# Patient Record
Sex: MALE | Race: WHITE | NOT HISPANIC OR LATINO | Employment: FULL TIME | URBAN - NONMETROPOLITAN AREA
[De-identification: names, ages, dates, MRNs, and addresses within clinical notes are randomized per-mention and may not be internally consistent; named-entity substitution may affect disease eponyms.]

---

## 2018-08-14 ENCOUNTER — OFFICE VISIT (OUTPATIENT)
Dept: INTERNAL MEDICINE | Facility: CLINIC | Age: 53
End: 2018-08-14

## 2018-08-14 VITALS
WEIGHT: 215 LBS | TEMPERATURE: 98.1 F | BODY MASS INDEX: 30.1 KG/M2 | HEART RATE: 90 BPM | SYSTOLIC BLOOD PRESSURE: 140 MMHG | OXYGEN SATURATION: 96 % | HEIGHT: 71 IN | DIASTOLIC BLOOD PRESSURE: 80 MMHG

## 2018-08-14 DIAGNOSIS — E66.9 CLASS 1 OBESITY WITHOUT SERIOUS COMORBIDITY WITH BODY MASS INDEX (BMI) OF 30.0 TO 30.9 IN ADULT, UNSPECIFIED OBESITY TYPE: Primary | ICD-10-CM

## 2018-08-14 DIAGNOSIS — Z00.00 ANNUAL PHYSICAL EXAM: ICD-10-CM

## 2018-08-14 DIAGNOSIS — K21.9 GASTROESOPHAGEAL REFLUX DISEASE WITHOUT ESOPHAGITIS: ICD-10-CM

## 2018-08-14 DIAGNOSIS — M19.90 ARTHRITIS: ICD-10-CM

## 2018-08-14 DIAGNOSIS — Z72.0 TOBACCO ABUSE: ICD-10-CM

## 2018-08-14 DIAGNOSIS — R05.9 COUGH: ICD-10-CM

## 2018-08-14 PROBLEM — E66.811 CLASS 1 OBESITY WITHOUT SERIOUS COMORBIDITY WITH BODY MASS INDEX (BMI) OF 30.0 TO 30.9 IN ADULT: Status: ACTIVE | Noted: 2018-08-14

## 2018-08-14 PROCEDURE — 99386 PREV VISIT NEW AGE 40-64: CPT | Performed by: INTERNAL MEDICINE

## 2018-08-14 NOTE — PROGRESS NOTES
Mariano Newberry is a 52 y.o. male and is here for a comprehensive physical exam.     Do you take any herbs or supplements that were not prescribed by a doctor? no  Are you taking calcium supplements? no  Are you taking aspirin daily? no      The following portions of the patient's history were reviewed and updated as appropriate: allergies, current medications, past family history, past medical history, past social history, past surgical history and problem list.      Review of Systems   Constitutional: Negative.    HENT: Negative.    Eyes: Negative.    Respiratory: Negative.    Cardiovascular: Negative.    Gastrointestinal: Negative.    Endocrine: Negative.    Genitourinary: Negative.    Musculoskeletal: Negative.    Skin: Negative.    Allergic/Immunologic: Negative.    Neurological: Negative.    Hematological: Negative.    Psychiatric/Behavioral: Negative.    All other systems reviewed and are negative.        Physical Exam   Constitutional: He is oriented to person, place, and time. He appears well-developed and well-nourished.   HENT:   Head: Normocephalic and atraumatic.   Right Ear: External ear normal.   Left Ear: External ear normal.   Nose: Nose normal.   Mouth/Throat: Oropharynx is clear and moist.   Eyes: Pupils are equal, round, and reactive to light. Conjunctivae and EOM are normal.   Neck: Normal range of motion. Neck supple. No thyromegaly present.   Cardiovascular: Normal rate, regular rhythm, normal heart sounds and intact distal pulses.    Pulmonary/Chest: Effort normal and breath sounds normal.   Abdominal: Soft. Bowel sounds are normal.   Genitourinary: Rectum normal, prostate normal and penis normal.   Musculoskeletal: Normal range of motion.   Neurological: He is alert and oriented to person, place, and time. He has normal reflexes.   Skin: Skin is warm and dry.   Psychiatric: He has a normal mood and affect. His behavior is normal. Judgment and thought content normal.   Nursing  note and vitals reviewed.      All  tests have been reviewed.    Assessment/Plan          1. Patient Counseling:  --Nutrition: Stressed importance of moderation in sodium/caffeine intake, saturated fat and cholesterol, caloric balance, sufficient intake of fresh fruits, vegetables, fiber, calcium and iron.  --Exercise: Stressed the importance of regular exercise.   --Injury prevention: Discussed safety belts, safety helmets, smoke detector, smoking near bedding or upholstery.   --Dental health: Discussed importance of regular tooth brushing, flossing, and dental visits.  --Immunizations reviewed.  --Discussed benefits of screening colonoscopy.  --After hours service discussed with patient    2. Discussed the patient's BMI with him.           cough chronic do CXR  GERD improved after good diet patient is not taking medication  Right thumb pain due to repetitive work.  Watch for now   Family history of diabetes  Possible kidney cancer in the family.    Patient uses tobacco.    Patient also has chronic a cough do CXR  loss of hearing  Over weight  Diet   Colon ca screen  Borderline, BP, watch for now  Do labs    1 mo after labs  Easy bruise mild.

## 2018-08-15 LAB
ALBUMIN SERPL-MCNC: 4.3 G/DL (ref 3.5–5)
ALBUMIN/GLOB SERPL: 1.5 G/DL (ref 1–2)
ALP SERPL-CCNC: 78 U/L (ref 38–126)
ALT SERPL-CCNC: 33 U/L (ref 13–69)
AST SERPL-CCNC: 30 U/L (ref 15–46)
BASOPHILS # BLD AUTO: 0.03 10*3/MM3 (ref 0–0.2)
BASOPHILS NFR BLD AUTO: 0.5 % (ref 0–2.5)
BILIRUB SERPL-MCNC: 0.7 MG/DL (ref 0.2–1.3)
BUN SERPL-MCNC: 18 MG/DL (ref 7–20)
BUN/CREAT SERPL: 16.4 (ref 6.3–21.9)
CALCIUM SERPL-MCNC: 10 MG/DL (ref 8.4–10.2)
CHLORIDE SERPL-SCNC: 104 MMOL/L (ref 98–107)
CHOLEST SERPL-MCNC: 190 MG/DL (ref 0–199)
CO2 SERPL-SCNC: 26 MMOL/L (ref 26–30)
CREAT SERPL-MCNC: 1.1 MG/DL (ref 0.6–1.3)
EOSINOPHIL # BLD AUTO: 0.05 10*3/MM3 (ref 0–0.7)
EOSINOPHIL NFR BLD AUTO: 0.8 % (ref 0–7)
ERYTHROCYTE [DISTWIDTH] IN BLOOD BY AUTOMATED COUNT: 13.8 % (ref 11.5–14.5)
GLOBULIN SER CALC-MCNC: 2.9 GM/DL
GLUCOSE SERPL-MCNC: 96 MG/DL (ref 74–98)
HCT VFR BLD AUTO: 45.7 % (ref 42–52)
HDLC SERPL-MCNC: 53 MG/DL (ref 40–60)
HGB BLD-MCNC: 15.4 G/DL (ref 14–18)
IMM GRANULOCYTES # BLD: 0.01 10*3/MM3 (ref 0–0.06)
IMM GRANULOCYTES NFR BLD: 0.2 % (ref 0–0.6)
LDLC SERPL CALC-MCNC: 116 MG/DL (ref 0–99)
LYMPHOCYTES # BLD AUTO: 1.52 10*3/MM3 (ref 0.6–3.4)
LYMPHOCYTES NFR BLD AUTO: 24.2 % (ref 10–50)
MCH RBC QN AUTO: 29.4 PG (ref 27–31)
MCHC RBC AUTO-ENTMCNC: 33.7 G/DL (ref 30–37)
MCV RBC AUTO: 87.2 FL (ref 80–94)
MONOCYTES # BLD AUTO: 0.61 10*3/MM3 (ref 0–0.9)
MONOCYTES NFR BLD AUTO: 9.7 % (ref 0–12)
NEUTROPHILS # BLD AUTO: 4.05 10*3/MM3 (ref 2–6.9)
NEUTROPHILS NFR BLD AUTO: 64.6 % (ref 37–80)
NRBC BLD AUTO-RTO: 0 /100 WBC (ref 0–0)
PLATELET # BLD AUTO: 303 10*3/MM3 (ref 130–400)
POTASSIUM SERPL-SCNC: 4.3 MMOL/L (ref 3.5–5.1)
PROT SERPL-MCNC: 7.2 G/DL (ref 6.3–8.2)
PSA SERPL-MCNC: 0.7 NG/ML (ref 0.06–4)
RBC # BLD AUTO: 5.24 10*6/MM3 (ref 4.7–6.1)
SODIUM SERPL-SCNC: 142 MMOL/L (ref 137–145)
TRIGL SERPL-MCNC: 105 MG/DL
TSH SERPL DL<=0.005 MIU/L-ACNC: 1.75 MIU/ML (ref 0.47–4.68)
VLDLC SERPL CALC-MCNC: 21 MG/DL
WBC # BLD AUTO: 6.27 10*3/MM3 (ref 4.8–10.8)

## 2018-08-22 LAB
APPEARANCE UR: CLEAR
BILIRUB UR QL STRIP: NEGATIVE
COLOR UR: YELLOW
GLUCOSE UR QL: NEGATIVE
HEMOCCULT STL QL IA: NEGATIVE
HGB UR QL STRIP: NEGATIVE
KETONES UR QL STRIP: NEGATIVE
LEUKOCYTE ESTERASE UR QL STRIP: NEGATIVE
NITRITE UR QL STRIP: NEGATIVE
PH UR STRIP: 6 [PH] (ref 5–8)
PROT UR QL STRIP: NEGATIVE
SP GR UR: 1.01 (ref 1–1.03)
UROBILINOGEN UR STRIP-MCNC: NORMAL MG/DL

## 2018-09-14 ENCOUNTER — HOSPITAL ENCOUNTER (OUTPATIENT)
Dept: GENERAL RADIOLOGY | Facility: HOSPITAL | Age: 53
Discharge: HOME | End: 2018-09-14
Attending: INTERNAL MEDICINE | Admitting: INTERNAL MEDICINE

## 2018-09-14 ENCOUNTER — OFFICE VISIT (OUTPATIENT)
Dept: INTERNAL MEDICINE | Facility: CLINIC | Age: 53
End: 2018-09-14

## 2018-09-14 VITALS
HEIGHT: 71 IN | OXYGEN SATURATION: 98 % | SYSTOLIC BLOOD PRESSURE: 148 MMHG | DIASTOLIC BLOOD PRESSURE: 84 MMHG | HEART RATE: 84 BPM

## 2018-09-14 DIAGNOSIS — K21.9 GASTROESOPHAGEAL REFLUX DISEASE WITHOUT ESOPHAGITIS: ICD-10-CM

## 2018-09-14 DIAGNOSIS — E66.9 CLASS 1 OBESITY WITHOUT SERIOUS COMORBIDITY WITH BODY MASS INDEX (BMI) OF 30.0 TO 30.9 IN ADULT, UNSPECIFIED OBESITY TYPE: ICD-10-CM

## 2018-09-14 DIAGNOSIS — R05.9 COUGH: ICD-10-CM

## 2018-09-14 DIAGNOSIS — R05.9 COUGH: Primary | ICD-10-CM

## 2018-09-14 DIAGNOSIS — Z72.0 TOBACCO ABUSE: ICD-10-CM

## 2018-09-14 DIAGNOSIS — M19.90 ARTHRITIS: ICD-10-CM

## 2018-09-14 PROBLEM — Z00.00 ANNUAL PHYSICAL EXAM: Status: RESOLVED | Noted: 2018-08-14 | Resolved: 2018-09-14

## 2018-09-14 PROCEDURE — 99213 OFFICE O/P EST LOW 20 MIN: CPT | Performed by: INTERNAL MEDICINE

## 2018-09-14 PROCEDURE — 71046 X-RAY EXAM CHEST 2 VIEWS: CPT

## 2018-09-14 NOTE — PROGRESS NOTES
Subjective   Tobias Newberry is a 53 y.o. male.     Chief Complaint   Patient presents with   • Follow-up       History of Present Illness   Patient here for follow-up of.  The GERD stable blood pressure slightly elevated.    Cholesterol mildly elevated.  Patient still smokes weight is high.  Pending colonoscopy  No current outpatient prescriptions on file.    The following portions of the patient's history were reviewed and updated as appropriate: allergies, current medications, past family history, past medical history, past social history, past surgical history and problem list.    Review of Systems   Constitutional: Negative.    Respiratory: Negative.    Cardiovascular: Negative.    Gastrointestinal: Negative.    Skin: Negative.    Psychiatric/Behavioral: Negative.        Objective   Physical Exam   Constitutional: He is oriented to person, place, and time. He appears well-developed and well-nourished.   Neck: Neck supple.   Cardiovascular: Normal rate, regular rhythm and normal heart sounds.    Pulmonary/Chest: Effort normal and breath sounds normal.   Abdominal: Soft. Bowel sounds are normal.   Neurological: He is alert and oriented to person, place, and time.   Psychiatric: He has a normal mood and affect. His behavior is normal.       All tests have been reviewed.    Assessment/Plan   There are no diagnoses linked to this encounter.            GERD improved after good diet patient is not taking medication  Right thumb pain due to repetitive work.  Watch for now   Family history of diabetes  Possible kidney cancer in the family.    Patient uses tobacco.    Patient also has chronic a cough do CXR  loss of hearing  Over weight  Diet   Colon ca screen scope pending  Borderline, BP, watch for now, need to loose weight and low salt  Hyperlipidemia, diet for now  Easy bruise mild.  6 mo

## 2021-08-05 ENCOUNTER — TELEPHONE (OUTPATIENT)
Dept: INTERNAL MEDICINE | Facility: CLINIC | Age: 56
End: 2021-08-05

## 2021-08-05 NOTE — TELEPHONE ENCOUNTER
Caller: Tobias Newberry    Relationship: Self    Best call back number: [phone]    What orders are you requesting (i.e. lab or imaging): TEST FOR COVID ANTIBIOTIES    In what timeframe would the patient need to come in: ASAP    Where will you receive your lab/imaging services: [facility]    Additional notes:

## 2021-08-05 NOTE — TELEPHONE ENCOUNTER
Pt informed via vm he is overdue for an annual physical, labs can be ordered at that visit and the testing done for antibodies, but we cannot order antibody testing, that it is done downstairs ordered by Dr. Caicedo and that we cannot even give him results of the antibody test, he would have to sign up with [facility] for those results

## 2022-01-10 ENCOUNTER — OFFICE VISIT (OUTPATIENT)
Dept: INTERNAL MEDICINE | Facility: CLINIC | Age: 57
End: 2022-01-10

## 2022-01-10 VITALS
HEART RATE: 88 BPM | SYSTOLIC BLOOD PRESSURE: 160 MMHG | OXYGEN SATURATION: 98 % | DIASTOLIC BLOOD PRESSURE: 90 MMHG | BODY MASS INDEX: 30.66 KG/M2 | WEIGHT: 219 LBS | TEMPERATURE: 98 F | HEIGHT: 71 IN

## 2022-01-10 DIAGNOSIS — R31.9 HEMATURIA, UNSPECIFIED TYPE: ICD-10-CM

## 2022-01-10 DIAGNOSIS — N30.01 ACUTE CYSTITIS WITH HEMATURIA: Primary | ICD-10-CM

## 2022-01-10 LAB
BILIRUB BLD-MCNC: NEGATIVE MG/DL
CLARITY, POC: CLEAR
COLOR UR: YELLOW
EXPIRATION DATE: ABNORMAL
GLUCOSE UR STRIP-MCNC: NEGATIVE MG/DL
KETONES UR QL: NEGATIVE
LEUKOCYTE EST, POC: NEGATIVE
Lab: ABNORMAL
NITRITE UR-MCNC: NEGATIVE MG/ML
PH UR: 6 [PH] (ref 5–8)
PROT UR STRIP-MCNC: ABNORMAL MG/DL
RBC # UR STRIP: ABNORMAL /UL
SP GR UR: 1.02 (ref 1–1.03)
UROBILINOGEN UR QL: NORMAL

## 2022-01-10 PROCEDURE — 81003 URINALYSIS AUTO W/O SCOPE: CPT | Performed by: FAMILY MEDICINE

## 2022-01-10 PROCEDURE — 99203 OFFICE O/P NEW LOW 30 MIN: CPT | Performed by: FAMILY MEDICINE

## 2022-01-10 RX ORDER — CIPROFLOXACIN 500 MG/1
500 TABLET, FILM COATED ORAL 2 TIMES DAILY
Qty: 14 TABLET | Refills: 0 | OUTPATIENT
Start: 2022-01-10 | End: 2022-01-16

## 2022-01-10 NOTE — PROGRESS NOTES
"Tobias Newberry is a 56 y.o. male.    Chief Complaint   Patient presents with   • Urinary Tract Infection     blood in urine       HPI   Patient reports urinary problems for 3 day(s).  They admit to bloody urine.  He has passes a blood clot yesterday. They deny urgency, frequency and lower abdominal pain.  He started cipro 2 days ago.  Denies any hematuria since yesterday morning.      The following portions of the patient's history were reviewed and updated as appropriate: allergies, current medications, past family history, past medical history, past social history, past surgical history and problem list.     No Known Allergies      Current Outpatient Medications:   •  ciprofloxacin (CIPRO) 500 MG tablet, Take 1 tablet by mouth 2 (Two) Times a Day for 7 days., Disp: 14 tablet, Rfl: 0    ROS    Review of Systems   Constitutional: Negative for chills and fever.   Respiratory: Negative for cough and shortness of breath.    Gastrointestinal: Negative for abdominal pain.   Genitourinary: Positive for hematuria. Negative for frequency.       Vitals:    01/10/22 1634   BP: 160/90   Pulse: 88   Temp: 98 °F (36.7 °C)   SpO2: 98%   Weight: 99.3 kg (219 lb)   Height: 180.3 cm (71\")     Body mass index is 30.54 kg/m².    Physical Exam     Physical Exam  Constitutional:       General: He is not in acute distress.     Appearance: Normal appearance. He is well-developed.   HENT:      Head: Normocephalic and atraumatic.      Right Ear: External ear normal.      Left Ear: External ear normal.   Eyes:      Extraocular Movements: Extraocular movements intact.      Conjunctiva/sclera: Conjunctivae normal.   Cardiovascular:      Rate and Rhythm: Normal rate and regular rhythm.      Heart sounds: No murmur heard.      Pulmonary:      Effort: Pulmonary effort is normal. No respiratory distress.      Breath sounds: Normal breath sounds. No wheezing.   Abdominal:      General: Bowel sounds are normal. There is no distension.      " Palpations: Abdomen is soft.      Tenderness: There is no abdominal tenderness. There is no right CVA tenderness or left CVA tenderness.   Skin:     General: Skin is warm and dry.   Neurological:      Mental Status: He is alert and oriented to person, place, and time.      Cranial Nerves: No cranial nerve deficit.   Psychiatric:         Mood and Affect: Mood normal.         Behavior: Behavior normal.         Assessment/Plan    Problems Addressed this Visit     None      Visit Diagnoses     Acute cystitis with hematuria    -  Primary    Relevant Medications    ciprofloxacin (CIPRO) 500 MG tablet    Other Relevant Orders    POCT urinalysis dipstick, automated (Completed)    Hematuria, unspecified type        Relevant Orders    XR Abdomen KUB        Patient advised to complete full 14 days of cipro.  Will obtain KUB to rule out stone.  Patient will need to follow up with PCP.      New Medications Ordered This Visit   Medications   • ciprofloxacin (CIPRO) 500 MG tablet     Sig: Take 1 tablet by mouth 2 (Two) Times a Day for 7 days.     Dispense:  14 tablet     Refill:  0       No orders of the defined types were placed in this encounter.      Return in about 1 week for hematuria with Dr. Rachael Contreras DO

## 2022-01-11 ENCOUNTER — HOSPITAL ENCOUNTER (OUTPATIENT)
Dept: GENERAL RADIOLOGY | Facility: HOSPITAL | Age: 57
Discharge: HOME | End: 2022-01-11
Admitting: FAMILY MEDICINE

## 2022-01-11 PROCEDURE — 74018 RADEX ABDOMEN 1 VIEW: CPT

## 2022-01-16 ENCOUNTER — APPOINTMENT (OUTPATIENT)
Dept: CT IMAGING | Facility: HOSPITAL | Age: 57
End: 2022-01-16

## 2022-01-16 ENCOUNTER — HOSPITAL ENCOUNTER (EMERGENCY)
Facility: HOSPITAL | Age: 57
Discharge: HOME | End: 2022-01-16
Attending: EMERGENCY MEDICINE | Admitting: FAMILY MEDICINE

## 2022-01-16 VITALS
HEIGHT: 71 IN | OXYGEN SATURATION: 100 % | HEART RATE: 86 BPM | DIASTOLIC BLOOD PRESSURE: 87 MMHG | BODY MASS INDEX: 31.69 KG/M2 | WEIGHT: 226.4 LBS | TEMPERATURE: 98.6 F | RESPIRATION RATE: 18 BRPM | SYSTOLIC BLOOD PRESSURE: 138 MMHG

## 2022-01-16 DIAGNOSIS — N30.01 ACUTE CYSTITIS WITH HEMATURIA: Primary | ICD-10-CM

## 2022-01-16 DIAGNOSIS — C64.2 RENAL CELL CARCINOMA OF LEFT KIDNEY: ICD-10-CM

## 2022-01-16 LAB
ALBUMIN SERPL-MCNC: 4.3 G/DL (ref 3.5–5.2)
ALBUMIN/GLOB SERPL: 1.4 G/DL
ALP SERPL-CCNC: 79 U/L (ref 39–117)
ALT SERPL W P-5'-P-CCNC: 18 U/L (ref 1–41)
ANION GAP SERPL CALCULATED.3IONS-SCNC: 13.8 MMOL/L (ref 5–15)
AST SERPL-CCNC: 18 U/L (ref 1–40)
BACTERIA UR QL AUTO: ABNORMAL /HPF
BASOPHILS # BLD AUTO: 0.06 10*3/MM3 (ref 0–0.2)
BASOPHILS NFR BLD AUTO: 0.7 % (ref 0–1.5)
BILIRUB SERPL-MCNC: 0.3 MG/DL (ref 0–1.2)
BILIRUB UR QL STRIP: ABNORMAL
BUN SERPL-MCNC: 13 MG/DL (ref 6–20)
BUN/CREAT SERPL: 11.5 (ref 7–25)
CALCIUM SPEC-SCNC: 9.4 MG/DL (ref 8.6–10.5)
CHLORIDE SERPL-SCNC: 96 MMOL/L (ref 98–107)
CLARITY UR: ABNORMAL
CO2 SERPL-SCNC: 23.2 MMOL/L (ref 22–29)
COLOR UR: ABNORMAL
CREAT SERPL-MCNC: 1.13 MG/DL (ref 0.76–1.27)
DEPRECATED RDW RBC AUTO: 42 FL (ref 37–54)
EOSINOPHIL # BLD AUTO: 0.17 10*3/MM3 (ref 0–0.4)
EOSINOPHIL NFR BLD AUTO: 2.1 % (ref 0.3–6.2)
ERYTHROCYTE [DISTWIDTH] IN BLOOD BY AUTOMATED COUNT: 13.2 % (ref 12.3–15.4)
GFR SERPL CREATININE-BSD FRML MDRD: 67 ML/MIN/1.73
GLOBULIN UR ELPH-MCNC: 3 GM/DL
GLUCOSE SERPL-MCNC: 101 MG/DL (ref 65–99)
GLUCOSE UR STRIP-MCNC: NEGATIVE MG/DL
HCT VFR BLD AUTO: 39.4 % (ref 37.5–51)
HGB BLD-MCNC: 13.8 G/DL (ref 13–17.7)
HGB UR QL STRIP.AUTO: ABNORMAL
HYALINE CASTS UR QL AUTO: ABNORMAL /LPF
IMM GRANULOCYTES # BLD AUTO: 0.04 10*3/MM3 (ref 0–0.05)
IMM GRANULOCYTES NFR BLD AUTO: 0.5 % (ref 0–0.5)
KETONES UR QL STRIP: NEGATIVE
LEUKOCYTE ESTERASE UR QL STRIP.AUTO: ABNORMAL
LYMPHOCYTES # BLD AUTO: 2.31 10*3/MM3 (ref 0.7–3.1)
LYMPHOCYTES NFR BLD AUTO: 28.5 % (ref 19.6–45.3)
MCH RBC QN AUTO: 30.5 PG (ref 26.6–33)
MCHC RBC AUTO-ENTMCNC: 35 G/DL (ref 31.5–35.7)
MCV RBC AUTO: 87 FL (ref 79–97)
MONOCYTES # BLD AUTO: 0.72 10*3/MM3 (ref 0.1–0.9)
MONOCYTES NFR BLD AUTO: 8.9 % (ref 5–12)
NEUTROPHILS NFR BLD AUTO: 4.8 10*3/MM3 (ref 1.7–7)
NEUTROPHILS NFR BLD AUTO: 59.3 % (ref 42.7–76)
NITRITE UR QL STRIP: POSITIVE
NRBC BLD AUTO-RTO: 0 /100 WBC (ref 0–0.2)
PH UR STRIP.AUTO: <=5 [PH] (ref 5–8)
PLATELET # BLD AUTO: 261 10*3/MM3 (ref 140–450)
PMV BLD AUTO: 8.8 FL (ref 6–12)
POTASSIUM SERPL-SCNC: 3.9 MMOL/L (ref 3.5–5.2)
PROT SERPL-MCNC: 7.3 G/DL (ref 6–8.5)
PROT UR QL STRIP: ABNORMAL
RBC # BLD AUTO: 4.53 10*6/MM3 (ref 4.14–5.8)
RBC # UR STRIP: ABNORMAL /HPF
REF LAB TEST METHOD: ABNORMAL
SODIUM SERPL-SCNC: 133 MMOL/L (ref 136–145)
SP GR UR STRIP: 1.01 (ref 1–1.03)
SQUAMOUS #/AREA URNS HPF: ABNORMAL /HPF
UROBILINOGEN UR QL STRIP: ABNORMAL
WBC # UR STRIP: ABNORMAL /HPF
WBC NRBC COR # BLD: 8.1 10*3/MM3 (ref 3.4–10.8)

## 2022-01-16 PROCEDURE — 81001 URINALYSIS AUTO W/SCOPE: CPT | Performed by: PHYSICIAN ASSISTANT

## 2022-01-16 PROCEDURE — 74178 CT ABD&PLV WO CNTR FLWD CNTR: CPT

## 2022-01-16 PROCEDURE — 99283 EMERGENCY DEPT VISIT LOW MDM: CPT

## 2022-01-16 PROCEDURE — 85025 COMPLETE CBC W/AUTO DIFF WBC: CPT | Performed by: PHYSICIAN ASSISTANT

## 2022-01-16 PROCEDURE — 74176 CT ABD & PELVIS W/O CONTRAST: CPT

## 2022-01-16 PROCEDURE — 51702 INSERT TEMP BLADDER CATH: CPT

## 2022-01-16 PROCEDURE — 80053 COMPREHEN METABOLIC PANEL: CPT | Performed by: PHYSICIAN ASSISTANT

## 2022-01-16 PROCEDURE — 51798 US URINE CAPACITY MEASURE: CPT

## 2022-01-16 PROCEDURE — 25010000002 IOPAMIDOL 61 % SOLUTION: Performed by: FAMILY MEDICINE

## 2022-01-16 RX ORDER — SODIUM CHLORIDE 0.9 % (FLUSH) 0.9 %
10 SYRINGE (ML) INJECTION AS NEEDED
Status: DISCONTINUED | OUTPATIENT
Start: 2022-01-16 | End: 2022-01-17 | Stop reason: HOSPADM

## 2022-01-16 RX ORDER — CEFDINIR 300 MG/1
300 CAPSULE ORAL 2 TIMES DAILY
Qty: 14 CAPSULE | Refills: 0 | Status: SHIPPED | OUTPATIENT
Start: 2022-01-16 | End: 2022-01-23

## 2022-01-16 RX ADMIN — IOPAMIDOL 100 ML: 612 INJECTION, SOLUTION INTRAVENOUS at 21:15

## 2022-01-16 NOTE — ED PROVIDER NOTES
Subjective   Chief Complaint: Hematuria, difficulty urinating  History of Present Illness: 56-year-old male comes in for evaluation above complaint.  For 1 week he has had intermittent hematuria.  He started passing clots and for the past 2 hours has been unable to urinate although he feels like he needs to.  No dysuria.  He has seen urgent care and his PCP and had a KUB and a urinalysis done and has been on over a week of Cipro with progression of symptoms. Blood in urine was present Sunday morning with clots, has been clear until yesterday had hematuria with clots again. Had nitrite positive urine on 1/8 and on 1/10 nitrite had resolved.  He stopped smoking cigarettes about 5 years ago.  No back or flank pain.  No history of kidney stones  Onset: 1 week ago  Timing: Ongoing  Exacerbating / Alleviating factors: None  Associated symptoms: None      Nurses Notes reviewed and agree, including vitals, allergies, social history and prior medical history.          Review of Systems   Constitutional: Negative.    HENT: Negative.    Eyes: Negative.    Respiratory: Negative.    Cardiovascular: Negative.    Gastrointestinal: Negative.  Negative for abdominal pain.   Genitourinary: Positive for difficulty urinating and hematuria. Negative for dysuria and flank pain.   Musculoskeletal: Negative.  Negative for back pain.   Skin: Negative.    Allergic/Immunologic: Negative.    Neurological: Negative.    Psychiatric/Behavioral: Negative.    All other systems reviewed and are negative.      History reviewed. No pertinent past medical history.    No Known Allergies    History reviewed. No pertinent surgical history.    Family History   Problem Relation Age of Onset   • Cancer Mother    • Heart disease Father    • Cancer Brother    • Leukemia Maternal Aunt    • Heart disease Paternal Uncle    • Skin cancer Maternal Grandfather        Social History     Socioeconomic History   • Marital status:    Tobacco Use   • Smoking  status: Former Smoker     Packs/day: 1.50     Years: 4.00     Pack years: 6.00     Quit date: 1/10/2016     Years since quittin.0   • Smokeless tobacco: Current User     Types: Chew   Vaping Use   • Vaping Use: Every day   • Substances: Flavoring   Substance and Sexual Activity   • Alcohol use: Yes     Comment: SOCIAL - WEEKENDS    • Drug use: No   • Sexual activity: Yes     Partners: Female           Objective   Physical Exam  Vitals and nursing note reviewed.   Constitutional:       General: He is not in acute distress.     Appearance: Normal appearance. He is normal weight. He is not ill-appearing, toxic-appearing or diaphoretic.   HENT:      Head: Normocephalic and atraumatic.   Eyes:      Extraocular Movements: Extraocular movements intact.   Cardiovascular:      Rate and Rhythm: Normal rate.   Pulmonary:      Effort: Pulmonary effort is normal.   Abdominal:      General: Abdomen is flat.      Palpations: Abdomen is soft.      Tenderness: There is no abdominal tenderness. There is no guarding or rebound.   Musculoskeletal:         General: Normal range of motion.      Cervical back: Normal range of motion.   Skin:     General: Skin is warm and dry.   Neurological:      General: No focal deficit present.      Mental Status: He is alert.   Psychiatric:         Mood and Affect: Mood normal.         Behavior: Behavior normal.         Procedures           ED Course  ED Course as of 22 0811   Sun  Hemoglobin: 13.8 [TM]    Hematocrit: 39.4 [TM]    WBC, UA(!): 6-12 [TM]   1919 Bacteria, UA(!): 2+ [TM]    Nitrite, UA(!): Positive [TM]   191 BUN: 13 [TM]   191 Creatinine: 1.13 [TM]      ED Course User Index  [TM] Mandeep Hastings PA-C                                                 [facility]  191  Patient has had 1500 cc of grossly bloody urine out feels much better.  Final diagnoses:   Acute cystitis with hematuria   Renal cell carcinoma of left kidney (HCC)       ED  Disposition  ED Disposition     ED Disposition Condition Comment    Discharge Stable           Nabeel Bullock MD  [address] [facility]  [address]  [facility] [id number] [phone]    On 1/18/2022      Manjinder Obrien MD  [address] [facility] [address]  [facility] [address]  [phone]    Schedule an appointment as soon as possible for a visit in 1 day  new diagnosis of renal cell carcinoma; hematuria         Medication List      New Prescriptions    cefdinir 300 MG capsule  Commonly known as: OMNICEF  Take 1 capsule by mouth 2 (Two) Times a Day for 7 days.        Stop    ciprofloxacin 500 MG tablet  Commonly known as: CIPRO           Where to Get Your Medications      These medications were sent to [id number] - [place], [place] - [address] - [phone]  - [phone] FX  [address] [facility] [address]    Phone: [phone]   · cefdinir 300 MG capsule          Mandeep Hastings PA-C  01/17/22 0811

## 2022-01-17 NOTE — DISCHARGE INSTRUCTIONS
Please stop the Cipro that you were started on and begin the Omnicef that we have called into the pharmacy for you.

## 2022-01-18 ENCOUNTER — TELEPHONE (OUTPATIENT)
Dept: UROLOGY | Facility: CLINIC | Age: 57
End: 2022-01-18

## 2022-01-18 ENCOUNTER — OFFICE VISIT (OUTPATIENT)
Dept: INTERNAL MEDICINE | Facility: CLINIC | Age: 57
End: 2022-01-18

## 2022-01-18 VITALS
SYSTOLIC BLOOD PRESSURE: 160 MMHG | HEART RATE: 105 BPM | WEIGHT: 215 LBS | BODY MASS INDEX: 30.1 KG/M2 | OXYGEN SATURATION: 99 % | HEIGHT: 71 IN | TEMPERATURE: 97.3 F | DIASTOLIC BLOOD PRESSURE: 102 MMHG

## 2022-01-18 DIAGNOSIS — C64.2 CARCINOMA OF LEFT KIDNEY: ICD-10-CM

## 2022-01-18 DIAGNOSIS — I10 PRIMARY HYPERTENSION: Primary | ICD-10-CM

## 2022-01-18 DIAGNOSIS — R31.9 HEMATURIA, UNSPECIFIED TYPE: ICD-10-CM

## 2022-01-18 PROBLEM — E66.811 CLASS 1 OBESITY WITHOUT SERIOUS COMORBIDITY WITH BODY MASS INDEX (BMI) OF 30.0 TO 30.9 IN ADULT: Status: RESOLVED | Noted: 2018-08-14 | Resolved: 2022-01-18

## 2022-01-18 PROBLEM — E66.9 CLASS 1 OBESITY WITHOUT SERIOUS COMORBIDITY WITH BODY MASS INDEX (BMI) OF 30.0 TO 30.9 IN ADULT: Status: RESOLVED | Noted: 2018-08-14 | Resolved: 2022-01-18

## 2022-01-18 PROCEDURE — 99214 OFFICE O/P EST MOD 30 MIN: CPT | Performed by: INTERNAL MEDICINE

## 2022-01-18 RX ORDER — AMLODIPINE BESYLATE 2.5 MG/1
2.5 TABLET ORAL DAILY
Qty: 30 TABLET | Refills: 1 | Status: SHIPPED | OUTPATIENT
Start: 2022-01-18 | End: 2022-03-21

## 2022-01-18 NOTE — TELEPHONE ENCOUNTER
Caller: Tobias Newberry    Relationship: Self    Best call back number: [phone]    What was the call regarding: PATIENT WAS REFERRED FROM THE ER FOR Acute cystitis with hematuria & Renal cell carcinoma of left kidney (HCC). CALLING TO SCHEDULE AN APPT, REFERRAL WAS SENT TO SCHEDULE REVIEW.     UNABLE TO WARM TRANSFER     Do you require a callback: YES

## 2022-01-18 NOTE — PROGRESS NOTES
Subjective   Tobias Newberry is a 56 y.o. male.     Chief Complaint   Patient presents with   • Hospital Follow Up Visit   • Blood in Urine     recent diagnosis of renal cell carcinoma; currently using catheter   • Hypertension       History of Present Illness   Patient here for follow-up hospital visit.  Patient went to emergency room due to hematuria CT scan showed renal cell carcinoma.  Patient has Ross catheter in place now.  Patient also has a high blood pressure now.  Denies any fever chills denies any back pain    Current Outpatient Medications:   •  cefdinir (OMNICEF) 300 MG capsule, Take 1 capsule by mouth 2 (Two) Times a Day for 7 days., Disp: 14 capsule, Rfl: 0  •  amLODIPine (NORVASC) 2.5 MG tablet, Take 1 tablet by mouth Daily., Disp: 30 tablet, Rfl: 1    The following portions of the patient's history were reviewed and updated as appropriate: allergies, current medications, past family history, past medical history, past social history, past surgical history and problem list.    Review of Systems   Constitutional: Negative.    Respiratory: Negative.    Cardiovascular: Negative.    Gastrointestinal: Negative.    Genitourinary: Positive for hematuria.   Musculoskeletal: Negative.    Skin: Negative.    Neurological: Negative.    Psychiatric/Behavioral: Negative.        Objective   Physical Exam  Genitourinary:     Comments: Ross catheter in place  Musculoskeletal:         General: No tenderness.         All tests have been reviewed.    Assessment/Plan   Diagnoses and all orders for this visit:    Primary hypertension start med  -     amLODIPine (NORVASC) 2.5 MG tablet; Take 1 tablet by mouth Daily.  -     Ambulatory Referral to Urology    Carcinoma of left kidney (HCC) stat referral  -     Ambulatory Referral to Urology    Hematuria, unspecified type  -     Ambulatory Referral to Urology

## 2022-01-20 ENCOUNTER — HOSPITAL ENCOUNTER (OUTPATIENT)
Dept: CT IMAGING | Facility: HOSPITAL | Age: 57
Discharge: HOME | End: 2022-01-20
Admitting: UROLOGY

## 2022-01-20 DIAGNOSIS — N28.89 RENAL MASS: ICD-10-CM

## 2022-01-20 PROCEDURE — 25010000002 IOPAMIDOL 61 % SOLUTION: Performed by: UROLOGY

## 2022-01-20 PROCEDURE — 71270 CT THORAX DX C-/C+: CPT

## 2022-01-20 RX ADMIN — IOPAMIDOL 100 ML: 612 INJECTION, SOLUTION INTRAVENOUS at 12:14

## 2022-01-24 ENCOUNTER — OFFICE VISIT (OUTPATIENT)
Dept: UROLOGY | Facility: CLINIC | Age: 57
End: 2022-01-24

## 2022-01-24 DIAGNOSIS — R91.1 LUNG NODULE: ICD-10-CM

## 2022-01-24 DIAGNOSIS — N28.89 RENAL MASS: Primary | ICD-10-CM

## 2022-01-24 PROCEDURE — 52000 CYSTOURETHROSCOPY: CPT | Performed by: UROLOGY

## 2022-01-24 RX ORDER — SODIUM CHLORIDE 9 MG/ML
125 INJECTION, SOLUTION INTRAVENOUS CONTINUOUS
Status: CANCELLED | OUTPATIENT
Start: 2022-01-24

## 2022-01-24 NOTE — PROGRESS NOTES
No chief complaint on file.     HPI  Ms. Newberry is a 56 y.o. male with history below in assessment, who presents for follow up.     At this visit he denies any gross hematuria.     No past medical history on file.    No past surgical history on file.      Current Outpatient Medications:   •  amLODIPine (NORVASC) 2.5 MG tablet, Take 1 tablet by mouth Daily., Disp: 30 tablet, Rfl: 1     Physical Exam  There were no vitals taken for this visit.    Labs  Brief Urine Lab Results  (Last result in the past 365 days)      Color   Clarity   Blood   Leuk Est   Nitrite   Protein   CREAT   Urine HCG        01/16/22 1903 Red   Cloudy   Large (3+)   Moderate (2+)   Positive   >=300 mg/dL (3+)                 Lab Results   Component Value Date    GLUCOSE 101 (H) 01/16/2022    CALCIUM 9.4 01/16/2022     (L) 01/16/2022    K 3.9 01/16/2022    CO2 23.2 01/16/2022    CL 96 (L) 01/16/2022    BUN 13 01/16/2022    CREATININE 1.13 01/16/2022    EGFRIFAFRI 85 08/14/2018    EGFRIFNONA 67 01/16/2022    BCR 11.5 01/16/2022    ANIONGAP 13.8 01/16/2022       Lab Results   Component Value Date    WBC 8.10 01/16/2022    HGB 13.8 01/16/2022    HCT 39.4 01/16/2022    MCV 87.0 01/16/2022     01/16/2022       Urine Culture    Urine Culture 1/8/22   Urine Culture Final report              Lab Results   Component Value Date    PSA 0.705 08/14/2018         Radiographic Studies  CT Abdomen Pelvis With & Without Contrast  Result Date: 1/16/2022  Locally advanced left renal cell carcinoma with possible left adrenal metastasis. Authenticated by Luís Montes De Oca M.D. on 01/16/2022 10:31:22 PM    CT Abdomen Pelvis Without Contrast  Result Date: 1/16/2022  Left renal mass worrisome for carcinoma with possible tumor thrombus in the left renal vein.  CT of the abdomen with and without contrast is recommended for further evaluation. Increased left ureteral dilation is likely due to obstructive effects of the blood clot in the urinary bladder. Authenticated  by Luís Montes De Oca M.D. on 01/16/2022 09:13:09 PM    CT Chest With & Without Contrast Diagnostic  Result Date: 1/20/2022  10 mm nonspecific nodule in the right upper lobe. Short interval follow-up is recommended to ensure stability.   This study was performed with techniques to keep radiation doses as low as reasonably achievable (ALARA). Individualized dose reduction techniques using automated exposure control or adjustment of mA and/or kV according to the patient size were employed.  This report was finalized on 1/20/2022 12:22 PM by Bob Parker M.D..    XR Abdomen KUB  Result Date: 1/12/2022   Unremarkable KUB.  This report was finalized on 1/12/2022 12:45 PM by Wero Drake MD.      Preprocedure diagnosis  Gross hematuria    Postprocedure diagnosis  same    Procedure  Flexible Cystourethroscopy    Attending surgeon  Manjinder Obrien MD    Anesthesia  2% lidocaine jelly intraurethrally    Complications  None    Indications  56 y.o. male undergoing a flexible cystoscopy for the above mentioned indications.  Informed consent was obtained.      Findings  Cystoscopic findings included one right and left ureteral orifice in the normal anatomic position with normal bladder mucosa and no tumors, masses or stones. The urethral urothelium was within normal limits with no strictures.  There was not a prominent median lobe.  The lateral lobes were not obstructive in appearance.      Procedure  The patient was placed in supine position and prepped and draped in sterile fashion with lidocaine jelly per urethra for anesthesia.  A timeout was performed.  The 14F flexible cystoscope was lubricated and gently placed through the penile urethra and into the bladder.  The bladder was completely visualized.  The cystoscope was retroflexed and the bladder neck and prostate visualized.  The cystoscope was slowly withdrawn while visualizing the urethra and the procedure terminated.  The patient tolerated the procedure well.          I have reviewed above labs and imaging.     Assessment  56 y.o. 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 All new diagnoses of uncertain prognosis.      The gross hematuria was likely secondary to urinary tract infection.  His urine is now clear today.  The catheter was left out after the cystoscopy.  In a separate room and separate encounter after the cystoscopy, we discussed the risks, benefits, and alternatives the below major surgery.  The main risk that we discussed were bleeding, infection, damage to nearby structures such as the bowel, spleen, pancreas, with need for resection of any potential adjacent structures, even death.  We discussed the right upper lobe lung nodule, which I suspect is unrelated, but will get Dr. Neff's opinion.  We discussed the likelihood that the mass is renal cell cancer, and the usual management of renal cell cancer.  Pathology will dictate any adjuvant therapy that is needed, however most patients do not receive adjuvant chemotherapy, immunotherapy, or radiation, unless there are high risk features, metastasis, or positive margins.     Plan  1. Schedule Left open radical nephrectomy w/ adrenalectomy 2/16/22. Major urgent surgery.   2. Pulmonology referral for RUL nodule

## 2022-01-31 ENCOUNTER — OFFICE VISIT (OUTPATIENT)
Dept: PULMONOLOGY | Facility: CLINIC | Age: 57
End: 2022-01-31

## 2022-01-31 ENCOUNTER — HOSPITAL ENCOUNTER (EMERGENCY)
Facility: HOSPITAL | Age: 57
Discharge: HOME | End: 2022-01-31
Attending: EMERGENCY MEDICINE | Admitting: EMERGENCY MEDICINE

## 2022-01-31 VITALS
WEIGHT: 216 LBS | SYSTOLIC BLOOD PRESSURE: 150 MMHG | DIASTOLIC BLOOD PRESSURE: 90 MMHG | RESPIRATION RATE: 18 BRPM | BODY MASS INDEX: 30.24 KG/M2 | HEIGHT: 71 IN | HEART RATE: 92 BPM | OXYGEN SATURATION: 98 %

## 2022-01-31 VITALS
RESPIRATION RATE: 16 BRPM | HEIGHT: 71 IN | HEART RATE: 88 BPM | DIASTOLIC BLOOD PRESSURE: 87 MMHG | BODY MASS INDEX: 30.24 KG/M2 | SYSTOLIC BLOOD PRESSURE: 132 MMHG | TEMPERATURE: 97.9 F | OXYGEN SATURATION: 100 % | WEIGHT: 216 LBS

## 2022-01-31 DIAGNOSIS — R93.89 ABNORMAL CT OF THE CHEST: Primary | ICD-10-CM

## 2022-01-31 DIAGNOSIS — R31.9 HEMATURIA, UNSPECIFIED TYPE: ICD-10-CM

## 2022-01-31 DIAGNOSIS — R91.1 LUNG NODULE, SOLITARY: ICD-10-CM

## 2022-01-31 DIAGNOSIS — R33.9 URINARY RETENTION: Primary | ICD-10-CM

## 2022-01-31 PROCEDURE — 99282 EMERGENCY DEPT VISIT SF MDM: CPT

## 2022-01-31 PROCEDURE — 99204 OFFICE O/P NEW MOD 45 MIN: CPT | Performed by: INTERNAL MEDICINE

## 2022-01-31 PROCEDURE — 51702 INSERT TEMP BLADDER CATH: CPT

## 2022-01-31 PROCEDURE — 51798 US URINE CAPACITY MEASURE: CPT

## 2022-01-31 NOTE — PROGRESS NOTES
"  CONSULT NOTE    Requested by:   Nabeel Bullock MD      Chief Complaint   Patient presents with   • Consult   • Breathing Problem       Subjective:  Tobias Newberry is a 56 y.o. male.     History of Present Illness   Patient comes in today for consultation because of abnormal CT.    Patient underwent a CT abdomen due to symptoms of hematuria. He was found to have left renal mass. A work up including CT chest was done that showed 10 millimeter lung nodule for which a pulmonology consultation was requested.    The patient describes no family members with a history of lung cancer.      Patient reports smoking 1/2-1 packs per day for the past 3 years.  Quit 6 years ago.    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     The following portions of the patient's history were reviewed and updated as appropriate: allergies, current medications, past family history, past medical history, past social history and past surgical history.    Review of Systems   HENT: Negative for sinus pressure.    Respiratory: Positive for cough.    All other systems reviewed and are negative.      History reviewed. No pertinent past medical history.    Social History     Tobacco Use   • Smoking status: Former Smoker     Packs/day: 1.50     Years: 4.00     Pack years: 6.00     Quit date: 1/10/2016     Years since quittin.0   • Smokeless tobacco: Current User     Types: Chew   Substance Use Topics   • Alcohol use: Yes     Comment: SOCIAL - WEEKENDS          Objective:  Visit Vitals  /90   Pulse 92   Resp 18   Ht 180.3 cm (71\")   Wt 98 kg (216 lb)   SpO2 98%   BMI 30.13 kg/m²       Physical Exam  Vitals reviewed.   Constitutional:       Appearance: He is well-developed.   HENT:      Head:      Comments: No acute lesions noted     Mouth/Throat:      Mouth: Mucous membranes are moist.   Neck:      Thyroid: No thyromegaly.      Vascular: No JVD.   Cardiovascular:      Rate and Rhythm: Normal rate and regular rhythm.      Heart sounds: No " murmur heard.      Pulmonary:      Effort: Pulmonary effort is normal. No respiratory distress.      Breath sounds: No wheezing or rales.   Musculoskeletal:      Cervical back: Neck supple.      Comments: Gait was normal.   Skin:     General: Skin is warm and dry.   Neurological:      Mental Status: He is alert and oriented to person, place, and time.   Psychiatric:         Behavior: Behavior normal.           Assessment/Plan:  Diagnoses and all orders for this visit:    1. Abnormal CT of the chest (Primary)  -     CT Chest Without Contrast Diagnostic; Future    2. Lung nodule, solitary  -     CT Chest Without Contrast Diagnostic; Future        Return in about 3 months (around 4/20/2022) for Recheck, Imaging, For Desiree Mosquera ([place]).    DISCUSSION(if any):  Last CT scan was reviewed in great detail with the patient. Images reviewed personally.   Results for orders placed during the hospital encounter of 01/20/22    CT Chest With & Without Contrast Diagnostic    Narrative  PROCEDURE: CT CHEST W WO CONTRAST DIAGNOSTIC-    HISTORY: eval for pulm mets; N28.89-Other specified disorders of kidney  and ureter    COMPARISON: CT of the abdomen and pelvis dated 01/16/2022.    PROCEDURE: Multiple axial CT images were obtained from the thoracic  inlet through the upper abdomen following the administration of  intravenous contrast.    FINDINGS:  Soft tissue windows demonstrate no adenopathy within the chest. The  heart is normal in size. The aorta is normal in caliber.There is no  pericardial or pleural effusion. Lung windows demonstrate a noncalcified  nodule in the anterior right upper lobe measuring 10 mm. No other  noncalcified nodules are identified.  Within the visualized upper  abdomen there is a partially imaged left renal mass. A cyst is noted in  the left lobe of the liver. Bone windows reveal no lytic or destructive  lesions.    Impression  10 mm nonspecific nodule in the right upper lobe. Short  interval  follow-up is recommended to ensure stability.      This study was performed with techniques to keep radiation doses as low  as reasonably achievable (ALARA). Individualized dose reduction  techniques using automated exposure control or adjustment of mA and/or  kV according to the patient size were employed.    This report was finalized on 1/20/2022 12:22 PM by Bob Parker M.D..    Unlikely that the right sided nodule in the lung represents metastatic disease.    Although it is extremely difficult to say with any certainty if this nodule represents metastatic involvement, it appears unlikely given the fact that this nodule is not in the lower lobes and appears to be solitary.  Given the propensity for renal cell carcinoma to have hematogenous spread, it is more likely to cause multiple lung nodules and usually prefers lower lobes.    No mediastinal lymphadenopathy was also identified, that once again goes somewhat against metastatic involvement.    Nonetheless, this nodule will need to be followed very closely in the first step would be to obtain a repeat CT chest in 3 months.    If the nodule grows any further, then definite work-up will be considered including possibly a PET scan followed by biopsy.    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    Given the urgency to proceed with left nephrectomy, I would suggest that the patient undergoes surgical resection of the primary tumor, as even in stage IV renal cell cancer, it is recommended that surgery be performed as primary treatment option.    Metastasectomy remains an option for these patients as well.    As dictated above, repeat CT will be obtained in 3 months from the initial CT of the chest and further recommendation made afterwards.    I have asked him to follow-up with Dr. Obrien very  closely.    The patient was asked to call this office if he develops any weight loss, hemoptysis, night sweats etc.        Dictated utilizing Dragon dictation.    This document was electronically signed by Lillian Neff MD on 01/31/22 at 13:32 EST

## 2022-02-01 NOTE — ED NOTES
Pt presents to ED via PV with complaints of urinary retention since 1930 this date. Pt has hx of kidney cancer with similar episodes in past. Pt denies pain. Pt is aaox4, PWD, REU. DIANE.      Temi Aguilera, RN  01/31/22 2210

## 2022-02-01 NOTE — ED NOTES
Pt catheter bag switched to leg bag. Pt had total volume of 1100 cc of red urine. Pt tolerated well. Pt voices understanding of d/c and follow up instructions. Pt is pwd, aaox4, reu and nadn noted at time of d/c.      Temi Aguilera, RN  01/31/22 2736

## 2022-02-01 NOTE — ED PROVIDER NOTES
Subjective   56-year-old male presents to the ED with a chief complaint of acute urinary retention.  Patient has known kidney cancer and is scheduled for nephrectomy in the next month.  He states that last week he had to come to the ED for acute urinary retention related to hematuria likely related to his kidney cancer.  He had a catheter removed about a week ago and has not had any issues with urinary retention or difficulty in urinating until tonight.  He states that around 730 he felt the need to urinate passed a small amount of blood and was unable to urinate any further.  He has suprapubic/bladder fullness and pain.  No fever chills.  No chest pain or shortness of breath.  No other complaints at this time.          Review of Systems   Genitourinary: Positive for decreased urine volume, difficulty urinating and hematuria.   All other systems reviewed and are negative.      History reviewed. No pertinent past medical history.    No Known Allergies    Past Surgical History:   Procedure Laterality Date   • VASECTOMY         Family History   Problem Relation Age of Onset   • Cancer Mother    • Heart disease Father    • Cancer Brother    • Leukemia Maternal Aunt    • Heart disease Paternal Uncle    • Skin cancer Maternal Grandfather        Social History     Socioeconomic History   • Marital status:    Tobacco Use   • Smoking status: Former Smoker     Packs/day: 1.50     Years: 4.00     Pack years: 6.00     Quit date: 1/10/2016     Years since quittin.0   • Smokeless tobacco: Current User     Types: Chew   Vaping Use   • Vaping Use: Every day   • Substances: Flavoring   Substance and Sexual Activity   • Alcohol use: Yes     Comment: SOCIAL - WEEKENDS    • Drug use: No   • Sexual activity: Yes     Partners: Female           Objective   Physical Exam  Vitals and nursing note reviewed.   Constitutional:       General: He is not in acute distress.     Appearance: He is well-developed. He is not diaphoretic.    HENT:      Head: Normocephalic and atraumatic.      Nose: Nose normal.   Eyes:      Conjunctiva/sclera: Conjunctivae normal.      Pupils: Pupils are equal, round, and reactive to light.   Cardiovascular:      Rate and Rhythm: Normal rate and regular rhythm.   Pulmonary:      Effort: Pulmonary effort is normal. No respiratory distress.      Breath sounds: Normal breath sounds.   Abdominal:      General: There is no distension.      Palpations: Abdomen is soft.      Tenderness: There is no abdominal tenderness.      Comments: Suprapubic tenderness to palpation   Musculoskeletal:         General: No deformity.   Neurological:      Mental Status: He is alert and oriented to person, place, and time.      Cranial Nerves: No cranial nerve deficit.      Coordination: Coordination normal.         Procedures           ED Course                                                 MDM    56-year-old male presents to the ED with acute urinary retention.  Patient with a known history of bladder cancer.  Bladder scan showed greater than 1 L in the bladder.  Ross catheter was placed with immediate output of blood-tinged clearish urine.  Patient symptoms improved dramatically.  Offered CT for evaluation of possible clot in the bladder but patient states that he had no issue with the last catheter that he had in place and did not clot off at any point and prefers not to have the CT at this time.  Just wishes to be discharged and will follow up with his urologist in a few days.    Final diagnoses:   Urinary retention   Hematuria, unspecified type       ED Disposition  ED Disposition     ED Disposition Condition Comment    Discharge Stable           Nabeel Bullock MD  [address]  [address]  [facility] [address] [phone]          Manjinder Obrien MD  [address] [facility]  [address]  [facility] [id number] [phone]    Schedule an appointment as soon as possible for a visit in 3 days           Medication List      No changes were  made to your prescriptions during this visit.          Adolph Delgado, DO  01/31/22 4495

## 2022-02-02 ENCOUNTER — PRE-ADMISSION TESTING (OUTPATIENT)
Dept: PREADMISSION TESTING | Facility: HOSPITAL | Age: 57
End: 2022-02-02

## 2022-02-02 VITALS — WEIGHT: 215 LBS | HEIGHT: 71 IN | BODY MASS INDEX: 30.1 KG/M2

## 2022-02-02 DIAGNOSIS — N28.89 RENAL MASS: ICD-10-CM

## 2022-02-02 LAB
ABO GROUP BLD: NORMAL
RH BLD: POSITIVE

## 2022-02-02 PROCEDURE — 86900 BLOOD TYPING SEROLOGIC ABO: CPT

## 2022-02-02 PROCEDURE — 85027 COMPLETE CBC AUTOMATED: CPT | Performed by: UROLOGY

## 2022-02-02 PROCEDURE — 86901 BLOOD TYPING SEROLOGIC RH(D): CPT

## 2022-02-02 PROCEDURE — 93005 ELECTROCARDIOGRAM TRACING: CPT

## 2022-02-02 PROCEDURE — 80048 BASIC METABOLIC PNL TOTAL CA: CPT | Performed by: UROLOGY

## 2022-02-05 LAB
QT INTERVAL: 358 MS
QTC INTERVAL: 418 MS

## 2022-02-09 ENCOUNTER — PATIENT ROUNDING (BHMG ONLY) (OUTPATIENT)
Dept: PULMONOLOGY | Facility: CLINIC | Age: 57
End: 2022-02-09

## 2022-02-10 ENCOUNTER — OFFICE VISIT (OUTPATIENT)
Dept: UROLOGY | Facility: CLINIC | Age: 57
End: 2022-02-10

## 2022-02-10 VITALS
TEMPERATURE: 97.7 F | HEIGHT: 71 IN | WEIGHT: 215 LBS | SYSTOLIC BLOOD PRESSURE: 152 MMHG | HEART RATE: 96 BPM | BODY MASS INDEX: 30.1 KG/M2 | OXYGEN SATURATION: 99 % | DIASTOLIC BLOOD PRESSURE: 86 MMHG

## 2022-02-10 DIAGNOSIS — R33.9 URINARY RETENTION: Primary | ICD-10-CM

## 2022-02-10 PROCEDURE — 99214 OFFICE O/P EST MOD 30 MIN: CPT | Performed by: UROLOGY

## 2022-02-10 RX ORDER — TAMSULOSIN HYDROCHLORIDE 0.4 MG/1
1 CAPSULE ORAL NIGHTLY
Qty: 30 CAPSULE | Refills: 11 | Status: ON HOLD | OUTPATIENT
Start: 2022-02-10 | End: 2022-02-16

## 2022-02-10 RX ORDER — SULFAMETHOXAZOLE AND TRIMETHOPRIM 800; 160 MG/1; MG/1
1 TABLET ORAL DAILY
Qty: 10 TABLET | Refills: 0 | Status: ON HOLD | OUTPATIENT
Start: 2022-02-10 | End: 2022-02-16

## 2022-02-10 NOTE — PROGRESS NOTES
"Chief Complaint   Patient presents with   • Follow-up     pt in office for follow up, jeong in        HPI  Ms. Newberry is a 56 y.o. male with history below in assessment, who presents for follow up.     At this visit patient is here to get indwelling Jeong catheter out prior to his scheduled surgery next week.    Past Medical History:   Diagnosis Date   • Anxiety    • Cancer (HCC)     renal cell carcinoma   • Depression     recent due to diagnosis   • Jeong catheter in place 01/31/2022    Placed in ED   • Hematuria    • Hypertension    • PONV (postoperative nausea and vomiting)    • Tattoo    • Tinnitus    • Wears glasses        Past Surgical History:   Procedure Laterality Date   • VASECTOMY           Current Outpatient Medications:   •  amLODIPine (NORVASC) 2.5 MG tablet, Take 1 tablet by mouth Daily., Disp: 30 tablet, Rfl: 1     Physical Exam  Visit Vitals  /86   Pulse 96   Temp 97.7 °F (36.5 °C)   Ht 180.3 cm (71\")   Wt 97.5 kg (215 lb)   SpO2 99%   BMI 29.99 kg/m²       Labs  Brief Urine Lab Results  (Last result in the past 365 days)      Color   Clarity   Blood   Leuk Est   Nitrite   Protein   CREAT   Urine HCG        01/16/22 1903 Red   Cloudy   Large (3+)   Moderate (2+)   Positive   >=300 mg/dL (3+)                 Lab Results   Component Value Date    GLUCOSE 118 (H) 02/02/2022    CALCIUM 9.7 02/02/2022     (L) 02/02/2022    K 4.2 02/02/2022    CO2 24.3 02/02/2022    CL 96 (L) 02/02/2022    BUN 17 02/02/2022    CREATININE 1.28 (H) 02/02/2022    EGFRIFAFRI 85 08/14/2018    EGFRIFNONA 58 (L) 02/02/2022    BCR 13.3 02/02/2022    ANIONGAP 13.7 02/02/2022       Lab Results   Component Value Date    WBC 7.09 02/02/2022    HGB 14.5 02/02/2022    HCT 42.0 02/02/2022    MCV 87.1 02/02/2022     02/02/2022       Urine Culture    Urine Culture 1/8/22   Urine Culture Final report              Lab Results   Component Value Date    PSA 0.705 08/14/2018             Radiographic Studies  CT Abdomen " Pelvis With & Without Contrast    Result Date: 1/16/2022  Locally advanced left renal cell carcinoma with possible left adrenal metastasis. Authenticated by Luís Montes De Oca M.D. on 01/16/2022 10:31:22 PM    CT Abdomen Pelvis Without Contrast    Result Date: 1/16/2022  Left renal mass worrisome for carcinoma with possible tumor thrombus in the left renal vein.  CT of the abdomen with and without contrast is recommended for further evaluation. Increased left ureteral dilation is likely due to obstructive effects of the blood clot in the urinary bladder. Authenticated by Luís Montes De Oca M.D. on 01/16/2022 09:13:09 PM    CT Chest With & Without Contrast Diagnostic    Result Date: 1/20/2022  10 mm nonspecific nodule in the right upper lobe. Short interval follow-up is recommended to ensure stability.   This study was performed with techniques to keep radiation doses as low as reasonably achievable (ALARA). Individualized dose reduction techniques using automated exposure control or adjustment of mA and/or kV according to the patient size were employed.  This report was finalized on 1/20/2022 12:22 PM by Bob Parker M.D..    XR Abdomen KUB    Result Date: 1/12/2022   Unremarkable KUB.  This report was finalized on 1/12/2022 12:45 PM by Wero Drake MD.          I have reviewed above labs and imaging.     Assessment  56 y.o. 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  These are of unclear etiology, as we are not sure if it is bleeding from his large renal mass versus BPH, with blood in the urine after he strains to pee.    Plan  1.  Fill and void today.  He passed this with a postvoid residual of 0 mL.  2.  Start Flomax  3.  Prophylactic antibiotics until surgery, as he has had an indwelling catheter in the past.

## 2022-02-14 ENCOUNTER — LAB (OUTPATIENT)
Dept: LAB | Facility: HOSPITAL | Age: 57
End: 2022-02-14

## 2022-02-14 DIAGNOSIS — N28.89 RENAL MASS: ICD-10-CM

## 2022-02-14 LAB — SARS-COV-2 RNA PNL SPEC NAA+PROBE: NOT DETECTED

## 2022-02-14 PROCEDURE — U0004 COV-19 TEST NON-CDC HGH THRU: HCPCS

## 2022-02-14 PROCEDURE — C9803 HOPD COVID-19 SPEC COLLECT: HCPCS

## 2022-02-15 ENCOUNTER — LAB (OUTPATIENT)
Dept: LAB | Facility: HOSPITAL | Age: 57
End: 2022-02-15

## 2022-02-15 LAB
ABO GROUP BLD: NORMAL
BLD GP AB SCN SERPL QL: NEGATIVE
RH BLD: POSITIVE
T&S EXPIRATION DATE: NORMAL

## 2022-02-15 PROCEDURE — 86920 COMPATIBILITY TEST SPIN: CPT

## 2022-02-15 PROCEDURE — 86900 BLOOD TYPING SEROLOGIC ABO: CPT

## 2022-02-15 PROCEDURE — 86850 RBC ANTIBODY SCREEN: CPT

## 2022-02-15 PROCEDURE — 86901 BLOOD TYPING SEROLOGIC RH(D): CPT

## 2022-02-15 PROCEDURE — 36415 COLL VENOUS BLD VENIPUNCTURE: CPT

## 2022-02-16 ENCOUNTER — HOSPITAL ENCOUNTER (INPATIENT)
Facility: HOSPITAL | Age: 57
LOS: 2 days | Discharge: HOME | End: 2022-02-18
Attending: UROLOGY | Admitting: UROLOGY

## 2022-02-16 ENCOUNTER — ANESTHESIA EVENT (OUTPATIENT)
Dept: PERIOP | Facility: HOSPITAL | Age: 57
End: 2022-02-16

## 2022-02-16 ENCOUNTER — ANESTHESIA (OUTPATIENT)
Dept: PERIOP | Facility: HOSPITAL | Age: 57
End: 2022-02-16

## 2022-02-16 ENCOUNTER — ANESTHESIA EVENT CONVERTED (OUTPATIENT)
Dept: ANESTHESIOLOGY | Facility: HOSPITAL | Age: 57
End: 2022-02-16

## 2022-02-16 DIAGNOSIS — N28.89 RENAL MASS: ICD-10-CM

## 2022-02-16 LAB
ANION GAP SERPL CALCULATED.3IONS-SCNC: 10.4 MMOL/L (ref 5–15)
BUN SERPL-MCNC: 18 MG/DL (ref 6–20)
BUN/CREAT SERPL: 12.9 (ref 7–25)
CALCIUM SPEC-SCNC: 9.3 MG/DL (ref 8.6–10.5)
CHLORIDE SERPL-SCNC: 104 MMOL/L (ref 98–107)
CO2 SERPL-SCNC: 24.6 MMOL/L (ref 22–29)
CREAT SERPL-MCNC: 1.39 MG/DL (ref 0.76–1.27)
GFR SERPL CREATININE-BSD FRML MDRD: 53 ML/MIN/1.73
GLUCOSE SERPL-MCNC: 128 MG/DL (ref 65–99)
POTASSIUM SERPL-SCNC: 3.8 MMOL/L (ref 3.5–5.2)
SODIUM SERPL-SCNC: 139 MMOL/L (ref 136–145)

## 2022-02-16 PROCEDURE — 86900 BLOOD TYPING SEROLOGIC ABO: CPT

## 2022-02-16 PROCEDURE — 0TT10ZZ RESECTION OF LEFT KIDNEY, OPEN APPROACH: ICD-10-PCS | Performed by: UROLOGY

## 2022-02-16 PROCEDURE — 0 CEFAZOLIN SODIUM-DEXTROSE 2-3 GM-%(50ML) RECONSTITUTED SOLUTION: Performed by: UROLOGY

## 2022-02-16 PROCEDURE — 80048 BASIC METABOLIC PNL TOTAL CA: CPT | Performed by: UROLOGY

## 2022-02-16 PROCEDURE — C1889 IMPLANT/INSERT DEVICE, NOC: HCPCS | Performed by: UROLOGY

## 2022-02-16 PROCEDURE — 25010000002 DEXAMETHASONE PER 1 MG: Performed by: NURSE ANESTHETIST, CERTIFIED REGISTERED

## 2022-02-16 PROCEDURE — 60540 EXPLORE ADRENAL GLAND: CPT | Performed by: UROLOGY

## 2022-02-16 PROCEDURE — 25010000002 HYDROMORPHONE 1 MG/ML SOLUTION: Performed by: NURSE ANESTHETIST, CERTIFIED REGISTERED

## 2022-02-16 PROCEDURE — 50230 REMOVAL KIDNEY OPEN RADICAL: CPT | Performed by: UROLOGY

## 2022-02-16 PROCEDURE — 0GT20ZZ RESECTION OF LEFT ADRENAL GLAND, OPEN APPROACH: ICD-10-PCS | Performed by: UROLOGY

## 2022-02-16 PROCEDURE — 25010000002 HYDROMORPHONE PER 4 MG: Performed by: NURSE ANESTHETIST, CERTIFIED REGISTERED

## 2022-02-16 PROCEDURE — 25010000002 MORPHINE SULFATE (PF) 2 MG/ML SOLUTION: Performed by: UROLOGY

## 2022-02-16 PROCEDURE — P9016 RBC LEUKOCYTES REDUCED: HCPCS

## 2022-02-16 PROCEDURE — 25010000002 FENTANYL CITRATE (PF) 100 MCG/2ML SOLUTION: Performed by: NURSE ANESTHETIST, CERTIFIED REGISTERED

## 2022-02-16 PROCEDURE — 94799 UNLISTED PULMONARY SVC/PX: CPT

## 2022-02-16 PROCEDURE — 25010000002 ONDANSETRON PER 1 MG: Performed by: NURSE ANESTHETIST, CERTIFIED REGISTERED

## 2022-02-16 PROCEDURE — 36430 TRANSFUSION BLD/BLD COMPNT: CPT

## 2022-02-16 PROCEDURE — 25010000002 PROPOFOL 200 MG/20ML EMULSION: Performed by: NURSE ANESTHETIST, CERTIFIED REGISTERED

## 2022-02-16 DEVICE — ENDOPATH ECHELON ENDOSCOPIC LINEAR CUTTER RELOADS, WHITE, 60MM
Type: IMPLANTABLE DEVICE | Site: ABDOMEN | Status: FUNCTIONAL
Brand: ECHELON ENDOPATH

## 2022-02-16 DEVICE — FLOSEAL HEMOSTATIC MATRIX, 10ML
Type: IMPLANTABLE DEVICE | Site: ABDOMEN | Status: FUNCTIONAL
Brand: FLOSEAL HEMOSTATIC MATRIX

## 2022-02-16 DEVICE — LIGACLIP MCA MULTIPLE CLIP APPLIERS, 20 LARGE CLIPS
Type: IMPLANTABLE DEVICE | Site: ABDOMEN | Status: FUNCTIONAL
Brand: LIGACLIP

## 2022-02-16 DEVICE — DEV CONTRL TISS STRATAFIX SPIRAL MNCRYL UD 3/0 PLS 60CM: Type: IMPLANTABLE DEVICE | Site: ABDOMEN | Status: FUNCTIONAL

## 2022-02-16 RX ORDER — DOCUSATE SODIUM 100 MG/1
100 CAPSULE, LIQUID FILLED ORAL 2 TIMES DAILY
Status: DISCONTINUED | OUTPATIENT
Start: 2022-02-16 | End: 2022-02-18 | Stop reason: HOSPADM

## 2022-02-16 RX ORDER — ONDANSETRON 2 MG/ML
INJECTION INTRAMUSCULAR; INTRAVENOUS AS NEEDED
Status: DISCONTINUED | OUTPATIENT
Start: 2022-02-16 | End: 2022-02-16 | Stop reason: SURG

## 2022-02-16 RX ORDER — MAGNESIUM HYDROXIDE 1200 MG/15ML
LIQUID ORAL AS NEEDED
Status: DISCONTINUED | OUTPATIENT
Start: 2022-02-16 | End: 2022-02-16 | Stop reason: HOSPADM

## 2022-02-16 RX ORDER — ROCURONIUM BROMIDE 10 MG/ML
INJECTION, SOLUTION INTRAVENOUS AS NEEDED
Status: DISCONTINUED | OUTPATIENT
Start: 2022-02-16 | End: 2022-02-16 | Stop reason: SURG

## 2022-02-16 RX ORDER — ONDANSETRON 2 MG/ML
4 INJECTION INTRAMUSCULAR; INTRAVENOUS EVERY 6 HOURS PRN
Status: DISCONTINUED | OUTPATIENT
Start: 2022-02-16 | End: 2022-02-18 | Stop reason: HOSPADM

## 2022-02-16 RX ORDER — ACETAMINOPHEN 325 MG/1
650 TABLET ORAL EVERY 6 HOURS
Status: DISCONTINUED | OUTPATIENT
Start: 2022-02-16 | End: 2022-02-18 | Stop reason: HOSPADM

## 2022-02-16 RX ORDER — ONDANSETRON 2 MG/ML
4 INJECTION INTRAMUSCULAR; INTRAVENOUS ONCE AS NEEDED
Status: DISCONTINUED | OUTPATIENT
Start: 2022-02-16 | End: 2022-02-16 | Stop reason: HOSPADM

## 2022-02-16 RX ORDER — SODIUM CHLORIDE 9 MG/ML
125 INJECTION, SOLUTION INTRAVENOUS CONTINUOUS
Status: DISCONTINUED | OUTPATIENT
Start: 2022-02-16 | End: 2022-02-16

## 2022-02-16 RX ORDER — ACETAMINOPHEN 325 MG/1
650 TABLET ORAL EVERY 6 HOURS
Status: DISCONTINUED | OUTPATIENT
Start: 2022-02-16 | End: 2022-02-16

## 2022-02-16 RX ORDER — LIDOCAINE HYDROCHLORIDE 20 MG/ML
INJECTION, SOLUTION INTRAVENOUS AS NEEDED
Status: DISCONTINUED | OUTPATIENT
Start: 2022-02-16 | End: 2022-02-16 | Stop reason: SURG

## 2022-02-16 RX ORDER — DEXAMETHASONE SODIUM PHOSPHATE 4 MG/ML
INJECTION, SOLUTION INTRA-ARTICULAR; INTRALESIONAL; INTRAMUSCULAR; INTRAVENOUS; SOFT TISSUE AS NEEDED
Status: DISCONTINUED | OUTPATIENT
Start: 2022-02-16 | End: 2022-02-16 | Stop reason: SURG

## 2022-02-16 RX ORDER — MORPHINE SULFATE 2 MG/ML
2 INJECTION, SOLUTION INTRAMUSCULAR; INTRAVENOUS
Status: DISCONTINUED | OUTPATIENT
Start: 2022-02-16 | End: 2022-02-17

## 2022-02-16 RX ORDER — FENTANYL CITRATE 50 UG/ML
INJECTION, SOLUTION INTRAMUSCULAR; INTRAVENOUS AS NEEDED
Status: DISCONTINUED | OUTPATIENT
Start: 2022-02-16 | End: 2022-02-16 | Stop reason: SURG

## 2022-02-16 RX ORDER — BUPIVACAINE HYDROCHLORIDE 2.5 MG/ML
INJECTION, SOLUTION EPIDURAL; INFILTRATION; INTRACAUDAL
Status: DISCONTINUED | OUTPATIENT
Start: 2022-02-16 | End: 2022-02-16 | Stop reason: SURG

## 2022-02-16 RX ORDER — AMLODIPINE BESYLATE 5 MG/1
2.5 TABLET ORAL DAILY
Status: DISCONTINUED | OUTPATIENT
Start: 2022-02-16 | End: 2022-02-18 | Stop reason: HOSPADM

## 2022-02-16 RX ORDER — NEOSTIGMINE METHYLSULFATE 5 MG/5 ML
SYRINGE (ML) INTRAVENOUS AS NEEDED
Status: DISCONTINUED | OUTPATIENT
Start: 2022-02-16 | End: 2022-02-16

## 2022-02-16 RX ORDER — HYDROMORPHONE HCL 110MG/55ML
PATIENT CONTROLLED ANALGESIA SYRINGE INTRAVENOUS AS NEEDED
Status: DISCONTINUED | OUTPATIENT
Start: 2022-02-16 | End: 2022-02-16 | Stop reason: SURG

## 2022-02-16 RX ORDER — PROPOFOL 10 MG/ML
INJECTION, EMULSION INTRAVENOUS AS NEEDED
Status: DISCONTINUED | OUTPATIENT
Start: 2022-02-16 | End: 2022-02-16 | Stop reason: SURG

## 2022-02-16 RX ORDER — ACETAMINOPHEN 650 MG/1
650 SUPPOSITORY RECTAL EVERY 6 HOURS
Status: DISCONTINUED | OUTPATIENT
Start: 2022-02-16 | End: 2022-02-18 | Stop reason: HOSPADM

## 2022-02-16 RX ORDER — LORAZEPAM 2 MG/ML
1 INJECTION INTRAMUSCULAR
Status: DISCONTINUED | OUTPATIENT
Start: 2022-02-16 | End: 2022-02-16 | Stop reason: HOSPADM

## 2022-02-16 RX ORDER — SODIUM CHLORIDE 9 MG/ML
125 INJECTION, SOLUTION INTRAVENOUS CONTINUOUS
Status: DISCONTINUED | OUTPATIENT
Start: 2022-02-16 | End: 2022-02-18

## 2022-02-16 RX ORDER — ONDANSETRON 4 MG/1
4 TABLET, FILM COATED ORAL EVERY 6 HOURS PRN
Status: DISCONTINUED | OUTPATIENT
Start: 2022-02-16 | End: 2022-02-18 | Stop reason: HOSPADM

## 2022-02-16 RX ORDER — BISACODYL 10 MG
10 SUPPOSITORY, RECTAL RECTAL DAILY PRN
Status: DISCONTINUED | OUTPATIENT
Start: 2022-02-16 | End: 2022-02-18 | Stop reason: HOSPADM

## 2022-02-16 RX ORDER — NALOXONE HCL 0.4 MG/ML
0.4 VIAL (ML) INJECTION
Status: DISCONTINUED | OUTPATIENT
Start: 2022-02-16 | End: 2022-02-18 | Stop reason: HOSPADM

## 2022-02-16 RX ORDER — MORPHINE SULFATE 1 MG/ML
INJECTION, SOLUTION EPIDURAL; INTRATHECAL; INTRAVENOUS AS NEEDED
Status: DISCONTINUED | OUTPATIENT
Start: 2022-02-16 | End: 2022-02-16

## 2022-02-16 RX ORDER — CEFAZOLIN SODIUM 2 G/50ML
2 SOLUTION INTRAVENOUS EVERY 8 HOURS
Status: COMPLETED | OUTPATIENT
Start: 2022-02-16 | End: 2022-02-17

## 2022-02-16 RX ORDER — NICOTINE 21 MG/24HR
1 PATCH, TRANSDERMAL 24 HOURS TRANSDERMAL EVERY 24 HOURS
Status: DISCONTINUED | OUTPATIENT
Start: 2022-02-16 | End: 2022-02-18 | Stop reason: HOSPADM

## 2022-02-16 RX ORDER — CEFAZOLIN SODIUM 2 G/50ML
2 SOLUTION INTRAVENOUS ONCE
Status: COMPLETED | OUTPATIENT
Start: 2022-02-16 | End: 2022-02-16

## 2022-02-16 RX ORDER — SCOLOPAMINE TRANSDERMAL SYSTEM 1 MG/1
1 PATCH, EXTENDED RELEASE TRANSDERMAL
Status: DISCONTINUED | OUTPATIENT
Start: 2022-02-16 | End: 2022-02-18 | Stop reason: HOSPADM

## 2022-02-16 RX ORDER — ACETAMINOPHEN 650 MG/1
650 SUPPOSITORY RECTAL EVERY 6 HOURS
Status: DISCONTINUED | OUTPATIENT
Start: 2022-02-16 | End: 2022-02-16

## 2022-02-16 RX ORDER — MEPERIDINE HYDROCHLORIDE 25 MG/ML
12.5 INJECTION INTRAMUSCULAR; INTRAVENOUS; SUBCUTANEOUS
Status: DISCONTINUED | OUTPATIENT
Start: 2022-02-16 | End: 2022-02-16 | Stop reason: HOSPADM

## 2022-02-16 RX ADMIN — ACETAMINOPHEN 650 MG: 325 TABLET ORAL at 15:18

## 2022-02-16 RX ADMIN — CEFAZOLIN SODIUM 2 G: 2 SOLUTION INTRAVENOUS at 15:18

## 2022-02-16 RX ADMIN — SODIUM CHLORIDE 125 ML/HR: 9 INJECTION, SOLUTION INTRAVENOUS at 06:38

## 2022-02-16 RX ADMIN — NICOTINE 1 PATCH: 21 PATCH, EXTENDED RELEASE TRANSDERMAL at 21:13

## 2022-02-16 RX ADMIN — HYDROMORPHONE HYDROCHLORIDE 0.5 MG: 2 INJECTION, SOLUTION INTRAMUSCULAR; INTRAVENOUS; SUBCUTANEOUS at 09:37

## 2022-02-16 RX ADMIN — HYDROMORPHONE HYDROCHLORIDE 1 MG: 2 INJECTION, SOLUTION INTRAMUSCULAR; INTRAVENOUS; SUBCUTANEOUS at 08:34

## 2022-02-16 RX ADMIN — DEXAMETHASONE SODIUM PHOSPHATE 4 MG: 4 INJECTION, SOLUTION INTRAMUSCULAR; INTRAVENOUS at 07:42

## 2022-02-16 RX ADMIN — HYDROMORPHONE HYDROCHLORIDE 0.5 MG: 2 INJECTION, SOLUTION INTRAMUSCULAR; INTRAVENOUS; SUBCUTANEOUS at 08:53

## 2022-02-16 RX ADMIN — AMLODIPINE BESYLATE 2.5 MG: 5 TABLET ORAL at 21:13

## 2022-02-16 RX ADMIN — SODIUM CHLORIDE: 9 INJECTION, SOLUTION INTRAVENOUS at 09:44

## 2022-02-16 RX ADMIN — PROPOFOL 200 MG: 10 INJECTION, EMULSION INTRAVENOUS at 07:42

## 2022-02-16 RX ADMIN — MORPHINE SULFATE 2 MG: 2 INJECTION, SOLUTION INTRAMUSCULAR; INTRAVENOUS at 23:41

## 2022-02-16 RX ADMIN — CEFAZOLIN SODIUM 2 G: 2 SOLUTION INTRAVENOUS at 07:45

## 2022-02-16 RX ADMIN — SCOPALAMINE 1 PATCH: 1 PATCH, EXTENDED RELEASE TRANSDERMAL at 07:21

## 2022-02-16 RX ADMIN — ACETAMINOPHEN 650 MG: 325 TABLET ORAL at 21:07

## 2022-02-16 RX ADMIN — MORPHINE SULFATE 2 MG: 2 INJECTION, SOLUTION INTRAMUSCULAR; INTRAVENOUS at 22:10

## 2022-02-16 RX ADMIN — FENTANYL CITRATE 100 MCG: 50 INJECTION INTRAMUSCULAR; INTRAVENOUS at 07:42

## 2022-02-16 RX ADMIN — ROCURONIUM BROMIDE 50 MG: 10 INJECTION INTRAVENOUS at 07:42

## 2022-02-16 RX ADMIN — ROCURONIUM BROMIDE 25 MG: 10 INJECTION INTRAVENOUS at 08:26

## 2022-02-16 RX ADMIN — CEFAZOLIN SODIUM 2 G: 2 SOLUTION INTRAVENOUS at 23:41

## 2022-02-16 RX ADMIN — MORPHINE SULFATE 2 MG: 2 INJECTION, SOLUTION INTRAMUSCULAR; INTRAVENOUS at 21:07

## 2022-02-16 RX ADMIN — ONDANSETRON 4 MG: 2 INJECTION INTRAMUSCULAR; INTRAVENOUS at 07:42

## 2022-02-16 RX ADMIN — BUPIVACAINE HYDROCHLORIDE 30 ML: 2.5 INJECTION, SOLUTION EPIDURAL; INFILTRATION; INTRACAUDAL; PERINEURAL at 07:06

## 2022-02-16 RX ADMIN — DOCUSATE SODIUM 100 MG: 100 CAPSULE, LIQUID FILLED ORAL at 18:42

## 2022-02-16 RX ADMIN — LIDOCAINE HYDROCHLORIDE 60 MG: 20 INJECTION, SOLUTION INTRAVENOUS at 07:42

## 2022-02-16 RX ADMIN — HYDROMORPHONE HYDROCHLORIDE 0.5 MG: 1 INJECTION, SOLUTION INTRAMUSCULAR; INTRAVENOUS; SUBCUTANEOUS at 10:27

## 2022-02-16 NOTE — NURSING NOTE
I did educate the patient about dietary considerations with one kidney. Alcohol and caffeine should be limited . Maintain good hydration

## 2022-02-16 NOTE — ANESTHESIA PROCEDURE NOTES
Airway  Urgency: elective    Date/Time: 2/16/2022 7:41 AM  Airway not difficult    General Information and Staff    Patient location during procedure: OR  CRNA: Corona Waller CRNA    Indications and Patient Condition  Indications for airway management: airway protection    Preoxygenated: yes  Mask difficulty assessment: 1 - vent by mask    Final Airway Details  Final airway type: endotracheal airway      Successful airway: ETT  Cuffed: yes   Successful intubation technique: direct laryngoscopy  Facilitating devices/methods: intubating stylet  Endotracheal tube insertion site: oral  Blade: Archibald  Blade size: 2  ETT size (mm): 7.0  Cormack-Lehane Classification: grade I - full view of glottis  Placement verified by: chest auscultation and capnometry   Cuff volume (mL): 8  Measured from: lips  ETT/EBT  to lips (cm): 22  Number of attempts at approach: 1  Assessment: lips, teeth, and gum same as pre-op and atraumatic intubation    Additional Comments  Airway placed without problems. Dentition and Lips as pre-induction. ETT cuff inflated to minimal occlusive pressure.

## 2022-02-16 NOTE — ANESTHESIA POSTPROCEDURE EVALUATION
Patient: Tobias Newberry    Procedure Summary     Date: 02/16/22 Room / Location: [facility] OR  /  [facility] OR    Anesthesia Start: 0735 Anesthesia Stop: 1017    Procedure: NEPHRECTOMY OPEN (Left Abdomen) Diagnosis:       Renal mass      (Renal mass [N28.89])    Surgeons: Manjinder Obrien MD Provider: Corona Waller CRNA    Anesthesia Type: general with block ASA Status: 2          Anesthesia Type: general with block    Vitals  Vitals Value Taken Time   /83 02/16/22 1352   Temp 98.6 °F (37 °C) 02/16/22 1352   Pulse 87 02/16/22 1353   Resp 16 02/16/22 1352   SpO2 95 % 02/16/22 1353   Vitals shown include unvalidated device data.        Post Anesthesia Care and Evaluation    Patient location during evaluation: PACU  Patient participation: complete - patient participated  Level of consciousness: awake and alert and awake  Pain score: 3  Pain management: adequate  Airway patency: patent  Anesthetic complications: No anesthetic complications  PONV Status: controlled  Cardiovascular status: acceptable, hemodynamically stable and stable  Respiratory status: acceptable and nasal cannula  Hydration status: acceptable

## 2022-02-16 NOTE — OP NOTE
Preoperative diagnosis  Left renal mass    Postoperative diagnosis  Left renal mass    Procedure performed  1. Left open radical nephrectomy and adrenalectomy     Attending surgeon  Manjinder Obrien MD    Anesthesia  General    EBL  700 mL    Complications  None    Specimen  Left kidney with perihilar lymph nodes  Left adrenal gland with mass    Findings  Kidney was removed intact with mass.  The renal vein was transected well proximal to the palpable tumor thrombus.  Left adrenal gland was removed separately with mass palpated within it.    Indications  Mr. Newberry is a 56 y.o. patient who was found to have a large locally advanced left renal mass with renal vein thrombus.After discussing different treatment options, he opted for a radical nephrectomy.  He now presents for this procedure.   Informed consent was obtained.    Procedure  The patient was taken to the operating room and placed supine on the operating table.  Pre-operative antibiotics were administered.  Bilateral lower extremity SCDs were placed.  After induction of general anesthesia, a Ross catheter was placed without difficulty as was an orogastric tube.  The patient then received a four-quadrant TAP / rectus block with anesthesia. He was maintained in supine and all bony prominences were appropriately padded. He was secured to the table.  He was prepped and draped in a sterile fashion and a timeout was performed.    We began the procedure with a large left hemichevron incision.  This was carried down through the muscle layers, the external oblique fascia, and into the peritoneum.  The falciform ligament was divided with the LigaSure.  The left colon was mobilized from the pelvis to around the splenic flexure. A good plane was developed between the colonic mesentery and Gerota's fascia. This was fully mobilized medially. Attachments to the spleen were taken down. The ureter and gonadal vein were identified inferiorly and reflected laterally and  dissection commenced along the psoas fascia up towards the level of the renal hilum. A single renal artery and renal vein were identified. The renal artery and renal vein were divided with an Endo NINA stapling device separately.  Great care was taken to make sure that the tumor thrombus could be palpated and at the point of transection of the renal vein was well medial to this.  The lateral and superior attachments were taken down.     The adrenal gland was dissected off of the body and removed separately. The ureter and gonadal vein were divided inferiorly between clips and the specimen was fully freed.     The specimen was then removed.  The abdomen was washed out with warm water.  Hemostasis was excellent.  FloSeal was applied to the hilum in the suprarenal fat.  We closed the peritoneum and rectus fascias /external oblique together in a running #1 PDS.  Subcutaneous tissues were irrigated and closed with 3-0 Vicryl suture and skin incisions were closed with subcuticular 3-0 strata fix suture. Dermabond and exofin was applied.    The patient tolerated the procedure well.  All sponge and instrument counts were correct x 2. The patient was taken to the recovery unit without incident.

## 2022-02-16 NOTE — H&P
HPI  Tobias Newberry is a 56 y.o. with history of   1. Renal mass         No recent fevers or new LUTS  Does not take any blood thinners    Past Medical History  Past Medical History:   Diagnosis Date   • Anxiety    • Cancer (HCC)     renal cell carcinoma   • Depression     recent due to diagnosis   • Ross catheter in place 2022    Placed in ED   • Hematuria    • Hypertension    • PONV (postoperative nausea and vomiting)    • Tattoo    • Tinnitus    • Wears glasses        Past Surgical History  Past Surgical History:   Procedure Laterality Date   • VASECTOMY         Medications    Current Facility-Administered Medications:   •  ceFAZolin Sodium-Dextrose (ANCEF) IVPB (duplex) 2 g, 2 g, Intravenous, Once, Manjinder Obrien MD  •  scopolamine patch 1 mg/72 hr, 1 patch, Transdermal, Q72H, Corona Waller CRNA  •  sodium chloride 0.9 % infusion, 125 mL/hr, Intravenous, Continuous, Manjinder Obrien MD, Last Rate: 125 mL/hr at 22 0638, 125 mL/hr at 22 06    Allergies  No Known Allergies    Social History  Social History     Socioeconomic History   • Marital status:    Tobacco Use   • Smoking status: Former Smoker     Packs/day: 1.50     Years: 4.00     Pack years: 6.00     Quit date: 1/10/2016     Years since quittin.1   • Smokeless tobacco: Current User     Types: Chew   Vaping Use   • Vaping Use: Every day   • Substances: Nicotine   • Devices: Refillable tank   Substance and Sexual Activity   • Alcohol use: Yes     Comment: SOCIAL    • Drug use: No   • Sexual activity: Yes     Partners: Female       Review of Systems  Constitutional: No fevers or chills  Skin: Negative for rash  Endocrine: No heat/cold intolerance   Cardiovascular: Negative for chest pain or dyspnea on exertion  Respiratory: Negative for shortness of breath or wheezing  Gastrointestinal: No constipation, nausea or vomiting  Genitourinary: Negative for new lower urinary tract symptoms, current gross  hematuria or dysuria.  Musculoskeletal: No flank pain  Neurological:  Negative for frequent headaches or dizziness  Lymph/Heme: Negative for leg swelling or calf pain.    Physical Exam  Visit Vitals  /97 (BP Location: Right arm, Patient Position: Sitting)   Pulse 94   Temp 98.1 °F (36.7 °C) (Temporal)   Resp 18   SpO2 98%     Constitutional: NAD, WDWN.   HEENT: NCAT. Conjunctivae normal.  MMM.    Cardiovascular: Regular rate.  Pulmonary/Chest: Respirations are even and non-labored bilaterally.  Abdominal: Soft. No distension, tenderness, masses or guarding. No CVA tenderness.  Neurological: A + O x 3.  Cranial Nerves II-XII grossly intact. Normal gait.  Extremities: MARIO x 4, Warm. No clubbing.  No cyanosis.    Skin: Pink, warm and dry.  No rashes noted.  Psychiatric:  Normal mood and affect    Labs & Imaging  Lab Results   Component Value Date    GLUCOSE 128 (H) 02/16/2022    CALCIUM 9.3 02/16/2022     02/16/2022    K 3.8 02/16/2022    CO2 24.6 02/16/2022     02/16/2022    BUN 18 02/16/2022    CREATININE 1.39 (H) 02/16/2022    EGFRIFAFRI 85 08/14/2018    EGFRIFNONA 53 (L) 02/16/2022    BCR 12.9 02/16/2022    ANIONGAP 10.4 02/16/2022     Lab Results   Component Value Date    WBC 7.09 02/02/2022    HGB 14.5 02/02/2022    HCT 42.0 02/02/2022    MCV 87.1 02/02/2022     02/02/2022     Brief Urine Lab Results  (Last result in the past 365 days)      Color   Clarity   Blood   Leuk Est   Nitrite   Protein   CREAT   Urine HCG        01/16/22 1903 Red   Cloudy   Large (3+)   Moderate (2+)   Positive   >=300 mg/dL (3+)               Urine Culture    Urine Culture 1/8/22   Urine Culture Final report              CT Chest With & Without Contrast Diagnostic    Result Date: 1/20/2022  PROCEDURE: CT CHEST W WO CONTRAST DIAGNOSTIC-  HISTORY: eval for pulm mets; N28.89-Other specified disorders of kidney and ureter  COMPARISON: CT of the abdomen and pelvis dated 01/16/2022.  PROCEDURE: Multiple axial CT  images were obtained from the thoracic inlet through the upper abdomen following the administration of intravenous contrast.  FINDINGS: Soft tissue windows demonstrate no adenopathy within the chest. The heart is normal in size. The aorta is normal in caliber.There is no pericardial or pleural effusion. Lung windows demonstrate a noncalcified nodule in the anterior right upper lobe measuring 10 mm. No other noncalcified nodules are identified.  Within the visualized upper abdomen there is a partially imaged left renal mass. A cyst is noted in the left lobe of the liver. Bone windows reveal no lytic or destructive lesions.      10 mm nonspecific nodule in the right upper lobe. Short interval follow-up is recommended to ensure stability.   This study was performed with techniques to keep radiation doses as low as reasonably achievable (ALARA). Individualized dose reduction techniques using automated exposure control or adjustment of mA and/or kV according to the patient size were employed.  This report was finalized on 1/20/2022 12:22 PM by Bob Parker M.D..    Peripheral Block    Result Date: 2/16/2022  Corona Waller, CRNA     2/16/2022  7:06 AM Peripheral Block           Assessment  Tobias Newberry is a 56 y.o. male who presents with the following diagnosis:  1. Renal mass         Plan  1. To OR for NEPHRECTOMY OPEN LEFT    Manjinder Obrien MD

## 2022-02-16 NOTE — ANESTHESIA PREPROCEDURE EVALUATION
Anesthesia Evaluation     Patient summary reviewed and Nursing notes reviewed   history of anesthetic complications: PONV  NPO Solid Status: > 8 hours  NPO Liquid Status: > 8 hours           Airway   Mallampati: II  TM distance: >3 FB  Neck ROM: full  Possible difficult intubation  Dental - normal exam     Pulmonary - normal exam   (+) a smoker Former Abstained day of surgery,   Cardiovascular - normal exam    (+) hypertension well controlled less than 2 medications,       Neuro/Psych  (+) psychiatric history Anxiety and Depression,    GI/Hepatic/Renal/Endo    (+)  GERD well controlled,  renal disease,     Musculoskeletal     Abdominal  - normal exam   Substance History   (+) alcohol use,      OB/GYN          Other   arthritis,    history of cancer active                    Anesthesia Plan    ASA 2     general with block     intravenous induction     Anesthetic plan, all risks, benefits, and alternatives have been provided, discussed and informed consent has been obtained with: patient.    Plan discussed with CRNA.        CODE STATUS:

## 2022-02-16 NOTE — ANESTHESIA PROCEDURE NOTES
Peripheral Block    Pre-sedation assessment completed: 2/16/2022 10:00 AM    Patient reassessed immediately prior to procedure    Start time: 2/16/2022 10:05 AM  Stop time: 2/16/2022 10:10 AM  Reason for block: at surgeon's request and post-op pain management  Performed by  CRNA: Corona Waller CRNA  Preanesthetic Checklist  Completed: patient identified, IV checked, site marked, risks and benefits discussed, surgical consent, monitors and equipment checked, pre-op evaluation and timeout performed  Prep:  Pt Position: supine  Sterile barriers:gloves, cap, sterile barriers and mask  Prep: ChloraPrep  Patient monitoring: blood pressure monitoring, continuous pulse oximetry and EKG  Procedure    Guidance:ultrasound guided  Images:still images obtained    Laterality:Bilateral  Block Type:TAP  Injection Technique:single-shot  Needle Type:echogenic  Resistance on Injection: none    Medications Used: bupivacaine PF (MARCAINE) injection 0.25%, 30 mL      Medications  Preservative Free Saline:30ml  Comment:Block Injection: LA dose divided between Right and Left Block  Adjuncts per total volume of LA:          If required, intravenous sedation was given -- see meds on anesthesia record.    Post Assessment  Injection Assessment: negative aspiration for heme, no paresthesia on injection and incremental injection  Patient Tolerance:comfortable throughout block  Complications:no  Additional Notes  Procedure:      BILATERAL TAP BLOCKS                             Patient analgesia was achieved with General Anesthesia    The pt was placed in the Supine Position and under Ultrasound guidance, an echogenic or touhy needle was advanced with Normal Saline hydro dissection of tissue.  The Internal Oblique and Transversus Abdominus muscles were visualized.  At or before the aponeurosis of Internal Oblique, the local anesthetic spread was visualized in the Transversus Abdominus Plane. Injection was made incrementally with  aspiration every 5 mls.  There was no intravascular injection;  injection pressure was normal; there was no neural injection; and the procedure was completed without difficulty.

## 2022-02-17 LAB
ANION GAP SERPL CALCULATED.3IONS-SCNC: 9.6 MMOL/L (ref 5–15)
BASOPHILS # BLD AUTO: 0.01 10*3/MM3 (ref 0–0.2)
BASOPHILS NFR BLD AUTO: 0.1 % (ref 0–1.5)
BUN SERPL-MCNC: 13 MG/DL (ref 6–20)
BUN/CREAT SERPL: 7.4 (ref 7–25)
CALCIUM SPEC-SCNC: 8.9 MG/DL (ref 8.6–10.5)
CHLORIDE SERPL-SCNC: 104 MMOL/L (ref 98–107)
CO2 SERPL-SCNC: 22.4 MMOL/L (ref 22–29)
CREAT SERPL-MCNC: 1.76 MG/DL (ref 0.76–1.27)
DEPRECATED RDW RBC AUTO: 42.5 FL (ref 37–54)
EOSINOPHIL # BLD AUTO: 0.01 10*3/MM3 (ref 0–0.4)
EOSINOPHIL NFR BLD AUTO: 0.1 % (ref 0.3–6.2)
ERYTHROCYTE [DISTWIDTH] IN BLOOD BY AUTOMATED COUNT: 13.2 % (ref 12.3–15.4)
GFR SERPL CREATININE-BSD FRML MDRD: 40 ML/MIN/1.73
GLUCOSE SERPL-MCNC: 117 MG/DL (ref 65–99)
HCT VFR BLD AUTO: 38.2 % (ref 37.5–51)
HGB BLD-MCNC: 12.9 G/DL (ref 13–17.7)
IMM GRANULOCYTES # BLD AUTO: 0.04 10*3/MM3 (ref 0–0.05)
IMM GRANULOCYTES NFR BLD AUTO: 0.3 % (ref 0–0.5)
LYMPHOCYTES # BLD AUTO: 1.46 10*3/MM3 (ref 0.7–3.1)
LYMPHOCYTES NFR BLD AUTO: 12.6 % (ref 19.6–45.3)
MCH RBC QN AUTO: 29.7 PG (ref 26.6–33)
MCHC RBC AUTO-ENTMCNC: 33.8 G/DL (ref 31.5–35.7)
MCV RBC AUTO: 88 FL (ref 79–97)
MONOCYTES # BLD AUTO: 1.12 10*3/MM3 (ref 0.1–0.9)
MONOCYTES NFR BLD AUTO: 9.7 % (ref 5–12)
NEUTROPHILS NFR BLD AUTO: 77.2 % (ref 42.7–76)
NEUTROPHILS NFR BLD AUTO: 8.91 10*3/MM3 (ref 1.7–7)
NRBC BLD AUTO-RTO: 0 /100 WBC (ref 0–0.2)
PLATELET # BLD AUTO: 233 10*3/MM3 (ref 140–450)
PMV BLD AUTO: 9.3 FL (ref 6–12)
POTASSIUM SERPL-SCNC: 4.3 MMOL/L (ref 3.5–5.2)
RBC # BLD AUTO: 4.34 10*6/MM3 (ref 4.14–5.8)
SODIUM SERPL-SCNC: 136 MMOL/L (ref 136–145)
WBC NRBC COR # BLD: 11.55 10*3/MM3 (ref 3.4–10.8)

## 2022-02-17 PROCEDURE — 85025 COMPLETE CBC W/AUTO DIFF WBC: CPT | Performed by: UROLOGY

## 2022-02-17 PROCEDURE — 25010000002 MORPHINE SULFATE (PF) 2 MG/ML SOLUTION: Performed by: UROLOGY

## 2022-02-17 PROCEDURE — 0 CEFAZOLIN SODIUM-DEXTROSE 2-3 GM-%(50ML) RECONSTITUTED SOLUTION: Performed by: UROLOGY

## 2022-02-17 PROCEDURE — 80048 BASIC METABOLIC PNL TOTAL CA: CPT | Performed by: UROLOGY

## 2022-02-17 PROCEDURE — 25010000002 HYDRALAZINE PER 20 MG: Performed by: UROLOGY

## 2022-02-17 RX ORDER — HYDRALAZINE HYDROCHLORIDE 20 MG/ML
10 INJECTION INTRAMUSCULAR; INTRAVENOUS EVERY 6 HOURS PRN
Status: DISCONTINUED | OUTPATIENT
Start: 2022-02-17 | End: 2022-02-18 | Stop reason: HOSPADM

## 2022-02-17 RX ORDER — OXYCODONE HYDROCHLORIDE 5 MG/1
5 TABLET ORAL
Status: DISCONTINUED | OUTPATIENT
Start: 2022-02-17 | End: 2022-02-18 | Stop reason: HOSPADM

## 2022-02-17 RX ORDER — CALCIUM CARBONATE 200(500)MG
2 TABLET,CHEWABLE ORAL 3 TIMES DAILY PRN
Status: DISCONTINUED | OUTPATIENT
Start: 2022-02-17 | End: 2022-02-18 | Stop reason: HOSPADM

## 2022-02-17 RX ADMIN — ACETAMINOPHEN 650 MG: 325 TABLET ORAL at 08:40

## 2022-02-17 RX ADMIN — DOCUSATE SODIUM 100 MG: 100 CAPSULE, LIQUID FILLED ORAL at 08:40

## 2022-02-17 RX ADMIN — DOCUSATE SODIUM 100 MG: 100 CAPSULE, LIQUID FILLED ORAL at 21:06

## 2022-02-17 RX ADMIN — CALCIUM CARBONATE 2 TABLET: 500 TABLET, CHEWABLE ORAL at 21:13

## 2022-02-17 RX ADMIN — HYDRALAZINE HYDROCHLORIDE 10 MG: 20 INJECTION INTRAMUSCULAR; INTRAVENOUS at 11:26

## 2022-02-17 RX ADMIN — ACETAMINOPHEN 650 MG: 325 TABLET ORAL at 21:06

## 2022-02-17 RX ADMIN — OXYCODONE 5 MG: 5 TABLET ORAL at 21:13

## 2022-02-17 RX ADMIN — OXYCODONE 5 MG: 5 TABLET ORAL at 19:22

## 2022-02-17 RX ADMIN — MORPHINE SULFATE 2 MG: 2 INJECTION, SOLUTION INTRAMUSCULAR; INTRAVENOUS at 07:39

## 2022-02-17 RX ADMIN — OXYCODONE 5 MG: 5 TABLET ORAL at 12:54

## 2022-02-17 RX ADMIN — ACETAMINOPHEN 650 MG: 325 TABLET ORAL at 14:22

## 2022-02-17 RX ADMIN — OXYCODONE 5 MG: 5 TABLET ORAL at 17:13

## 2022-02-17 RX ADMIN — MORPHINE SULFATE 2 MG: 2 INJECTION, SOLUTION INTRAMUSCULAR; INTRAVENOUS at 02:50

## 2022-02-17 RX ADMIN — AMLODIPINE BESYLATE 2.5 MG: 5 TABLET ORAL at 08:40

## 2022-02-17 RX ADMIN — ACETAMINOPHEN 650 MG: 325 TABLET ORAL at 02:45

## 2022-02-17 RX ADMIN — CEFAZOLIN SODIUM 2 G: 2 SOLUTION INTRAVENOUS at 08:40

## 2022-02-17 RX ADMIN — OXYCODONE 5 MG: 5 TABLET ORAL at 09:48

## 2022-02-17 RX ADMIN — SODIUM CHLORIDE 125 ML/HR: 9 INJECTION, SOLUTION INTRAVENOUS at 12:49

## 2022-02-17 RX ADMIN — NICOTINE 1 PATCH: 21 PATCH, EXTENDED RELEASE TRANSDERMAL at 21:06

## 2022-02-17 NOTE — PROGRESS NOTES
"Urology Inpatient Progress Note    Subjective  Patient doing very well today.  No nausea or vomiting.  Pain is reasonably well controlled with scheduled Tylenol and as needed narcotics.  He has required IV couple times.    Physical Exam  Visit Vitals  /92 (BP Location: Left arm, Patient Position: Lying)   Pulse 98   Temp 98.1 °F (36.7 °C) (Oral)   Resp 16   Ht 180.3 cm (71\")   Wt 97.7 kg (215 lb 6.2 oz)   SpO2 96%   BMI 30.04 kg/m²     Constitutional: NAD, WDWN.  Cardiovascular: Regular rate.  Pulmonary/Chest: Respirations are even and non-labored bilaterally.  Abdominal: Soft.  Mild distension, moderate tenderness, large incision clean dry and intact.  Extremities: MARIO x 4, Warm. No clubbing.  No cyanosis.    Skin: Pink, warm and dry.  No rashes noted.    Urine crystal-clear    I/O last 3 completed shifts:  In: 800 [I.V.:500; Blood:300]  Out: 4150 [Urine:4150]      Current Facility-Administered Medications:   •  acetaminophen (TYLENOL) tablet 650 mg, 650 mg, Oral, Q6H, 650 mg at 02/17/22 0245 **OR** acetaminophen (TYLENOL) suppository 650 mg, 650 mg, Rectal, Q6H, Manjinder Obrien MD  •  amLODIPine (NORVASC) tablet 2.5 mg, 2.5 mg, Oral, Daily, Manjinder Obrien MD, 2.5 mg at 02/16/22 2113  •  bisacodyl (DULCOLAX) suppository 10 mg, 10 mg, Rectal, Daily PRN, Manjinder Obrien MD  •  ceFAZolin Sodium-Dextrose (ANCEF) IVPB (duplex) 2 g, 2 g, Intravenous, Q8H, Manjinder Obrien MD, 2 g at 02/16/22 2341  •  docusate sodium (COLACE) capsule 100 mg, 100 mg, Oral, BID, Manjinder Obrien MD, 100 mg at 02/16/22 1842  •  hydrALAZINE (APRESOLINE) injection 10 mg, 10 mg, Intravenous, Q6H PRN, Manjinder Obrien MD  •  Morphine sulfate (PF) injection 2 mg, 2 mg, Intravenous, Q1H PRN, 2 mg at 02/17/22 0739 **AND** naloxone (NARCAN) injection 0.4 mg, 0.4 mg, Intravenous, Q5 Min PRN, Manjinder Obrien MD  •  nicotine (NICODERM CQ) 21 MG/24HR patch 1 patch, 1 patch, Transdermal, Q24H, Manjinder Obrien " MD Benja, 1 patch at 02/16/22 2113  •  ondansetron (ZOFRAN) tablet 4 mg, 4 mg, Oral, Q6H PRN **OR** ondansetron (ZOFRAN) injection 4 mg, 4 mg, Intravenous, Q6H PRN, Manjinder Obrien MD  •  scopolamine patch 1 mg/72 hr, 1 patch, Transdermal, Q72H, Corona Waller CRNA, 1 patch at 02/16/22 0721  •  sodium chloride 0.9 % infusion, 125 mL/hr, Intravenous, Continuous, Manjinder Obrien MD    Labs  Lab Results   Component Value Date    GLUCOSE 117 (H) 02/17/2022    CALCIUM 8.9 02/17/2022     02/17/2022    K 4.3 02/17/2022    CO2 22.4 02/17/2022     02/17/2022    BUN 13 02/17/2022    CREATININE 1.76 (H) 02/17/2022    EGFRIFAFRI 85 08/14/2018    EGFRIFNONA 40 (L) 02/17/2022    BCR 7.4 02/17/2022    ANIONGAP 9.6 02/17/2022       Lab Results   Component Value Date    WBC 11.55 (H) 02/17/2022    HGB 12.9 (L) 02/17/2022    HCT 38.2 02/17/2022    MCV 88.0 02/17/2022     02/17/2022       No results found for: URINECX    Brief Urine Lab Results  (Last result in the past 365 days)      Color   Clarity   Blood   Leuk Est   Nitrite   Protein   CREAT   Urine HCG        01/16/22 1903 Red   Cloudy   Large (3+)   Moderate (2+)   Positive   >=300 mg/dL (3+)                   Radiographic Studies  CT Chest With & Without Contrast Diagnostic    Result Date: 1/20/2022  10 mm nonspecific nodule in the right upper lobe. Short interval follow-up is recommended to ensure stability.   This study was performed with techniques to keep radiation doses as low as reasonably achievable (ALARA). Individualized dose reduction techniques using automated exposure control or adjustment of mA and/or kV according to the patient size were employed.  This report was finalized on 1/20/2022 12:22 PM by Shardan Radmanesh, M.D..      Patient Active Problem List   Diagnosis   • Gastroesophageal reflux disease without esophagitis   • Tobacco abuse   • Arthritis   • Cough   • Carcinoma of left kidney (HCC)   • Primary hypertension    • Renal mass       Assessment  56 y.o. male with a PMHx of  has a past medical history of Anxiety, Cancer (HCC), Depression, Ross catheter in place (01/31/2022), Hematuria, Hypertension, PONV (postoperative nausea and vomiting), Tattoo, Tinnitus, and Wears glasses., who is postop day one after left open radical nephrectomy with renal vein thrombectomy and adrenalectomy.    Patient is doing very well.  Excellent urine output.  He is hypertensive.    Plan  1.  Add hydralazine  2.  DC Ross catheter  3.  Advance to regular diet  4.  Main goal is to walk today at least twice with expected discharge tomorrow

## 2022-02-17 NOTE — PLAN OF CARE
Goal Outcome Evaluation:  Plan of Care Reviewed With: patient        Progress: improving  Outcome Summary: Patient had no acute events throughout the day. Patient administered pain medicine and ice packs PRN with improvement in pain. Patient voided post catheter removal with no issue.

## 2022-02-17 NOTE — CASE MANAGEMENT/SOCIAL WORK
Discharge Planning Assessment  [facility]     Patient Name: Tobias Newberry  MRN: [id number]  Today's Date: 2/17/2022    Admit Date: 2/16/2022     Discharge Needs Assessment     Row Name 02/17/22 1113       Living Environment    Lives With spouse    Current Living Arrangements home/apartment/condo    Primary Care Provided by self    Provides Primary Care For no one    Family Caregiver if Needed spouse    Quality of Family Relationships supportive    Able to Return to Prior Arrangements yes    Living Arrangement Comments plans home on d/c       Resource/Environmental Concerns    Resource/Environmental Concerns none       Transition Planning    Patient/Family Anticipates Transition to home with family    Patient/Family Anticipated Services at Transition none    Transportation Anticipated family or friend will provide       Discharge Needs Assessment    Readmission Within the Last 30 Days no previous admission in last 30 days    Equipment Currently Used at Home none    Concerns to be Addressed no discharge needs identified    Anticipated Changes Related to Illness none    Equipment Needed After Discharge none    Provided Post Acute Provider List? N/A    Provided Post Acute Provider Quality & Resource List? N/A               Discharge Plan     Row Name 02/17/22 1115       Plan    Plan pt resting in bed; stated still works and been redoing somethings in his home; plans to go home on d/c; wife will transport; lives in home with spouse; no poa/lw and no info wanted; discussed and given cm info card; no other needs at this time              Continued Care and Services - Admitted Since 2/16/2022    Coordination has not been started for this encounter.          Demographic Summary     Row Name 02/17/22 1110       General Information    Admission Type inpatient    Arrived From PACU/recovery room    Referral Source admission list    Reason for Consult discharge planning    Preferred Language English     Used  During This Interaction no       Contact Information    Permission Granted to Share Info With family/designee               Functional Status     Row Name 02/17/22 1112       Functional Status    Usual Activity Tolerance good    Current Activity Tolerance good    Functional Status Comments works fulltime       Functional Status, IADL    Medications independent    Meal Preparation independent    Housekeeping independent    Laundry independent    Shopping independent       Mental Status    General Appearance WDL WDL       Mental Status Summary    Recent Changes in Mental Status/Cognitive Functioning no changes       Employment/    Employment Status employed full-time    Employment/ Comments [employer]                    Dolly Robins RN

## 2022-02-17 NOTE — CONSULTS
Patient: Tobias Newberry  Procedure(s):  OPEN LEFT RADICAL NEPHRECTOMY WITH LEFT ADRENALECTOMY  Anesthesia type: general with block    Patient location: [facility] Surgical Floor  Last vitals:   Vitals:    02/17/22 0836   BP: 160/90   Pulse: 89   Resp: 18   Temp: 97.9 °F (36.6 °C)   SpO2: 99%     Level of consciousness: awake, alert and oriented    Post-anesthesia pain: adequate analgesia  Airway patency: patent  Respiratory: unassisted  Cardiovascular: stable  Hydration: euvolemic    Anesthetic complications: no

## 2022-02-18 ENCOUNTER — READMISSION MANAGEMENT (OUTPATIENT)
Dept: CALL CENTER | Facility: HOSPITAL | Age: 57
End: 2022-02-18

## 2022-02-18 VITALS
RESPIRATION RATE: 20 BRPM | HEIGHT: 71 IN | SYSTOLIC BLOOD PRESSURE: 156 MMHG | OXYGEN SATURATION: 96 % | TEMPERATURE: 97.7 F | BODY MASS INDEX: 30.15 KG/M2 | HEART RATE: 96 BPM | DIASTOLIC BLOOD PRESSURE: 86 MMHG | WEIGHT: 215.39 LBS

## 2022-02-18 LAB
ANION GAP SERPL CALCULATED.3IONS-SCNC: 8.8 MMOL/L (ref 5–15)
BUN SERPL-MCNC: 12 MG/DL (ref 6–20)
BUN/CREAT SERPL: 7.8 (ref 7–25)
CALCIUM SPEC-SCNC: 9.5 MG/DL (ref 8.6–10.5)
CHLORIDE SERPL-SCNC: 101 MMOL/L (ref 98–107)
CO2 SERPL-SCNC: 26.2 MMOL/L (ref 22–29)
CREAT SERPL-MCNC: 1.53 MG/DL (ref 0.76–1.27)
DEPRECATED RDW RBC AUTO: 43.6 FL (ref 37–54)
ERYTHROCYTE [DISTWIDTH] IN BLOOD BY AUTOMATED COUNT: 13.5 % (ref 12.3–15.4)
GFR SERPL CREATININE-BSD FRML MDRD: 47 ML/MIN/1.73
GLUCOSE SERPL-MCNC: 122 MG/DL (ref 65–99)
HCT VFR BLD AUTO: 37.5 % (ref 37.5–51)
HGB BLD-MCNC: 12.9 G/DL (ref 13–17.7)
MCH RBC QN AUTO: 30.6 PG (ref 26.6–33)
MCHC RBC AUTO-ENTMCNC: 34.4 G/DL (ref 31.5–35.7)
MCV RBC AUTO: 88.9 FL (ref 79–97)
PLATELET # BLD AUTO: 224 10*3/MM3 (ref 140–450)
PMV BLD AUTO: 9.2 FL (ref 6–12)
POTASSIUM SERPL-SCNC: 4.9 MMOL/L (ref 3.5–5.2)
RBC # BLD AUTO: 4.22 10*6/MM3 (ref 4.14–5.8)
SODIUM SERPL-SCNC: 136 MMOL/L (ref 136–145)
WBC NRBC COR # BLD: 14.93 10*3/MM3 (ref 3.4–10.8)

## 2022-02-18 PROCEDURE — 85027 COMPLETE CBC AUTOMATED: CPT | Performed by: UROLOGY

## 2022-02-18 PROCEDURE — 80048 BASIC METABOLIC PNL TOTAL CA: CPT | Performed by: UROLOGY

## 2022-02-18 RX ORDER — DOCUSATE SODIUM 100 MG/1
100 CAPSULE, LIQUID FILLED ORAL 2 TIMES DAILY
Qty: 15 CAPSULE | Refills: 1 | Status: SHIPPED | OUTPATIENT
Start: 2022-02-18 | End: 2022-03-07

## 2022-02-18 RX ORDER — BISACODYL 10 MG
10 SUPPOSITORY, RECTAL RECTAL DAILY
Qty: 10 SUPPOSITORY | Refills: 0 | Status: SHIPPED | OUTPATIENT
Start: 2022-02-18 | End: 2022-03-07 | Stop reason: SDDI

## 2022-02-18 RX ORDER — ACETAMINOPHEN 500 MG
1000 TABLET ORAL EVERY 6 HOURS
Qty: 30 TABLET | Refills: 0 | Status: SHIPPED | OUTPATIENT
Start: 2022-02-18 | End: 2022-02-21

## 2022-02-18 RX ORDER — OXYCODONE HYDROCHLORIDE 5 MG/1
5 TABLET ORAL EVERY 4 HOURS PRN
Qty: 15 TABLET | Refills: 0 | Status: SHIPPED | OUTPATIENT
Start: 2022-02-18 | End: 2022-03-07

## 2022-02-18 RX ORDER — BISACODYL 10 MG
10 SUPPOSITORY, RECTAL RECTAL DAILY
Status: DISCONTINUED | OUTPATIENT
Start: 2022-02-18 | End: 2022-02-18 | Stop reason: HOSPADM

## 2022-02-18 RX ADMIN — OXYCODONE 5 MG: 5 TABLET ORAL at 00:52

## 2022-02-18 RX ADMIN — ACETAMINOPHEN 650 MG: 325 TABLET ORAL at 08:37

## 2022-02-18 RX ADMIN — OXYCODONE 5 MG: 5 TABLET ORAL at 03:21

## 2022-02-18 RX ADMIN — DOCUSATE SODIUM 100 MG: 100 CAPSULE, LIQUID FILLED ORAL at 08:38

## 2022-02-18 RX ADMIN — ACETAMINOPHEN 650 MG: 325 TABLET ORAL at 03:21

## 2022-02-18 RX ADMIN — BISACODYL 10 MG: 10 SUPPOSITORY RECTAL at 08:38

## 2022-02-18 RX ADMIN — AMLODIPINE BESYLATE 2.5 MG: 5 TABLET ORAL at 08:37

## 2022-02-18 NOTE — PROGRESS NOTES
"Urology Inpatient Progress Note    Subjective  Patient says he feels better today than he did yesterday, has been ambulating and overall feels very well.  He is not passing any gas.  He is drinking liquids and tolerating those well, but not much appetite.    Physical Exam  Visit Vitals  /94 (BP Location: Left arm, Patient Position: Lying)   Pulse 93   Temp 98 °F (36.7 °C) (Temporal)   Resp 18   Ht 180.3 cm (71\")   Wt 97.7 kg (215 lb 6.2 oz)   SpO2 95%   BMI 30.04 kg/m²     Constitutional: NAD, WDWN.  Cardiovascular: Regular rate.  Pulmonary/Chest: Respirations are even and non-labored bilaterally.  Abdominal: Soft. moderate distension, mild tenderness, Incis CDI  Extremities: MARIO x 4, Warm. No clubbing.  No cyanosis.    Skin: Pink, warm and dry.  No rashes noted.      I/O last 3 completed shifts:  In: 1961.4 [P.O.:600; I.V.:1361.4]  Out: 7400 [Urine:7400]      Current Facility-Administered Medications:   •  acetaminophen (TYLENOL) tablet 650 mg, 650 mg, Oral, Q6H, 650 mg at 02/18/22 0321 **OR** acetaminophen (TYLENOL) suppository 650 mg, 650 mg, Rectal, Q6H, Manjinder Obrien MD  •  amLODIPine (NORVASC) tablet 2.5 mg, 2.5 mg, Oral, Daily, Manjinder Obrien MD, 2.5 mg at 02/17/22 0840  •  bisacodyl (DULCOLAX) suppository 10 mg, 10 mg, Rectal, Daily PRN, Manjinder Obrien MD  •  calcium carbonate (TUMS) chewable tablet 500 mg (200 mg elemental), 2 tablet, Oral, TID PRN, Manjinder Obrien MD, 2 tablet at 02/17/22 2113  •  docusate sodium (COLACE) capsule 100 mg, 100 mg, Oral, BID, Manjinder Obrien MD, 100 mg at 02/17/22 2106  •  hydrALAZINE (APRESOLINE) injection 10 mg, 10 mg, Intravenous, Q6H PRN, Manjinder Obrien MD, 10 mg at 02/17/22 1126  •  [DISCONTINUED] Morphine sulfate (PF) injection 2 mg, 2 mg, Intravenous, Q1H PRN, 2 mg at 02/17/22 0739 **AND** naloxone (NARCAN) injection 0.4 mg, 0.4 mg, Intravenous, Q5 Min PRN, Manjinder Obrien MD  •  nicotine (NICODERM CQ) 21 MG/24HR " patch 1 patch, 1 patch, Transdermal, Q24H, Manjinder Obrien MD, 1 patch at 02/17/22 2106  •  ondansetron (ZOFRAN) tablet 4 mg, 4 mg, Oral, Q6H PRN **OR** ondansetron (ZOFRAN) injection 4 mg, 4 mg, Intravenous, Q6H PRN, Manjinder Obrien MD  •  oxyCODONE (ROXICODONE) immediate release tablet 5 mg, 5 mg, Oral, Q2H PRN, Manjinder Obrien MD, 5 mg at 02/18/22 0321  •  scopolamine patch 1 mg/72 hr, 1 patch, Transdermal, Q72H, Corona Waller CRNA, 1 patch at 02/16/22 0721  •  sodium chloride 0.9 % infusion, 125 mL/hr, Intravenous, Continuous, Manjinder Obrien MD, Last Rate: 125 mL/hr at 02/17/22 1249, 125 mL/hr at 02/17/22 1249    Labs  Lab Results   Component Value Date    GLUCOSE 117 (H) 02/17/2022    CALCIUM 8.9 02/17/2022     02/17/2022    K 4.3 02/17/2022    CO2 22.4 02/17/2022     02/17/2022    BUN 13 02/17/2022    CREATININE 1.76 (H) 02/17/2022    EGFRIFAFRI 85 08/14/2018    EGFRIFNONA 40 (L) 02/17/2022    BCR 7.4 02/17/2022    ANIONGAP 9.6 02/17/2022       Lab Results   Component Value Date    WBC 14.93 (H) 02/18/2022    HGB 12.9 (L) 02/18/2022    HCT 37.5 02/18/2022    MCV 88.9 02/18/2022     02/18/2022       No results found for: URINECX    Brief Urine Lab Results  (Last result in the past 365 days)      Color   Clarity   Blood   Leuk Est   Nitrite   Protein   CREAT   Urine HCG        01/16/22 1903 Red   Cloudy   Large (3+)   Moderate (2+)   Positive   >=300 mg/dL (3+)                   Radiographic Studies  CT Chest With & Without Contrast Diagnostic    Result Date: 1/20/2022  10 mm nonspecific nodule in the right upper lobe. Short interval follow-up is recommended to ensure stability.   This study was performed with techniques to keep radiation doses as low as reasonably achievable (ALARA). Individualized dose reduction techniques using automated exposure control or adjustment of mA and/or kV according to the patient size were employed.  This report was finalized on  1/20/2022 12:22 PM by Bob Parker M.D..      Patient Active Problem List   Diagnosis   • Gastroesophageal reflux disease without esophagitis   • Tobacco abuse   • Arthritis   • Cough   • Carcinoma of left kidney (HCC)   • Primary hypertension   • Renal mass       Assessment  56 y.o. male with a PMHx of  has a past medical history of Anxiety, Cancer (HCC), Depression, Ross catheter in place (01/31/2022), Hematuria, Hypertension, PONV (postoperative nausea and vomiting), Tattoo, Tinnitus, and Wears glasses., who is POD2 after left open radical nephrectomy, renal vein thrombectomy and adrenalectomy.    Patient is doing very well, but abdomen is little tight.  Would like to see him making some gas and is improving.    Plan  1.  Possible discharge later today   2.  Suppositories.  Increase ambulation

## 2022-02-19 LAB
BH BB BLOOD EXPIRATION DATE: NORMAL
BH BB BLOOD EXPIRATION DATE: NORMAL
BH BB BLOOD TYPE BARCODE: 6200
BH BB BLOOD TYPE BARCODE: 6200
BH BB DISPENSE STATUS: NORMAL
BH BB DISPENSE STATUS: NORMAL
BH BB PRODUCT CODE: NORMAL
BH BB PRODUCT CODE: NORMAL
BH BB UNIT NUMBER: NORMAL
BH BB UNIT NUMBER: NORMAL
CROSSMATCH INTERPRETATION: NORMAL
CROSSMATCH INTERPRETATION: NORMAL
UNIT  ABO: NORMAL
UNIT  ABO: NORMAL
UNIT  RH: NORMAL
UNIT  RH: NORMAL

## 2022-02-19 NOTE — CASE MANAGEMENT/SOCIAL WORK
Case Management Discharge Note           Provided Post Acute Provider List?: N/A  Provided Post Acute Provider Quality & Resource List?: N/A    Selected Continued Care - Discharged on 2/18/2022 Admission date: 2/16/2022 - Discharge disposition: Home or Self Care    Destination    No services have been selected for the patient.              Durable Medical Equipment    No services have been selected for the patient.              Dialysis/Infusion    No services have been selected for the patient.              Home Medical Care    No services have been selected for the patient.              Therapy    No services have been selected for the patient.              Community Resources    No services have been selected for the patient.              Community & DME    No services have been selected for the patient.                       Final Discharge Disposition Code: 01 - home or self-care

## 2022-02-19 NOTE — OUTREACH NOTE
Prep Survey      Responses   Restorationist facility patient discharged from? [facility]   Is LACE score < 7 ? No   Emergency Room discharge w/ pulse ox? No   Eligibility [facility]   Date of Admission 02/16/22   Date of Discharge 02/18/22   Discharge Disposition Home or Self Care   Discharge diagnosis Open left radi al nephrectomy adrenalectomy this visit   Does the patient have one of the following disease processes/diagnoses(primary or secondary)? General Surgery   Does the patient have Home health ordered? No   Is there a DME ordered? No   Prep survey completed? Yes          Rosalee Garcia RN

## 2022-02-21 ENCOUNTER — TRANSITIONAL CARE MANAGEMENT TELEPHONE ENCOUNTER (OUTPATIENT)
Dept: CALL CENTER | Facility: HOSPITAL | Age: 57
End: 2022-02-21

## 2022-02-21 NOTE — DISCHARGE SUMMARY
[facility]   DISCHARGE SUMMARY      Name:  Tobias Newberry   Age:  56 y.o.  Sex:  male  :  1965  MRN:  [id number]   Visit Number:  [id number]  Primary Care Physician:  Nabeel Bullock MD  Date of Discharge:  2022  Admission Date:  2022      Discharge Diagnosis:   Active Hospital Problems    Diagnosis  POA   • **Renal mass [N28.89]  Unknown      Resolved Hospital Problems   No resolved problems to display.         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  He tolerated the procedure well and was discharged on postop day 2 with positive flatus, tolerating regular diet, ambulating, with pain well controlled on oral narcotics and acetaminophen.  He will follow-up in 1 week for pathology discussion.    Procedures Performed:    Procedure(s):  OPEN LEFT RADICAL NEPHRECTOMY WITH LEFT ADRENALECTOMY       Consults:     Consults     No orders found from 2022 to 2022.          Pertinent Test Results:     Lab Results (all)     Procedure Component Value Units Date/Time    Basic Metabolic Panel [[id number]]  (Abnormal) Collected: 22    Specimen: Blood Updated: 22     Glucose 122 mg/dL      BUN 12 mg/dL      Creatinine 1.53 mg/dL      Sodium 136 mmol/L      Potassium 4.9 mmol/L      Chloride 101 mmol/L      CO2 26.2 mmol/L      Calcium 9.5 mg/dL      eGFR Non African Amer 47 mL/min/1.73      BUN/Creatinine Ratio 7.8     Anion Gap 8.8 mmol/L     Narrative:      GFR Normal >60  Chronic Kidney Disease <60  Kidney Failure <15      CBC (No Diff) [[id number]]  (Abnormal) Collected: 22    Specimen: Blood Updated: 22     WBC 14.93 10*3/mm3      RBC 4.22 10*6/mm3      Hemoglobin 12.9 g/dL       Hematocrit 37.5 %      MCV 88.9 fL      MCH 30.6 pg      MCHC 34.4 g/dL      RDW 13.5 %      RDW-SD 43.6 fl      MPV 9.2 fL      Platelets 224 10*3/mm3     Basic Metabolic Panel [[id number]]  (Abnormal) Collected: 02/17/22 0626    Specimen: Blood Updated: 02/17/22 0725     Glucose 117 mg/dL      BUN 13 mg/dL      Creatinine 1.76 mg/dL      Sodium 136 mmol/L      Potassium 4.3 mmol/L      Chloride 104 mmol/L      CO2 22.4 mmol/L      Calcium 8.9 mg/dL      eGFR Non African Amer 40 mL/min/1.73      BUN/Creatinine Ratio 7.4     Anion Gap 9.6 mmol/L     Narrative:      GFR Normal >60  Chronic Kidney Disease <60  Kidney Failure <15      CBC & Differential [[id number]]  (Abnormal) Collected: 02/17/22 0626    Specimen: Blood Updated: 02/17/22 0705    Narrative:      The following orders were created for panel order CBC & Differential.  Procedure                               Abnormality         Status                     ---------                               -----------         ------                     CBC Auto Differential[[id number]]        Abnormal            Final result                 Please view results for these tests on the individual orders.    CBC Auto Differential [[id number]]  (Abnormal) Collected: 02/17/22 0626    Specimen: Blood Updated: 02/17/22 0705     WBC 11.55 10*3/mm3      RBC 4.34 10*6/mm3      Hemoglobin 12.9 g/dL      Hematocrit 38.2 %      MCV 88.0 fL      MCH 29.7 pg      MCHC 33.8 g/dL      RDW 13.2 %      RDW-SD 42.5 fl      MPV 9.3 fL      Platelets 233 10*3/mm3      Neutrophil % 77.2 %      Lymphocyte % 12.6 %      Monocyte % 9.7 %      Eosinophil % 0.1 %      Basophil % 0.1 %      Immature Grans % 0.3 %      Neutrophils, Absolute 8.91 10*3/mm3      Lymphocytes, Absolute 1.46 10*3/mm3      Monocytes, Absolute 1.12 10*3/mm3      Eosinophils, Absolute 0.01 10*3/mm3      Basophils, Absolute 0.01 10*3/mm3      Immature Grans, Absolute 0.04 10*3/mm3      nRBC 0.0 /100 WBC     Tissue Pathology Exam  "[[id number]] Collected: 02/16/22 1007    Specimen: Tissue from Kidney, Left; Tissue from Gland, Adrenal Left Updated: 02/16/22 1035    Basic Metabolic Panel [[id number]]  (Abnormal) Collected: 02/16/22 0619    Specimen: Blood Updated: 02/16/22 0646     Glucose 128 mg/dL      BUN 18 mg/dL      Creatinine 1.39 mg/dL      Sodium 139 mmol/L      Potassium 3.8 mmol/L      Chloride 104 mmol/L      CO2 24.6 mmol/L      Calcium 9.3 mg/dL      eGFR Non African Amer 53 mL/min/1.73      BUN/Creatinine Ratio 12.9     Anion Gap 10.4 mmol/L     Narrative:      GFR Normal >60  Chronic Kidney Disease <60  Kidney Failure <15            Imaging Results (All)     None          Condition on Discharge:    Excellent    Vital Signs:    /86 (BP Location: Left arm, Patient Position: Lying)   Pulse 96   Temp 97.7 °F (36.5 °C) (Temporal)   Resp 20   Ht 180.3 cm (71\")   Wt 97.7 kg (215 lb 6.2 oz)   SpO2 96%   BMI 30.04 kg/m²     Discharge Disposition:    Home or Self Care    Discharge Medication:       Discharge Medications      New Medications      Instructions Start Date   Acetaminophen Extra Strength 500 MG tablet  Generic drug: acetaminophen   Take 2 tablets by mouth Every 6 (Six) Hours      bisacodyl 10 MG suppository  Commonly known as: DULCOLAX   10 mg, Rectal, Daily      docusate sodium 100 MG capsule  Commonly known as: Colace   Take 1 capsule by mouth 2 (Two) Times a Day If taking oxycodone      oxyCODONE 5 MG immediate release tablet  Commonly known as: Roxicodone   Take 1 tablet by mouth Every 4 (Four) Hours As Needed for Moderate or Severe Pain .         Continue These Medications      Instructions Start Date   amLODIPine 2.5 MG tablet  Commonly known as: NORVASC   2.5 mg, Oral, Daily             Discharge Diet:     Diet Instructions    AS TOLERATED            Activity at Discharge:     Activity Instructions    AS TOLERATED AND DIRECTED PER PROVIDER.           Follow-up Appointments:    Future Appointments   Date Time " Provider Department Center   2/24/2022 11:10 AM Manjinder Obrien MD [facility] (   4/18/2022 10:00 AM Eveline Hurtado APRN [facility]         Test Results Pending at Discharge:    Pending Labs     Order Current Status    Tissue Pathology Exam In process             Manjinder Obrien MD  02/20/22  19:45 EST

## 2022-02-21 NOTE — OUTREACH NOTE
Call Center TCM Note      Responses   [facility] [place] patient discharged from? [facility]   Does the patient have one of the following disease processes/diagnoses(primary or secondary)? General Surgery   TCM attempt successful? No   Unsuccessful attempts Attempt 1          Anyi Rolon LPN    2/21/2022, 13:32 EST

## 2022-02-21 NOTE — OUTREACH NOTE
Call Center TCM Note      Responses   [facility] patient discharged from? [facility]   Does the patient have one of the following disease processes/diagnoses(primary or secondary)? General Surgery   TCM attempt successful? Yes   Call start time 1746   Call end time 1751   Discharge diagnosis Open left radi al nephrectomy adrenalectomy this visit   Meds reviewed with patient/caregiver? Yes   Is the patient having any side effects they believe may be caused by any medication additions or changes? No   Does the patient have all medications related to this admission filled (includes all antibiotics, pain medications, etc.) Yes   Is the patient taking all medications as directed (includes completed medication regime)? Yes   Does the patient have a follow up appointment scheduled with their surgeon? Yes   Has the patient kept scheduled appointments due by today? N/A   Comments Appt made with PCP Dr. Bullock for 3/7 at 3:30   Psychosocial issues? No   Did the patient receive a copy of their discharge instructions? Yes   Nursing interventions Reviewed instructions with patient   What is the patient's perception of their health status since discharge? Improving   Nursing interventions Nurse provided patient education   Is the patient /caregiver able to teach back basic post-op care? Lifting as instructed by MD in discharge instructions,  No tub bath, swimming, or hot tub until instructed by MD,  Drive as instructed by MD in discharge instructions,  Practice 'cough and deep breath'   Is the patient/caregiver able to teach back signs and symptoms of incisional infection? Increased redness, swelling or pain at the incisonal site,  Increased drainage or bleeding,  Incisional warmth,  Pus or odor from incision,  Fever   Is the patient/caregiver able to teach back steps to recovery at home? Set small, achievable goals for return to baseline health,  Rest and rebuild strength, gradually increase activity,  Eat a well-balance diet,   Make a list of questions for surgeon's appointment   If the patient is a current smoker, are they able to teach back resources for cessation? Not a smoker   Is the patient/caregiver able to teach back the hierarchy of who to call/visit for symptoms/problems? PCP, Specialist, Home health nurse, Urgent Care, ED, 911 Yes   Additional teach back comments States he is getting better every day.  Did have some bloating but has passed gas and having bms so it has gone down.  No blood in urine.  Incision site looking good and is itching.    TCM call completed? Yes   Wrap up additional comments Appt made with PCP.  No other needs or questions at this time          Anyi Rolon LPN    2/21/2022, 17:53 EST

## 2022-02-22 LAB
LAB AP CASE REPORT: NORMAL
LAB AP CLINICAL INFORMATION: NORMAL
PATH REPORT.FINAL DX SPEC: NORMAL

## 2022-02-24 ENCOUNTER — OFFICE VISIT (OUTPATIENT)
Dept: UROLOGY | Facility: CLINIC | Age: 57
End: 2022-02-24

## 2022-02-24 VITALS — HEIGHT: 71 IN | BODY MASS INDEX: 30.1 KG/M2 | RESPIRATION RATE: 19 BRPM | WEIGHT: 215 LBS

## 2022-02-24 DIAGNOSIS — N28.89 RENAL MASS: Primary | ICD-10-CM

## 2022-02-24 DIAGNOSIS — C64.2 RENAL CELL CARCINOMA OF LEFT KIDNEY: ICD-10-CM

## 2022-02-24 PROCEDURE — 99213 OFFICE O/P EST LOW 20 MIN: CPT | Performed by: UROLOGY

## 2022-02-24 NOTE — PROGRESS NOTES
"Chief Complaint   Patient presents with   • Post-op        HPI  Ms. Newberry is a 56 y.o. male with history below in assessment, who presents for follow up.     At this visit no pain and not using narcotics.     Past Medical History:   Diagnosis Date   • Anxiety    • Cancer (HCC)     renal cell carcinoma   • Depression     recent due to diagnosis   • Ross catheter in place 01/31/2022    Placed in ED   • Hematuria    • Hypertension    • PONV (postoperative nausea and vomiting)    • Tattoo    • Tinnitus    • Wears glasses        Past Surgical History:   Procedure Laterality Date   • NEPHRECTOMY Left 2/16/2022    Procedure: OPEN LEFT RADICAL NEPHRECTOMY WITH LEFT ADRENALECTOMY;  Surgeon: Manjinder Obrien MD;  Location: [facility];  Service: Urology;  Laterality: Left;   • VASECTOMY           Current Outpatient Medications:   •  amLODIPine (NORVASC) 2.5 MG tablet, Take 1 tablet by mouth Daily., Disp: 30 tablet, Rfl: 1  •  bisacodyl (DULCOLAX) 10 MG suppository, Insert 1 suppository into the rectum Daily., Disp: 10 suppository, Rfl: 0  •  docusate sodium (Colace) 100 MG capsule, Take 1 capsule by mouth 2 (Two) Times a Day If taking oxycodone, Disp: 15 capsule, Rfl: 1  •  oxyCODONE (Roxicodone) 5 MG immediate release tablet, Take 1 tablet by mouth Every 4 (Four) Hours As Needed for Moderate or Severe Pain ., Disp: 15 tablet, Rfl: 0     Physical Exam  Visit Vitals  Resp 19   Ht 180.3 cm (70.98\")   Wt 97.5 kg (215 lb)   BMI 30.00 kg/m²     Incision clean dry and intact.  Umbilical hernia is still present but not incarcerated.  Abdomen soft nontender nondistended.    Labs  Brief Urine Lab Results  (Last result in the past 365 days)      Color   Clarity   Blood   Leuk Est   Nitrite   Protein   CREAT   Urine HCG        01/16/22 1903 Red   Cloudy   Large (3+)   Moderate (2+)   Positive   >=300 mg/dL (3+)                 Lab Results   Component Value Date    GLUCOSE 122 (H) 02/18/2022    CALCIUM 9.5 02/18/2022     " 02/18/2022    K 4.9 02/18/2022    CO2 26.2 02/18/2022     02/18/2022    BUN 12 02/18/2022    CREATININE 1.53 (H) 02/18/2022    EGFRIFAFRI 85 08/14/2018    EGFRIFNONA 47 (L) 02/18/2022    BCR 7.8 02/18/2022    ANIONGAP 8.8 02/18/2022       Lab Results   Component Value Date    WBC 14.93 (H) 02/18/2022    HGB 12.9 (L) 02/18/2022    HCT 37.5 02/18/2022    MCV 88.9 02/18/2022     02/18/2022       Urine Culture    Urine Culture 1/8/22   Urine Culture Final report              Lab Results   Component Value Date    PSA 0.705 08/14/2018             Radiographic Studies  CT Abdomen Pelvis With & Without Contrast    Result Date: 1/16/2022  Locally advanced left renal cell carcinoma with possible left adrenal metastasis. Authenticated by Luís Montes De Oca M.D. on 01/16/2022 10:31:22 PM    CT Abdomen Pelvis Without Contrast    Result Date: 1/16/2022  Left renal mass worrisome for carcinoma with possible tumor thrombus in the left renal vein.  CT of the abdomen with and without contrast is recommended for further evaluation. Increased left ureteral dilation is likely due to obstructive effects of the blood clot in the urinary bladder. Authenticated by Luís Montes De Oca M.D. on 01/16/2022 09:13:09 PM    CT Chest With & Without Contrast Diagnostic    Result Date: 1/20/2022  10 mm nonspecific nodule in the right upper lobe. Short interval follow-up is recommended to ensure stability.   This study was performed with techniques to keep radiation doses as low as reasonably achievable (ALARA). Individualized dose reduction techniques using automated exposure control or adjustment of mA and/or kV according to the patient size were employed.  This report was finalized on 1/20/2022 12:22 PM by Bob Parker M.D..            I have reviewed above labs and imaging.     Assessment  56 y.o. 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  Margins all negative.    In the  postop period we discussed different management options per NCCN guidelines, which include clinical trials, surveillance, and adjuvant pembrolizumab or sunitinib.  I answered all of his questions and his wife's questions.    Plan  1.  Referral to oncology to discuss surveillance versus adjuvant pembrolizumab  2.  Follow-up with me in 3 months with scans   3.  BMP today and in 3 months

## 2022-02-25 LAB
BUN SERPL-MCNC: 21 MG/DL (ref 6–20)
BUN/CREAT SERPL: 12.3 (ref 7–25)
CALCIUM SERPL-MCNC: 10.2 MG/DL (ref 8.6–10.5)
CHLORIDE SERPL-SCNC: 97 MMOL/L (ref 98–107)
CO2 SERPL-SCNC: 25.8 MMOL/L (ref 22–29)
CREAT SERPL-MCNC: 1.71 MG/DL (ref 0.76–1.27)
GLUCOSE SERPL-MCNC: 112 MG/DL (ref 65–99)
POTASSIUM SERPL-SCNC: 5.4 MMOL/L (ref 3.5–5.2)
SODIUM SERPL-SCNC: 136 MMOL/L (ref 136–145)

## 2022-02-28 ENCOUNTER — READMISSION MANAGEMENT (OUTPATIENT)
Dept: CALL CENTER | Facility: HOSPITAL | Age: 57
End: 2022-02-28

## 2022-02-28 NOTE — OUTREACH NOTE
General Surgery Week 2 Survey      Responses   [facility] patient discharged from? [facility]   Does the patient have one of the following disease processes/diagnoses(primary or secondary)? General Surgery   Week 2 attempt successful? Yes   Call start time 1405   Call end time 1407   Discharge diagnosis Open left radi al nephrectomy adrenalectomy this visit   Meds reviewed with patient/caregiver? Yes   Is the patient taking all medications as directed (includes completed medication regime)? Yes   Has the patient kept scheduled appointments due by today? Yes   Psychosocial issues? No   What is the patient's perception of their health status since discharge? Improving   Week 2 call completed? Yes          Emily Youngblood RN

## 2022-03-01 ENCOUNTER — CONSULT (OUTPATIENT)
Dept: ONCOLOGY | Facility: CLINIC | Age: 57
End: 2022-03-01

## 2022-03-01 VITALS
RESPIRATION RATE: 18 BRPM | BODY MASS INDEX: 28.7 KG/M2 | DIASTOLIC BLOOD PRESSURE: 95 MMHG | TEMPERATURE: 97.3 F | HEART RATE: 94 BPM | OXYGEN SATURATION: 98 % | SYSTOLIC BLOOD PRESSURE: 166 MMHG | HEIGHT: 71 IN | WEIGHT: 205 LBS

## 2022-03-01 DIAGNOSIS — C64.2 CARCINOMA OF LEFT KIDNEY: Primary | ICD-10-CM

## 2022-03-01 PROCEDURE — 99205 OFFICE O/P NEW HI 60 MIN: CPT | Performed by: INTERNAL MEDICINE

## 2022-03-01 RX ORDER — SODIUM CHLORIDE 9 MG/ML
250 INJECTION, SOLUTION INTRAVENOUS ONCE
Status: CANCELLED | OUTPATIENT
Start: 2022-04-11

## 2022-03-01 RX ORDER — SODIUM CHLORIDE 9 MG/ML
250 INJECTION, SOLUTION INTRAVENOUS ONCE
Status: CANCELLED | OUTPATIENT
Start: 2022-03-18

## 2022-03-07 ENCOUNTER — OFFICE VISIT (OUTPATIENT)
Dept: INTERNAL MEDICINE | Facility: CLINIC | Age: 57
End: 2022-03-07

## 2022-03-07 VITALS
HEIGHT: 71 IN | DIASTOLIC BLOOD PRESSURE: 88 MMHG | BODY MASS INDEX: 29.12 KG/M2 | HEART RATE: 88 BPM | WEIGHT: 208 LBS | SYSTOLIC BLOOD PRESSURE: 128 MMHG | OXYGEN SATURATION: 97 % | TEMPERATURE: 97.8 F

## 2022-03-07 DIAGNOSIS — I10 PRIMARY HYPERTENSION: Primary | ICD-10-CM

## 2022-03-07 DIAGNOSIS — C64.2 CARCINOMA OF LEFT KIDNEY: ICD-10-CM

## 2022-03-07 PROBLEM — N28.89 RENAL MASS: Status: RESOLVED | Noted: 2022-01-24 | Resolved: 2022-03-07

## 2022-03-07 PROBLEM — R05.9 COUGH: Status: RESOLVED | Noted: 2018-08-14 | Resolved: 2022-03-07

## 2022-03-07 PROCEDURE — 99213 OFFICE O/P EST LOW 20 MIN: CPT | Performed by: INTERNAL MEDICINE

## 2022-03-07 NOTE — PROGRESS NOTES
Subjective   Tobias Newberry is a 56 y.o. male.     Chief Complaint   Patient presents with   • Follow-up     Hospital; admitted 2/16/2022, discharged 2/18/2022, left kidney removed due to cancer       History of Present Illness   Patient here for hospital follow-up recent had kidney cancer left adrenal tumor. Status post removed the three weeks ago. Patient denies any symptoms now no pain. Blood pressure elevated. Also white blood cell elevated creatinine elevated.    Current Outpatient Medications:   •  amLODIPine (NORVASC) 2.5 MG tablet, Take 1 tablet by mouth Daily., Disp: 30 tablet, Rfl: 1    The following portions of the patient's history were reviewed and updated as appropriate: allergies, current medications, past family history, past medical history, past social history, past surgical history and problem list.    Review of Systems   Constitutional: Negative.    Respiratory: Negative.    Cardiovascular: Negative.    Gastrointestinal: Negative.    Musculoskeletal: Negative.    Skin: Negative.    Neurological: Negative.    Psychiatric/Behavioral: Negative.        Objective   Physical Exam  Cardiovascular:      Rate and Rhythm: Normal rate and regular rhythm.      Heart sounds: Normal heart sounds.   Pulmonary:      Effort: Pulmonary effort is normal.      Breath sounds: Normal breath sounds.   Abdominal:      General: Bowel sounds are normal.   Musculoskeletal:      Cervical back: Neck supple.   Skin:     General: Skin is warm.   Neurological:      Mental Status: He is alert and oriented to person, place, and time.         All tests have been reviewed.    Assessment/Plan   There are no diagnoses linked to this encounter.          left radical nephrectomy, thrombectomy, and adrenalectomy.   HTN watch for now  Leukocytosis repeat   Cr elevation repeat    3 mo PE

## 2022-03-09 ENCOUNTER — READMISSION MANAGEMENT (OUTPATIENT)
Dept: CALL CENTER | Facility: HOSPITAL | Age: 57
End: 2022-03-09

## 2022-03-09 NOTE — OUTREACH NOTE
"General Surgery Week 3 Survey    Flowsheet Row Responses   [facility] patient discharged from? [facility]   Does the patient have one of the following disease processes/diagnoses(primary or secondary)? General Surgery   Week 3 attempt successful? Yes   Call start time 1259   Call end time 1301   Discharge diagnosis Open left radial nephrectomy adrenalectomy   Meds reviewed with patient/caregiver? Yes   Is the patient taking all medications as directed (includes completed medication regime)? Yes   Has the patient kept scheduled appointments due by today? Yes   What is the patient's perception of their health status since discharge? Improving   Is the patient/caregiver able to teach back steps to recovery at home? Rest and rebuild strength, gradually increase activity, Eat a well-balance diet   Week 3 call completed? Yes   Revoked No further contact(revokes)-requires comment   Is the patient interested in additional calls from an ambulatory ?  NOTE:  applies to high risk patients requiring additional follow-up. No   Graduated/Revoked comments Pt will return to work on 3/14/22. Pt reports, \"everything is normal now\"          WONG RAMIREZ - Registered Nurse  "

## 2022-03-16 ENCOUNTER — HOSPITAL ENCOUNTER (OUTPATIENT)
Dept: ONCOLOGY | Facility: HOSPITAL | Age: 57
Setting detail: INFUSION SERIES
Discharge: HOME | End: 2022-03-16

## 2022-03-16 ENCOUNTER — OFFICE VISIT (OUTPATIENT)
Dept: ONCOLOGY | Facility: CLINIC | Age: 57
End: 2022-03-16

## 2022-03-16 ENCOUNTER — HOSPITAL ENCOUNTER (OUTPATIENT)
Dept: ONCOLOGY | Facility: HOSPITAL | Age: 57
Discharge: HOME | End: 2022-03-16

## 2022-03-16 ENCOUNTER — EDUCATION (OUTPATIENT)
Dept: ONCOLOGY | Facility: HOSPITAL | Age: 57
End: 2022-03-16

## 2022-03-16 VITALS
SYSTOLIC BLOOD PRESSURE: 147 MMHG | DIASTOLIC BLOOD PRESSURE: 88 MMHG | HEIGHT: 71 IN | OXYGEN SATURATION: 97 % | RESPIRATION RATE: 18 BRPM | BODY MASS INDEX: 29.12 KG/M2 | TEMPERATURE: 96.8 F | HEART RATE: 82 BPM | WEIGHT: 208 LBS

## 2022-03-16 DIAGNOSIS — C64.2 CARCINOMA OF LEFT KIDNEY: ICD-10-CM

## 2022-03-16 DIAGNOSIS — C64.2 CARCINOMA OF LEFT KIDNEY: Primary | ICD-10-CM

## 2022-03-16 LAB
ALBUMIN SERPL-MCNC: 4.8 G/DL (ref 3.5–5.2)
ALBUMIN/GLOB SERPL: 1.7 G/DL
ALP SERPL-CCNC: 92 U/L (ref 39–117)
ALT SERPL W P-5'-P-CCNC: 15 U/L (ref 1–41)
ANION GAP SERPL CALCULATED.3IONS-SCNC: 13 MMOL/L (ref 5–15)
AST SERPL-CCNC: 13 U/L (ref 1–40)
BILIRUB SERPL-MCNC: 0.3 MG/DL (ref 0–1.2)
BUN SERPL-MCNC: 23 MG/DL (ref 6–20)
BUN/CREAT SERPL: 15.5 (ref 7–25)
CALCIUM SPEC-SCNC: 10.3 MG/DL (ref 8.6–10.5)
CHLORIDE SERPL-SCNC: 99 MMOL/L (ref 98–107)
CO2 SERPL-SCNC: 28 MMOL/L (ref 22–29)
CREAT SERPL-MCNC: 1.48 MG/DL (ref 0.76–1.27)
EGFRCR SERPLBLD CKD-EPI 2021: 55.2 ML/MIN/1.73
ERYTHROCYTE [DISTWIDTH] IN BLOOD BY AUTOMATED COUNT: 12.8 % (ref 12.3–15.4)
GLOBULIN UR ELPH-MCNC: 2.9 GM/DL
GLUCOSE SERPL-MCNC: 96 MG/DL (ref 65–99)
HCT VFR BLD AUTO: 38.7 % (ref 37.5–51)
HGB BLD-MCNC: 12.7 G/DL (ref 13–17.7)
LYMPHOCYTES # BLD AUTO: 2 10*3/MM3 (ref 0.7–3.1)
LYMPHOCYTES NFR BLD AUTO: 23.2 % (ref 19.6–45.3)
MCH RBC QN AUTO: 28.8 PG (ref 26.6–33)
MCHC RBC AUTO-ENTMCNC: 32.9 G/DL (ref 31.5–35.7)
MCV RBC AUTO: 87.4 FL (ref 79–97)
MONOCYTES # BLD AUTO: 0.3 10*3/MM3 (ref 0.1–0.9)
MONOCYTES NFR BLD AUTO: 3.1 % (ref 5–12)
NEUTROPHILS NFR BLD AUTO: 6.3 10*3/MM3 (ref 1.7–7)
NEUTROPHILS NFR BLD AUTO: 73.7 % (ref 42.7–76)
PLATELET # BLD AUTO: 396 10*3/MM3 (ref 140–450)
PMV BLD AUTO: 6.8 FL (ref 6–12)
POTASSIUM SERPL-SCNC: 4.4 MMOL/L (ref 3.5–5.2)
PROT SERPL-MCNC: 7.7 G/DL (ref 6–8.5)
RBC # BLD AUTO: 4.43 10*6/MM3 (ref 4.14–5.8)
SODIUM SERPL-SCNC: 140 MMOL/L (ref 136–145)
T4 FREE SERPL-MCNC: 1.49 NG/DL (ref 0.93–1.7)
TSH SERPL DL<=0.05 MIU/L-ACNC: 0.8 UIU/ML (ref 0.27–4.2)
WBC NRBC COR # BLD: 8.5 10*3/MM3 (ref 3.4–10.8)

## 2022-03-16 PROCEDURE — 84439 ASSAY OF FREE THYROXINE: CPT | Performed by: INTERNAL MEDICINE

## 2022-03-16 PROCEDURE — 36415 COLL VENOUS BLD VENIPUNCTURE: CPT

## 2022-03-16 PROCEDURE — 80050 GENERAL HEALTH PANEL: CPT | Performed by: INTERNAL MEDICINE

## 2022-03-16 PROCEDURE — 99214 OFFICE O/P EST MOD 30 MIN: CPT | Performed by: NURSE PRACTITIONER

## 2022-03-16 RX ORDER — ONDANSETRON HYDROCHLORIDE 8 MG/1
8 TABLET, FILM COATED ORAL 3 TIMES DAILY PRN
Qty: 30 TABLET | Refills: 5 | Status: SHIPPED | OUTPATIENT
Start: 2022-03-16 | End: 2022-07-20

## 2022-03-16 NOTE — PROGRESS NOTES
"CHEMOTHERAPY PREPARATION    Tobias Newberry  [id number]  1965    Subjective   Chief Complaint: Treatment Preparation and Needs Assessment    History of present illness:  Tobias Newberry is a 56 y.o. year old male who is here today for chemotherapy preparation and needs assessment. The patient has been diagnosed with stage III clear cell carcinoma of the left kidney and is scheduled to begin treatment with Keytruda.     Oncology History:    Oncology/Hematology History   Carcinoma of left kidney (HCC)   1/18/2022 Initial Diagnosis    Carcinoma of left kidney (HCC)     2/28/2022 Cancer Staged    Staging form: Kidney, AJCC 8th Edition  - Clinical stage from 2/28/2022: Stage III (cT3a, cN0, cM0) - Signed by Garrett Pizano MD on 2/28/2022     3/18/2022 -  Chemotherapy    OP Kidney  Pembrolizumab 200 mg Every 21 Days         The current medication list and allergy list were reviewed and reconciled.     Past Medical History, Past Surgical History, Social History, Family History have been reviewed and are without significant changes except as mentioned.    Review of Systems   Constitutional: Positive for fatigue.   Skin:        Incisional tenderness- improving   All other systems reviewed and are negative.      Objective   Physical Exam  Vital Signs: /88   Pulse 82   Temp 96.8 °F (36 °C) (Temporal)   Resp 18   Ht 180.3 cm (70.98\")   Wt 94.3 kg (208 lb)   SpO2 97%   BMI 29.02 kg/m²   Vitals:    03/16/22 1356   PainSc: 0-No pain           General Appearance:  alert, cooperative, no apparent distress and appears stated age   Neurologic/Psychiatric: A&O x 3, gait steady, appropriate affect   HEENT:  Normocephalic, without obvious abnormality, mucous membranes moist   Lungs:   Clear to auscultation bilaterally; respirations regular, even, and unlabored bilaterally   Heart:  Regular rate and rhythm, no murmurs appreciated   Extremities: Normal, atraumatic; no clubbing, cyanosis, or edema    Skin: No " rashes, lesions, or abnormal coloration noted     ECOG Performance Status: 0 - Asymptomatic            NEEDS ASSESSMENTS    Genetics  The patient's new diagnosis and family history have been reviewed for genetic counseling needs. A genetic referral is recommended. He has a strong family history of kidney cancer in his mother and maternal aunt. He has been referred to genetics counselors for evaluation for germ line mutations.     Molecular Testing  Further molecular testing on tumor is not indicated.     Psychosocial  The patient has completed a PHQ-9 Depression Screening and the Distress Thermometer (DT) today.   PHQ-9 Total Score:  . PHQ-9 results show 1-4 (Minimal Depression). The patient scored their distress today as 0 on a scale of 0-10 with 0 being no distress and 10 being extreme distress.   Problems marked by the patient as being an issue for them within the last week include no problems.   Results were reviewed along with psychosocial resources offered by our cancer center. Our oncology social worker will be flagged for a DT score of 4 or above, and a same day call will be made for a score of 9 or 10. A mental health referral is not recommended at this time. The patient is not accepting of a referral to [facility].   Copies of patient's questionnaires will be scanned into EMR for details and further reference.    Barriers to care  A barriers form was also completed by the patient today. We discussed services offered by our facility to help him have adequate access to care. The patient was given the name and card for our Oncology Social Worker, Selena Alexis. Based upon barriers assessment today, the patient will not require a follow-up call from the  to further discuss needs.   A copy of the barriers form will also be scanned into EMR for details and further reference.     VAD Assessment  The patient and I discussed planned intervenous chemotherapy as well as other IV treatments that  "are often needed throughout the course of treatment. These may include, but are not limited to blood transfusions, antibiotics, and IV hydration. The vasculature does appear to be adequate for multiple peripheral IVs throughout their treatment course. Discussed risks and benefits of VADs. The patient would not like to pursue Port-A-Cath insertion prior to initiation of treatment.     Advance Care Planning   ACP discussion was held with the patient during this visit. Patient does not have an advance directive, declines further assistance.  The patient and I discussed advanced care planning, \"Conversations that Matter\".   This service was offered, free of charge, for development of advance directives with a certified ACP facilitator.  The patient does not have an up-to-date advanced directive. This document is not on file with our office. The patient is not interested in an appointment with one of our facilitators to create or update their advanced directives.         Palliative Care  The patient and I discussed palliative care services. Palliative care is not the same as Hospice care. This is specialized medical care for people living with serious illness with the goal of improving quality of life for the patient and their family. Stefanie has partnered with [facility] to offer our patients outpatient palliative care early along with their treatment to assist in coordination of care, symptom management, pain management, and medical decision making.  Oncology criteria for palliative care referral is not met at this time. The patient is not interested in a palliative care consultation.     Additional Referral needs  none      CHEMOTHERAPY EDUCATION    Booklets Given: Chemotherapy and You [x]  Eating Hints [x]    Sexuality/Fertility Books []      Chemotherapy/Biotherapy Education Sheets: (list all that apply)  nausea management, diarrhea management, Cancer resourse contacts information, skin and mouth care " and Treatment Calendar                                                                                                                                                                 Chemotherapy Regimen:   Treatment Plans     Name Type Plan Dates Plan Provider         Active    OP Kidney  Pembrolizumab 200 mg Every 21 Days ONCOLOGY TREATMENT  3/16/2022 - Present Garrett Pizano MD                    DETAILED CHEMOTHERAPY TEACHING COMPLETED BY PHARMACY. CHEMOTHERAPY CONSENT COMPLETED BY PHARMACY. SEE PHARMACY EDUCATION FOR DOCUMENTATION.     Chemotherapy education comprehension reviewed. Questions answered and additional information discussed on topics including:  Anemia, Thrombocytopenia, Neutropenia, Nutrition and appetite changes, Diarrhea, Nausea & vomiting, Skin & nail changes and Organ toxicities        Assessment and Plan:    Diagnoses and all orders for this visit:    1. Carcinoma of left kidney (HCC) (Primary)          I spent 25 minutes caring for Tobias on this date of service. This time includes time spent by me in the following activities: preparing for the visit, performing a medically appropriate examination and/or evaluation, counseling and educating the patient/family/caregiver, documenting information in the medical record and care coordination.     The patient and I have reviewed their new cancer diagnosis and scheduled treatment plan. Needs assessment was completed including genetics, psychosocial needs, barriers to care, VAD evaluation, advanced care planning, and palliative care services. Referrals have been ordered as appropriate based upon our evaluation and patient desires.     Chemotherapy teaching was also completed today as documented above. Adequate time was given to answer all questions to his satisfaction. Patient and family are aware of their care team members and contact information if they have questions or problems throughout the treatment course. Needs assessments and education  has been completed. The patient is adequately prepared to begin treatment as scheduled.     Pain assessment was performed today as a part of patient’s care. For patients with pain related to surgery, gynecologic malignancy or cancer treatment, the plan is as noted in the assessment/plan.  For patients with pain not related to these issues, they are to seek any further needed care from a more appropriate provider, such as PCP.    The patient is having pre-treatment labs drawn today in preparation for treatment on Friday.     We reviewed use of Zofran every 8 hours as needed for treatment induced nausea and vomiting.     The patient is scheduled for CT of chest and follow up with Dr. Neff on 4/10/2022 to further evaluate the lung nodule incidentally noted on CT scan done 1/19/2022.    The patient has been referred to genetics counselor due to strong family history of kidney cancer in his mother and maternal aunt.      We will see the patient back Friday and then every 3 weeks with treatment.     Electronically signed by JAMI Reeder on 03/16/22 at 14:52 EDT.

## 2022-03-16 NOTE — TREATMENT PLAN
Outpatient Infusion • [address] • [address] • [place] [address] • [phone]      CHEMOTHERAPY EDUCATION    NAME:  Tobias Newberry      : 1965           DATE: 22    Medication Education Sheets:   Pembrolizumab    Other Education Sheets:   Immune Checkpoint Inhibitors, symptom tracking calendar and lizzette    Chemotherapy Regimen:   OP Kidney  Pembrolizumab 200 mg Every 21 Days  every 21 days      TOPICS EDUCATION PROVIDED COMMENTS   DIARRHEA:  causes, s/s of dehydration, ways to manage, dietary changes, when to call MD [x] Immunotherapy - Discussed risk of diarrhea and immune related colitis. Instructed patient to call MD if 4-6 episodes in 24 hours and discussed role of high dose steroids for the treatment of immune related colitis.    NAUSEA & VOMITING:  cause, use of antiemetics, dietary changes, when to call MD [x] Emetic risk: Minimal  Premeds: none   At home meds: Ondansetron  Pharmacy PRN meds sent to: [facility] in [place], [place]  Instructed the patient to take a dose of the PRN medication at the first onset of nausea and if it's not working to call us for additional medications.  Also provided non-drug measures to mitigate nausea.   INFERTILITY & SEXUALITY:    causes, fertility preservation options, sexuality changes, ways to manage, importance of birth control [x] Discussed safe sex practices.   ORGAN TOXICITIES:  cause, s/s, need for diagnostic tests, labs, when to notify MD [x] Discussed potential effects on organ systems, monitoring, diagnostic tests, labs, and when to notify their MD. Discussed the signs/symptoms of the following: hepatotoxicity, nephrotoxicity, cardiotoxicity, hormone gland changes, skin changes and lung changes, brain/eyes, and muscle/nerves.    HOME CARE:  use of spill kits, storing of PO chemo, how to manage bodily fluids [x] IV - Counseled on management of soiled linens and proper flush technique.  Discussed how to manage all the side effects at home and  advised when to contact the MD office   MISCELLANEOUS:  drug interactions, administration, labs, etc. [x] Discussed chemotherapy schedule, lab draws, infusion times, and total expected visit time.   DDIs: No significant DDIs  Lab draws: Day 1 of each 21 day cycle.   Educated the patient on the importance of tracking symptoms so that side effects can be treated early with steroids, when needed.      Medications:  Prior to Admission medications    Medication Sig Start Date End Date Taking? Authorizing Provider   amLODIPine (NORVASC) 2.5 MG tablet Take 1 tablet by mouth Daily. 1/18/22   Nabeel Bullock MD         Notes: All questions and concerns were addressed. Provided a personalized treatment calendar to patient (includes treatment and lab schedule). Provided patient with contact information for the pharmacist and clinic while instructing them to call if any questions or concerns arise. Informed consent for treatment was obtained. Patient was receptive to information and expressed understanding.       Chula Dallas, Pharmacy Intern    3/16/2022 13:11 EDT

## 2022-03-17 ENCOUNTER — DOCUMENTATION (OUTPATIENT)
Dept: NUTRITION | Facility: HOSPITAL | Age: 57
End: 2022-03-17

## 2022-03-17 NOTE — PROGRESS NOTES
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    Enteral Feeding       Anthropometric Measurements     Height:    Ht Readings from Last 1 Encounters:   22 180.3 cm (70.98\")       Weight:    Wt Readings from Last 1 Encounters:   22 94.3 kg (208 lb)       BMI: 29.02 / overweight    Weight Change: 7 lbs weight loss since 2022 (3%)    Review of Lab Data (Time Frame - 1 month / 2 month)     Reviewed 3/17/22 - HGB 12.7 g/dl noted  Medication Review     Reviewed 3/17/22  Nutrition Focused Physical Findings       Nutrition Impact Symptoms       Physical Activity      Not my normal self, but able to be up and about with fairly normal activities    Current Nutritional Intake     Oral diet:  Regular    Malnutrition Risk Assessment     Recent weight loss over the past 6 months:  Yes    How much weight loss:  1 = 2-13 lbs / intentional     Eating poorly because of a decreased appetite:  0 = No    Malnutrition Screening Score:     MST = 0 / " Patient is not considered at risk for malnutrition    Nutrition Diagnosis     Problem    Etiology    Signs / Symptoms      Nutrition Intervention   Consultation with patient during initial cycle of Keytruda.  Patient states that he retains a good appetite and is eating well post surgery; he lost approximately 7 lbs at time of surgery.  He shares that for the past 2 years, he and his wife have implemented changes into their diets to limit excessive CHO intake, limit portion sizes and focus on inclusion of healthy foods choices to promote weight loss and improve health.  He states that he has attained a 30 + lbs weight loss and has managed to maintain a stable weight @ 200-210 lbs.  They gave up fast foods / convenience type foods to maintain their healthy eating lifestyle.  Patient encouraged to continue the eating principles they had already implemented, with emphasis on higher protein foods (goal 100-120 grams per day) and encouragement to have smaller, more frequent meals / snacks throughout the day with inclusion of protein at each.  Discussed ONS with suggestion to have one in the morning, as he only eats 2 meals per day, with the first meal being a light lunch.    Reviewed the possible side effects of fatigue and nausea, with tips for management.   Reinforced hydration goal of 100+ ounces per day.  Goal       Monitoring / Evaluation     Will follow as indicated

## 2022-03-18 ENCOUNTER — OFFICE VISIT (OUTPATIENT)
Dept: ONCOLOGY | Facility: CLINIC | Age: 57
End: 2022-03-18

## 2022-03-18 ENCOUNTER — HOSPITAL ENCOUNTER (OUTPATIENT)
Dept: ONCOLOGY | Facility: HOSPITAL | Age: 57
Setting detail: INFUSION SERIES
Discharge: HOME | End: 2022-03-18

## 2022-03-18 VITALS
HEART RATE: 81 BPM | BODY MASS INDEX: 28.98 KG/M2 | OXYGEN SATURATION: 99 % | DIASTOLIC BLOOD PRESSURE: 91 MMHG | TEMPERATURE: 97.5 F | WEIGHT: 207 LBS | SYSTOLIC BLOOD PRESSURE: 160 MMHG | RESPIRATION RATE: 18 BRPM | HEIGHT: 71 IN

## 2022-03-18 DIAGNOSIS — C64.2 CARCINOMA OF LEFT KIDNEY: Primary | ICD-10-CM

## 2022-03-18 PROCEDURE — 96413 CHEMO IV INFUSION 1 HR: CPT

## 2022-03-18 PROCEDURE — 99214 OFFICE O/P EST MOD 30 MIN: CPT | Performed by: INTERNAL MEDICINE

## 2022-03-18 PROCEDURE — 25010000002 PEMBROLIZUMAB 100 MG/4ML SOLUTION 4 ML VIAL: Performed by: INTERNAL MEDICINE

## 2022-03-18 RX ORDER — SODIUM CHLORIDE 9 MG/ML
250 INJECTION, SOLUTION INTRAVENOUS ONCE
Status: DISCONTINUED | OUTPATIENT
Start: 2022-03-18 | End: 2022-03-19 | Stop reason: HOSPADM

## 2022-03-18 RX ADMIN — SODIUM CHLORIDE 200 MG: 9 INJECTION, SOLUTION INTRAVENOUS at 10:51

## 2022-03-18 NOTE — PROGRESS NOTES
DATE OF VISIT: 3/18/2022    REASON FOR VISIT: Followup for left kidney cancer     PROBLEM LIST:  1. 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  Presented with hematuria and back pain  B.  CT abdomen pelvis done January 16, 2022 revealed 6 cm left kidney mass  C.  Status post radical nephrectomy done by Dr. Obrien February 16, 2022  D.  Final pathology confirmed 6 cm, grade 2 clear cell carcinoma, invades renal vein, clear surgical margins, and no lymphovascular invasion.  2.  Hypertension  3.  Postoperative pain  4.  Right upper lobe lung nodule, 1 cm:  A.  Incidentally noted on CT chest done January 20, 2022  5.  Family history of kidney cancer    HISTORY OF PRESENT ILLNESS: The patient is a very pleasant 56 y.o. male  with past medical history significant for left kidney cancer diagnosed February 2022.  He is status post radical left nephrectomy.  He was started on adjuvant Keytruda March 18, 2022.  The  patient is here today for scheduled follow-up visit with treatment cycle #1.    SUBJECTIVE: The patient has been doing fairly well. he is here today by himself.  He is ready to get started on treatment. he denied any fever or  chills, no night sweats, denied any headaches    Past History:  Medical History: has a past medical history of Anxiety, Cancer (HCC), Depression, Ross catheter in place (01/31/2022), Hematuria, Hypertension, PONV (postoperative nausea and vomiting), Tattoo, Tinnitus, and Wears glasses.   Surgical History: has a past surgical history that includes Vasectomy and Nephrectomy (Left, 2/16/2022).   Family History: family history includes Cancer in his brother and mother; Heart disease in his father and paternal uncle; Leukemia in his maternal aunt; Skin cancer in his maternal grandfather.   Social History: reports that he quit smoking about 6 years ago. He has a 6.00 pack-year smoking history. His smokeless tobacco use includes chew. He reports current alcohol use. He reports that he  "does not use drugs.    (Not in a hospital admission)     Allergies: Patient has no known allergies.     Review of Systems      Current Outpatient Medications:   •  amLODIPine (NORVASC) 2.5 MG tablet, Take 1 tablet by mouth Daily., Disp: 30 tablet, Rfl: 1  •  ondansetron (ZOFRAN) 8 MG tablet, Take 1 tablet by mouth 3 (Three) Times a Day As Needed for Nausea or Vomiting., Disp: 30 tablet, Rfl: 5    PHYSICAL EXAMINATION:   /91   Pulse 81   Temp 97.5 °F (36.4 °C) (Temporal)   Resp 18   Ht 180.3 cm (70.98\")   Wt 93.9 kg (207 lb)   SpO2 99%   BMI 28.88 kg/m²    Pain Score    03/18/22 0922   PainSc: 0-No pain        ECOG score: 0            ECOG Performance Status: 1 - Symptomatic but completely ambulatory  General Appearance:  alert, cooperative, no apparent distress and appears stated age   Neurologic/Psychiatric: A&O x 3, gait steady, appropriate affect, strength 5/5 in all muscle groups   HEENT:  Normocephalic, without obvious abnormality, mucous membranes moist   Neck: Supple, symmetrical, trachea midline, no adenopathy;  No thyromegaly, masses, or tenderness   Lungs:   Clear to auscultation bilaterally; respirations regular, even, and unlabored bilaterally   Heart:  Regular rate and rhythm, no murmurs appreciated   Abdomen:   Soft, non-tender, non-distended and no organomegaly   Lymph nodes: No cervical, supraclavicular, inguinal or axillary adenopathy noted   Extremities: Normal, atraumatic; no clubbing, cyanosis, or edema    Skin: No rashes, ulcers, or suspicious lesions noted     Hospital Outpatient Visit on 03/16/2022   Component Date Value Ref Range Status   • Glucose 03/16/2022 96  65 - 99 mg/dL Final   • BUN 03/16/2022 23 (A) 6 - 20 mg/dL Final   • Creatinine 03/16/2022 1.48 (A) 0.76 - 1.27 mg/dL Final   • Sodium 03/16/2022 140  136 - 145 mmol/L Final   • Potassium 03/16/2022 4.4  3.5 - 5.2 mmol/L Final   • Chloride 03/16/2022 99  98 - 107 mmol/L Final   • CO2 03/16/2022 28.0  22.0 - 29.0 mmol/L " Final   • Calcium 03/16/2022 10.3  8.6 - 10.5 mg/dL Final   • Total Protein 03/16/2022 7.7  6.0 - 8.5 g/dL Final   • Albumin 03/16/2022 4.80  3.50 - 5.20 g/dL Final   • ALT (SGPT) 03/16/2022 15  1 - 41 U/L Final   • AST (SGOT) 03/16/2022 13  1 - 40 U/L Final   • Alkaline Phosphatase 03/16/2022 92  39 - 117 U/L Final   • Total Bilirubin 03/16/2022 0.3  0.0 - 1.2 mg/dL Final   • Globulin 03/16/2022 2.9  gm/dL Final    Calculated Result   • A/G Ratio 03/16/2022 1.7  g/dL Final   • BUN/Creatinine Ratio 03/16/2022 15.5  7.0 - 25.0 Final   • Anion Gap 03/16/2022 13.0  5.0 - 15.0 mmol/L Final   • eGFR 03/16/2022 55.2 (A) >60.0 mL/min/1.73 Final    National Kidney Foundation and American Society of Nephrology (ASN) Task Force recommended calculation based on the Chronic Kidney Disease Epidemiology Collaboration (CKD-EPI) equation refit without adjustment for race.   • TSH 03/16/2022 0.800  0.270 - 4.200 uIU/mL Final   • Free T4 03/16/2022 1.49  0.93 - 1.70 ng/dL Final   • WBC 03/16/2022 8.50  3.40 - 10.80 10*3/mm3 Final   • RBC 03/16/2022 4.43  4.14 - 5.80 10*6/mm3 Final   • Hemoglobin 03/16/2022 12.7 (A) 13.0 - 17.7 g/dL Final   • Hematocrit 03/16/2022 38.7  37.5 - 51.0 % Final   • RDW 03/16/2022 12.8  12.3 - 15.4 % Final   • MCV 03/16/2022 87.4  79.0 - 97.0 fL Final   • MCH 03/16/2022 28.8  26.6 - 33.0 pg Final   • MCHC 03/16/2022 32.9  31.5 - 35.7 g/dL Final   • MPV 03/16/2022 6.8  6.0 - 12.0 fL Final   • Platelets 03/16/2022 396  140 - 450 10*3/mm3 Final   • Neutrophil % 03/16/2022 73.7  42.7 - 76.0 % Final   • Lymphocyte % 03/16/2022 23.2  19.6 - 45.3 % Final   • Monocyte % 03/16/2022 3.1 (A) 5.0 - 12.0 % Final   • Neutrophils, Absolute 03/16/2022 6.30  1.70 - 7.00 10*3/mm3 Final   • Lymphocytes, Absolute 03/16/2022 2.00  0.70 - 3.10 10*3/mm3 Final   • Monocytes, Absolute 03/16/2022 0.30  0.10 - 0.90 10*3/mm3 Final        No results found.    ASSESSMENT: The patient is a very pleasant 56 y.o. male  with left kidney  cancer      PLAN:    1.  Left kidney cancer:  A.  I will proceed with adjuvant Keytruda 20 mg IV every 3 weeks cycle #1 today.  B.  The patient will follow up with us in 3 weeks for cycle #2.  C.  We will plan to finish 2 years of treatment as tolerated.  D.  I will monitor the patient blood work including blood counts kidney function liver function and electrolytes.  E.  Today I did go over the blood the results with the patient from March 16, 2022.  He did have mild anemia which could be explained by his recent surgery.  He does have elevated creatinine at 1.4.  We will monitor both carefully since both can get worse while on treatment.  F. We discussed potential side effects of immunotherapy including but not limited to immune mediated reactions with thyroiditis, pneumonitis, hepatitis, colitis, rash, and electrolyte abnormalities, fatigue, multiorgan failure, and possibly death.  G.  The patient scheduled for follow-up scans in May ordered by urology.  I will follow up on the results.    2.  Right upper lobe lung nodule:  A.  The patient is scheduled for follow-up CT scan with Dr. Neff next month.    3.  Treatment induced nausea:  A.  I will start the patient on Zofran as needed.    4.  Hypertension:  A.  I will continue Norvasc 2.5 mg daily.  B.  We will monitor the patient blood pressure while on treatment.  He would be at risk for hypotension as well as hypertension.    FOLLOW UP: 3 weeks with next cycle.    Garrett Pizano MD  3/18/2022

## 2022-03-21 DIAGNOSIS — I10 PRIMARY HYPERTENSION: ICD-10-CM

## 2022-03-21 RX ORDER — AMLODIPINE BESYLATE 2.5 MG/1
TABLET ORAL
Qty: 90 TABLET | Refills: 3 | Status: SHIPPED | OUTPATIENT
Start: 2022-03-21 | End: 2022-06-07 | Stop reason: SDUPTHER

## 2022-03-21 NOTE — TELEPHONE ENCOUNTER
Rx Refill Note  Requested Prescriptions     Pending Prescriptions Disp Refills   • amLODIPine (NORVASC) 2.5 MG tablet [Pharmacy Med Name: amLODIPine Besylate Oral Tablet 2.5 MG] 90 tablet 3     Sig: TAKE 1 TABLET BY MOUTH EVERY DAY      Last office visit with prescribing clinician: 3/7/2022      Next office visit with prescribing clinician: 6/7/2022      {TIP  Please add Last Relevant Lab Date if appropriate:23}     KIMMIE MUÑOZ MA  03/21/22, 10:32 EDT

## 2022-04-05 ENCOUNTER — TELEPHONE (OUTPATIENT)
Dept: ONCOLOGY | Facility: HOSPITAL | Age: 57
End: 2022-04-05

## 2022-04-05 NOTE — TELEPHONE ENCOUNTER
Left message for patient to return my call to complete pre-visit questionnaire for upcoming appointment.

## 2022-04-07 ENCOUNTER — LAB (OUTPATIENT)
Dept: LAB | Facility: HOSPITAL | Age: 57
End: 2022-04-07

## 2022-04-07 ENCOUNTER — CLINICAL SUPPORT (OUTPATIENT)
Dept: GENETICS | Facility: HOSPITAL | Age: 57
End: 2022-04-07

## 2022-04-07 DIAGNOSIS — Z80.51 FAMILY HISTORY OF KIDNEY CANCER: ICD-10-CM

## 2022-04-07 DIAGNOSIS — Z13.79 GENETIC TESTING: Primary | ICD-10-CM

## 2022-04-07 DIAGNOSIS — C64.2 CARCINOMA OF LEFT KIDNEY: ICD-10-CM

## 2022-04-07 DIAGNOSIS — C64.2 MALIGNANT NEOPLASM OF LEFT KIDNEY: ICD-10-CM

## 2022-04-07 LAB
ALBUMIN SERPL-MCNC: 4.3 G/DL (ref 3.5–5.2)
ALBUMIN/GLOB SERPL: 1.5 G/DL
ALP SERPL-CCNC: 86 U/L (ref 39–117)
ALT SERPL W P-5'-P-CCNC: 12 U/L (ref 1–41)
ANION GAP SERPL CALCULATED.3IONS-SCNC: 9 MMOL/L (ref 5–15)
AST SERPL-CCNC: 14 U/L (ref 1–40)
BILIRUB SERPL-MCNC: 0.3 MG/DL (ref 0–1.2)
BUN SERPL-MCNC: 26 MG/DL (ref 6–20)
BUN/CREAT SERPL: 17.1 (ref 7–25)
CALCIUM SPEC-SCNC: 9.5 MG/DL (ref 8.6–10.5)
CHLORIDE SERPL-SCNC: 101 MMOL/L (ref 98–107)
CO2 SERPL-SCNC: 26 MMOL/L (ref 22–29)
CREAT SERPL-MCNC: 1.52 MG/DL (ref 0.76–1.27)
EGFRCR SERPLBLD CKD-EPI 2021: 53.4 ML/MIN/1.73
ERYTHROCYTE [DISTWIDTH] IN BLOOD BY AUTOMATED COUNT: 13.3 % (ref 12.3–15.4)
GLOBULIN UR ELPH-MCNC: 2.8 GM/DL
GLUCOSE SERPL-MCNC: 112 MG/DL (ref 65–99)
HCT VFR BLD AUTO: 37.8 % (ref 37.5–51)
HGB BLD-MCNC: 12 G/DL (ref 13–17.7)
LYMPHOCYTES # BLD AUTO: 1.4 10*3/MM3 (ref 0.7–3.1)
LYMPHOCYTES NFR BLD AUTO: 16.8 % (ref 19.6–45.3)
MCH RBC QN AUTO: 27.3 PG (ref 26.6–33)
MCHC RBC AUTO-ENTMCNC: 31.7 G/DL (ref 31.5–35.7)
MCV RBC AUTO: 86.2 FL (ref 79–97)
MONOCYTES # BLD AUTO: 0.6 10*3/MM3 (ref 0.1–0.9)
MONOCYTES NFR BLD AUTO: 7.2 % (ref 5–12)
NEUTROPHILS NFR BLD AUTO: 6.2 10*3/MM3 (ref 1.7–7)
NEUTROPHILS NFR BLD AUTO: 76 % (ref 42.7–76)
PLATELET # BLD AUTO: 326 10*3/MM3 (ref 140–450)
PMV BLD AUTO: 6.5 FL (ref 6–12)
POTASSIUM SERPL-SCNC: 4.7 MMOL/L (ref 3.5–5.2)
PROT SERPL-MCNC: 7.1 G/DL (ref 6–8.5)
RBC # BLD AUTO: 4.38 10*6/MM3 (ref 4.14–5.8)
SODIUM SERPL-SCNC: 136 MMOL/L (ref 136–145)
WBC NRBC COR # BLD: 8.2 10*3/MM3 (ref 3.4–10.8)

## 2022-04-07 PROCEDURE — 96040: CPT | Performed by: GENETIC COUNSELOR, MS

## 2022-04-07 PROCEDURE — 80053 COMPREHEN METABOLIC PANEL: CPT

## 2022-04-07 PROCEDURE — 85025 COMPLETE CBC W/AUTO DIFF WBC: CPT

## 2022-04-07 PROCEDURE — 36415 COLL VENOUS BLD VENIPUNCTURE: CPT

## 2022-04-08 ENCOUNTER — HOSPITAL ENCOUNTER (OUTPATIENT)
Dept: CT IMAGING | Facility: HOSPITAL | Age: 57
Discharge: HOME | End: 2022-04-08
Admitting: INTERNAL MEDICINE

## 2022-04-08 DIAGNOSIS — R91.1 LUNG NODULE, SOLITARY: ICD-10-CM

## 2022-04-08 DIAGNOSIS — R93.89 ABNORMAL CT OF THE CHEST: ICD-10-CM

## 2022-04-08 PROCEDURE — 71250 CT THORAX DX C-: CPT

## 2022-04-11 ENCOUNTER — HOSPITAL ENCOUNTER (OUTPATIENT)
Dept: ONCOLOGY | Facility: HOSPITAL | Age: 57
Setting detail: INFUSION SERIES
Discharge: HOME | End: 2022-04-11

## 2022-04-11 ENCOUNTER — OFFICE VISIT (OUTPATIENT)
Dept: ONCOLOGY | Facility: CLINIC | Age: 57
End: 2022-04-11

## 2022-04-11 ENCOUNTER — APPOINTMENT (OUTPATIENT)
Dept: ONCOLOGY | Facility: HOSPITAL | Age: 57
End: 2022-04-11

## 2022-04-11 VITALS
HEART RATE: 81 BPM | TEMPERATURE: 97.8 F | BODY MASS INDEX: 29.12 KG/M2 | DIASTOLIC BLOOD PRESSURE: 98 MMHG | SYSTOLIC BLOOD PRESSURE: 113 MMHG | OXYGEN SATURATION: 97 % | WEIGHT: 208 LBS | RESPIRATION RATE: 18 BRPM | HEIGHT: 71 IN

## 2022-04-11 DIAGNOSIS — C64.2 CARCINOMA OF LEFT KIDNEY: Primary | ICD-10-CM

## 2022-04-11 PROCEDURE — 25010000002 PEMBROLIZUMAB 100 MG/4ML SOLUTION 4 ML VIAL: Performed by: INTERNAL MEDICINE

## 2022-04-11 PROCEDURE — 96413 CHEMO IV INFUSION 1 HR: CPT

## 2022-04-11 PROCEDURE — 99214 OFFICE O/P EST MOD 30 MIN: CPT | Performed by: NURSE PRACTITIONER

## 2022-04-11 RX ORDER — SODIUM CHLORIDE 9 MG/ML
250 INJECTION, SOLUTION INTRAVENOUS ONCE
Status: COMPLETED | OUTPATIENT
Start: 2022-04-11 | End: 2022-04-11

## 2022-04-11 RX ADMIN — SODIUM CHLORIDE 200 MG: 9 INJECTION, SOLUTION INTRAVENOUS at 10:16

## 2022-04-11 RX ADMIN — SODIUM CHLORIDE 250 ML: 9 INJECTION, SOLUTION INTRAVENOUS at 10:15

## 2022-04-11 NOTE — PROGRESS NOTES
Tobias Newberry is a 56-year-old male who was seen for genetic counseling due to a personal and family history of kidney cancer. Mr. 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 He has never had a colonoscopy.  Mr. Newberry was interested in discussing his risk of a hereditary cancer syndrome. Based on Mr. Newberry’s personal history and family history, the CancerNext-Expanded panel through [facility] was ordered, which is a multigene panel evaluating 77 genes associated with an increased cancer risk. Results are expected in 2-3 weeks.      Pertinent Family History: (See attached pedigree)   Mother:  Kidney cancer, 52  Mat. Aunt:  Kidney cancer, 3  Brother:  Hodgkin Lymphoma, 16  Mat. Grandfather: Skin cancer    We do not have medical records regarding the family history.    Risk Assessment:  Mr. Newberry’s personal and family history of cancer raises the question of a hereditary cancer syndrome. Based on the combination of cancers, Von Hippel-Lindau syndrome was discussed in detail. NCCN guidelines recommend genetic testing when an individual has been diagnosed with kidney cancer and there is a family history of at least one first or second degree relative also diagnosed with kidney cancer. We discussed the option of testing a number of other cancer susceptibility genes through a multigene panel. This risk assessment is based on the history information provided at the time of the appointment and could change in the future should new information be obtained.    Genetic Counseling: (30 minutes) We reviewed the family history information in detail.  Cases of cancer follow three general patterns: sporadic, familial, and hereditary.  While most cancer is sporadic, some cases appear to occur in family clusters. Familial cases may be due to a combination of shared genes and environmental factors among family members.  In even fewer  families, the cancer is said to be inherited, and the genes responsible for the cancer are known.      Family histories typical of hereditary cancer syndromes usually include multiple first- and second-degree relatives diagnosed with cancer types that define a syndrome.  These cases tend to be diagnosed at younger-than-expected ages and can be bilateral or multifocal.  The cancer in these families follows an autosomal dominant inheritance pattern, which indicates the likely presence of a mutation in a cancer susceptibility gene.  Children and siblings of an individual believed to carry this mutation have a 50% chance of inheriting that mutation, thereby inheriting the increased risk to develop cancer.  These mutations can be passed down from the maternal or the paternal lineage.    Von Hippel-Lindau (VHL) syndrome is characterized by hemangioblastomas of the brain, spinal cord, and retina; renal cysts and clear cell renal carcinoma; pheochromocytoma, pancreatic cysts, and neuroendocrine tumors; endolymphatic sac tumors; and epididymal and broad ligament cysts. Cerebellar hemangioblastomas may be associated with headache, vomiting, gait disturbances, or ataxia. Spinal hemangioblastomas and related syrinx usually present with pain. Sensory and motor loss may develop with cord compression. Retinal hemangioblastomas may be the initial manifestation of VHL syndrome and can cause vision loss. Renal cell carcinoma occurs in about 70% of individuals with VHL. Pheochromocytomas can be asymptomatic but may cause sustained or episodic hypertension. Pancreatic lesions often remain asymptomatic and rarely cause endocrine or exocrine insufficiency. Endolymphatic sac tumors can cause hearing loss of varying severity, which can be a presenting symptom. Cystadenomas of the epididymis are relatively common.     We discussed that there are other hereditary cancer syndromes that can be tested on multigene panels. Some of these genes  have well defined cancer risks and established management guidelines.  Other genes that can be tested for have been more recently described, and there may be less data regarding the risks and therefore may not have established management guidelines.  We discussed these limitations at length. Based on Mr. Newberry’s personal history and his desire to get more information regarding his personal risks and potential risks for his family, he opted to pursue testing through a panel evaluating several other genes known to increase the risk for cancer.    Genetic Testing:  Due to Mr. Nebwerry’s personal and family history, he pursued a multigene panel which would look at multiple genes concurrently. The risks, benefits and limitations of multigene panel testing and implications for clinical management following testing were reviewed. DNA test results can influence decisions regarding screening, prevention and surgical management.  Genetic testing can have significant psychological implications for both individuals and families.  Also discussed was the possibility of employment and insurance discrimination based on genetic test results and the laws in place to prevent this (SISI).    The implications of a positive or negative test result were discussed.  We also discussed the possibility that, in some cases, genetic test results may be informative or may be ambiguous due to the identification of a genetic variant of uncertain significance (VUS).  These variants may or may not be associated with an increased cancer risk. The limited value of negative test results was emphasized, particularly given the significant population risk for cancer and the existence of other cancer susceptibility genes.  Given Mr. Newberry’s history, a negative test result would not eliminate all cancer risk to his family members, although the risk would not be as high as it would with positive genetic testing.      Plan:  Results are expected in 2-3 weeks,  and Mr. Newberry will be contacted by telephone at that time.  If Mr. Newberry has any questions or concerns in the meantime, we encourage him to contact us at [phone].      Lety Hernandez MS, [facility] [place]  Licensed Certified Genetic Counselor

## 2022-04-11 NOTE — PROGRESS NOTES
DATE OF VISIT: 4/11/2022    REASON FOR VISIT: Followup for left kidney cancer     PROBLEM LIST:  1. 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  Presented with hematuria and back pain  B.  CT abdomen pelvis done January 16, 2022 revealed 6 cm left kidney mass  C.  Status post radical nephrectomy done by Dr. Obrien February 16, 2022  D.  Final pathology confirmed 6 cm, grade 2 clear cell carcinoma, invades renal vein, clear surgical margins, and no lymphovascular invasion.  E. Started adjuvant immunotherapy 3/18/2022, status post 1 cycle of treatment.    2.  Hypertension  3.  Postoperative pain  4.  Right upper lobe lung nodule, 1 cm:  A.  Incidentally noted on CT chest done January 20, 2022  5.  Family history of kidney cancer  6. Immune-therapy induced pruritis    HISTORY OF PRESENT ILLNESS: The patient is a very pleasant 56 y.o. male  with past medical history significant for left kidney cancer diagnosed February 2022.  He is status post radical left nephrectomy.  He was started on adjuvant Keytruda March 18, 2022.  The patient is here today for scheduled follow-up visit with treatment cycle #2.    SUBJECTIVE: The patient is here today by himself. He has been doing well since his las visit. He tolerated his first cycle of Keytruda with no significant side effects.  He has some mild skin itching to his back without rash. He denies cough or shortness of breath. He had no diarrhea, nausea, or vomiting. He continues to work. He has follow up with Dr. Neff's office next Monday to review CT images due to right lung nodule.     Past History:  Medical History: has a past medical history of Anxiety, Cancer (HCC), Depression, Ross catheter in place (01/31/2022), Hematuria, Hypertension, PONV (postoperative nausea and vomiting), Tattoo, Tinnitus, and Wears glasses.   Surgical History: has a past surgical history that includes Vasectomy and Nephrectomy (Left, 2/16/2022).   Family History: family history includes  "Cancer in his brother and mother; Heart disease in his father and paternal uncle; Leukemia in his maternal aunt; Skin cancer in his maternal grandfather.   Social History: reports that he quit smoking about 6 years ago. He has a 6.00 pack-year smoking history. His smokeless tobacco use includes chew. He reports current alcohol use. He reports that he does not use drugs.    (Not in a hospital admission)     Allergies: Patient has no known allergies.     Review of Systems   Constitutional: Positive for fatigue.   Skin:        itching   All other systems reviewed and are negative.        Current Outpatient Medications:   •  amLODIPine (NORVASC) 2.5 MG tablet, TAKE 1 TABLET BY MOUTH EVERY DAY, Disp: 90 tablet, Rfl: 3  •  ondansetron (ZOFRAN) 8 MG tablet, Take 1 tablet by mouth 3 (Three) Times a Day As Needed for Nausea or Vomiting., Disp: 30 tablet, Rfl: 5    PHYSICAL EXAMINATION:   /98   Pulse 81   Temp 97.8 °F (36.6 °C) (Temporal)   Resp 18   Ht 180.3 cm (70.98\")   Wt 94.3 kg (208 lb)   SpO2 97%   BMI 29.02 kg/m²    Pain Score    04/11/22 0854   PainSc: 0-No pain        ECOG score: 0        ECOG Performance Status: 1 - Symptomatic but completely ambulatory  General Appearance:  alert, cooperative, no apparent distress and appears stated age   Neurologic/Psychiatric: A&O x 3, gait steady, appropriate affect, strength 5/5 in all muscle groups   HEENT:  Normocephalic, without obvious abnormality, mucous membranes moist   Neck: Supple, symmetrical, trachea midline, no adenopathy;  No thyromegaly, masses, or tenderness   Lungs:   Clear to auscultation bilaterally; respirations regular, even, and unlabored bilaterally   Heart:  Regular rate and rhythm, no murmurs appreciated   Abdomen:   Soft, non-tender, non-distended and no organomegaly   Lymph nodes: No cervical, supraclavicular, inguinal or axillary adenopathy noted   Extremities: Normal, atraumatic; no clubbing, cyanosis, or edema    Skin: No rashes, " ulcers, or suspicious lesions noted     Lab on 04/07/2022   Component Date Value Ref Range Status   • Glucose 04/07/2022 112 (A) 65 - 99 mg/dL Final   • BUN 04/07/2022 26 (A) 6 - 20 mg/dL Final   • Creatinine 04/07/2022 1.52 (A) 0.76 - 1.27 mg/dL Final   • Sodium 04/07/2022 136  136 - 145 mmol/L Final   • Potassium 04/07/2022 4.7  3.5 - 5.2 mmol/L Final   • Chloride 04/07/2022 101  98 - 107 mmol/L Final   • CO2 04/07/2022 26.0  22.0 - 29.0 mmol/L Final   • Calcium 04/07/2022 9.5  8.6 - 10.5 mg/dL Final   • Total Protein 04/07/2022 7.1  6.0 - 8.5 g/dL Final   • Albumin 04/07/2022 4.30  3.50 - 5.20 g/dL Final   • ALT (SGPT) 04/07/2022 12  1 - 41 U/L Final   • AST (SGOT) 04/07/2022 14  1 - 40 U/L Final   • Alkaline Phosphatase 04/07/2022 86  39 - 117 U/L Final   • Total Bilirubin 04/07/2022 0.3  0.0 - 1.2 mg/dL Final   • Globulin 04/07/2022 2.8  gm/dL Final    Calculated Result   • A/G Ratio 04/07/2022 1.5  g/dL Final   • BUN/Creatinine Ratio 04/07/2022 17.1  7.0 - 25.0 Final   • Anion Gap 04/07/2022 9.0  5.0 - 15.0 mmol/L Final   • eGFR 04/07/2022 53.4 (A) >60.0 mL/min/1.73 Final    National Kidney Foundation and American Society of Nephrology (ASN) Task Force recommended calculation based on the Chronic Kidney Disease Epidemiology Collaboration (CKD-EPI) equation refit without adjustment for race.   • WBC 04/07/2022 8.20  3.40 - 10.80 10*3/mm3 Final   • RBC 04/07/2022 4.38  4.14 - 5.80 10*6/mm3 Final   • Hemoglobin 04/07/2022 12.0 (A) 13.0 - 17.7 g/dL Final   • Hematocrit 04/07/2022 37.8  37.5 - 51.0 % Final   • RDW 04/07/2022 13.3  12.3 - 15.4 % Final   • MCV 04/07/2022 86.2  79.0 - 97.0 fL Final   • MCH 04/07/2022 27.3  26.6 - 33.0 pg Final   • MCHC 04/07/2022 31.7  31.5 - 35.7 g/dL Final   • MPV 04/07/2022 6.5  6.0 - 12.0 fL Final   • Platelets 04/07/2022 326  140 - 450 10*3/mm3 Final   • Neutrophil % 04/07/2022 76.0  42.7 - 76.0 % Final   • Lymphocyte % 04/07/2022 16.8 (A) 19.6 - 45.3 % Final   • Monocyte %  04/07/2022 7.2  5.0 - 12.0 % Final   • Neutrophils, Absolute 04/07/2022 6.20  1.70 - 7.00 10*3/mm3 Final   • Lymphocytes, Absolute 04/07/2022 1.40  0.70 - 3.10 10*3/mm3 Final   • Monocytes, Absolute 04/07/2022 0.60  0.10 - 0.90 10*3/mm3 Final        CT Chest Without Contrast Diagnostic    Result Date: 4/8/2022  Narrative: PROCEDURE: CT CHEST WO CONTRAST DIAGNOSTIC-  HISTORY: Lung nodule, > 8mm; R93.89-Abnormal findings on diagnostic imaging of other specified body structures; R91.1-Solitary pulmonary nodule  COMPARISON: 1/20/2022.  PROCEDURE:  Multiple axial CT images were obtained from the thoracic inlet through the upper abdomen without the use of contrast.  FINDINGS: Soft tissue windows reveal no axillary, hilar or mediastinal adenopathy. Heart size is normal. The aorta is normal in caliber. There are no pleural or pericardial effusions. Lung windows redemonstrate a 10 mm nodular opacity in the anterior right upper lobe on image 27 which is unchanged. No new or enlarging pulmonary nodules are identified. Within the visualized upper abdomen note is made of a cyst in the left lobe of the liver. The patient is status post left nephrectomy. The upper abdomen is otherwise unremarkable. Bone windows reveal no acute osseous abnormalities.      Impression: 10 mm nodular opacity in the anterior right upper lobe which is unchanged. A follow-up noncontrast CT of the chest in three months is recommended to ensure continued stability.  817.85 mGy.cm   This study was performed with techniques to keep radiation doses as low as reasonably achievable (ALARA). Individualized dose reduction techniques using automated exposure control or adjustment of mA and/or kV according to the patient size were employed.  This report was signed and finalized on 4/8/2022 8:43 AM by Bob Parker M.D..      ASSESSMENT: The patient is a very pleasant 56 y.o. male  with left kidney cancer      PLAN:    1.  Left kidney cancer:  A.  I will  proceed with adjuvant Keytruda 20 mg IV every 3 weeks cycle #2 today.  B.  The patient will follow up with us in 3 weeks for cycle #3. He would like to get back on a Friday schedule if possible.   C.  We will plan to finish 2 years of treatment as tolerated.  D.  I will monitor the patient blood work including blood counts, kidney function, liver functions, and electrolytes.  E.  I reviewed the lab results from 4/7/2022 with the patient. He has mild anemia with hemoglobin of 12.0, with normal platelet count and WBC. His creatinine was up some to 1.52. We will continue to monitor this closely as immunotherapy can cause nephritis. His electrolytes were within normal limits. He has mild hyperglycemia with glucose of 112, however he was not fasting.   F. We reviewed again the potential side effects of immunotherapy including but not limited to immune mediated reactions with thyroiditis, pneumonitis, hepatitis, colitis, rash, and electrolyte abnormalities, fatigue, multiorgan failure, and possibly death.  G.  The patient scheduled for follow-up scans in May ordered by urology.  I will follow up on the results.    2.  Right upper lobe lung nodule:  A.  I reviewed the CT results from 4/8/2022 with the patient. The right upper lobe lung nodule was stable in size at 10 mm.   B. He will follow up with Dr. Neff on Monday 4/18/2022.     3.  Treatment induced nausea:  A.  He will continue use of Zofran as needed for treatment induced nausea.    4.  Hypertension:  A.  I will continue Norvasc 2.5 mg daily.  B.  We will monitor the patient blood pressure while on treatment.  He would be at risk for hypotension as well as hypertension.    5. Immune-therapy induced pruritis:  A. I encouraged him to keep his skin well moisturized. He will monitor for rash or worsening symptoms. If his itching worsens we can consider antihistamines to help.        FOLLOW UP: 3 weeks with next cycle.    Yolanda Shaffer, APRN  4/11/2022

## 2022-04-18 ENCOUNTER — OFFICE VISIT (OUTPATIENT)
Dept: PULMONOLOGY | Facility: CLINIC | Age: 57
End: 2022-04-18

## 2022-04-18 VITALS
SYSTOLIC BLOOD PRESSURE: 158 MMHG | BODY MASS INDEX: 29.16 KG/M2 | RESPIRATION RATE: 12 BRPM | OXYGEN SATURATION: 99 % | DIASTOLIC BLOOD PRESSURE: 92 MMHG | HEART RATE: 75 BPM | TEMPERATURE: 97.1 F | WEIGHT: 209 LBS

## 2022-04-18 DIAGNOSIS — R93.89 ABNORMAL CT OF THE CHEST: Primary | ICD-10-CM

## 2022-04-18 DIAGNOSIS — R91.1 LUNG NODULE, SOLITARY: ICD-10-CM

## 2022-04-18 PROCEDURE — 99212 OFFICE O/P EST SF 10 MIN: CPT | Performed by: NURSE PRACTITIONER

## 2022-04-18 NOTE — PROGRESS NOTES
Chief Complaint   Patient presents with   • spot on lung     followup         Subjective   Tobias Newberry is a 56 y.o. male.     History of Present Illness   The patient comes in today for abnormal CT follow up.    He denies any shortness of breath. He denies any cough.     The lung nodule was found on follow-up work-up after being diagnosed with kidney cancer.    The following portions of the patient's history were reviewed and updated as appropriate: allergies, current medications, past family history, past medical history, past social history and past surgical history.    Review of Systems   Constitutional: Negative for chills.   HENT: Negative for sinus pressure.    Respiratory: Negative for cough, shortness of breath and wheezing.    Psychiatric/Behavioral: Negative for sleep disturbance.       Objective   Visit Vitals  /92 (BP Location: Left arm, Patient Position: Sitting, Cuff Size: Adult)   Pulse 75   Temp 97.1 °F (36.2 °C)   Resp 12   Wt 94.8 kg (209 lb)   SpO2 99%   BMI 29.16 kg/m²         Physical Exam  Vitals reviewed.   HENT:      Head: Atraumatic.      Mouth/Throat:      Mouth: Mucous membranes are moist.   Eyes:      Extraocular Movements: Extraocular movements intact.   Cardiovascular:      Rate and Rhythm: Normal rate and regular rhythm.   Pulmonary:      Effort: Pulmonary effort is normal. No respiratory distress.      Breath sounds: Normal breath sounds. No wheezing or rhonchi.   Musculoskeletal:      Cervical back: Neck supple.      Comments: Gait normal.   Skin:     General: Skin is warm.   Neurological:      Mental Status: He is alert and oriented to person, place, and time.             Assessment/Plan   Diagnoses and all orders for this visit:    1. Abnormal CT of the chest (Primary)    2. Lung nodule, solitary           Return if symptoms worsen or fail to improve.    DISCUSSION (if any):  The CT chest has not changed from previous. He follows with Dr. Pizano and they will continue  to monitor CT chest for changes. He is undergoing treatment with immunotherapy now.     Since he follows with Dr. Pizano on a regular basis, we will let them continue to monitor CT of the chest for changes in the lung nodule.    Study Result    Narrative & Impression   PROCEDURE: CT CHEST WO CONTRAST DIAGNOSTIC-     HISTORY: Lung nodule, > 8mm; R93.89-Abnormal findings on diagnostic  imaging of other specified body structures; R91.1-Solitary pulmonary  nodule     COMPARISON: 1/20/2022.     PROCEDURE:  Multiple axial CT images were obtained from the thoracic  inlet through the upper abdomen without the use of contrast.      FINDINGS:   Soft tissue windows reveal no axillary, hilar or mediastinal adenopathy.  Heart size is normal. The aorta is normal in caliber. There are no  pleural or pericardial effusions. Lung windows redemonstrate a 10 mm  nodular opacity in the anterior right upper lobe on image 27 which is  unchanged. No new or enlarging pulmonary nodules are identified. Within  the visualized upper abdomen note is made of a cyst in the left lobe of  the liver. The patient is status post left nephrectomy. The upper  abdomen is otherwise unremarkable. Bone windows reveal no acute osseous  abnormalities.     IMPRESSION:  10 mm nodular opacity in the anterior right upper lobe which  is unchanged. A follow-up noncontrast CT of the chest in three months is  recommended to ensure continued stability.     817.85 mGy.cm        This study was performed with techniques to keep radiation doses as low  as reasonably achievable (ALARA). Individualized dose reduction  techniques using automated exposure control or adjustment of mA and/or  kV according to the patient size were employed.      This report was signed and finalized on 4/8/2022 8:43 AM by Bob Parker M.D..         Dictated utilizing Dragon dictation.    This document was electronically signed by JAMI Smith April 18, 2022  10:38 EDT

## 2022-05-04 DIAGNOSIS — C64.2 CARCINOMA OF LEFT KIDNEY: Primary | ICD-10-CM

## 2022-05-06 ENCOUNTER — OFFICE VISIT (OUTPATIENT)
Dept: ONCOLOGY | Facility: CLINIC | Age: 57
End: 2022-05-06

## 2022-05-06 ENCOUNTER — HOSPITAL ENCOUNTER (OUTPATIENT)
Dept: ONCOLOGY | Facility: HOSPITAL | Age: 57
Setting detail: INFUSION SERIES
Discharge: HOME | End: 2022-05-06

## 2022-05-06 ENCOUNTER — APPOINTMENT (OUTPATIENT)
Dept: ONCOLOGY | Facility: HOSPITAL | Age: 57
End: 2022-05-06

## 2022-05-06 VITALS
WEIGHT: 205 LBS | HEIGHT: 71 IN | DIASTOLIC BLOOD PRESSURE: 91 MMHG | OXYGEN SATURATION: 99 % | RESPIRATION RATE: 18 BRPM | HEART RATE: 72 BPM | BODY MASS INDEX: 28.7 KG/M2 | TEMPERATURE: 97.7 F | SYSTOLIC BLOOD PRESSURE: 162 MMHG

## 2022-05-06 DIAGNOSIS — C64.2 CARCINOMA OF LEFT KIDNEY: Primary | ICD-10-CM

## 2022-05-06 LAB
ALBUMIN SERPL-MCNC: 4.2 G/DL (ref 3.5–5.2)
ALBUMIN/GLOB SERPL: 1.4 G/DL
ALP SERPL-CCNC: 102 U/L (ref 39–117)
ALT SERPL W P-5'-P-CCNC: 15 U/L (ref 1–41)
ANION GAP SERPL CALCULATED.3IONS-SCNC: 12 MMOL/L (ref 5–15)
AST SERPL-CCNC: 15 U/L (ref 1–40)
BILIRUB SERPL-MCNC: 0.4 MG/DL (ref 0–1.2)
BUN SERPL-MCNC: 24 MG/DL (ref 6–20)
BUN/CREAT SERPL: 16 (ref 7–25)
CALCIUM SPEC-SCNC: 9.9 MG/DL (ref 8.6–10.5)
CHLORIDE SERPL-SCNC: 99 MMOL/L (ref 98–107)
CO2 SERPL-SCNC: 26 MMOL/L (ref 22–29)
CREAT SERPL-MCNC: 1.5 MG/DL (ref 0.76–1.27)
EGFRCR SERPLBLD CKD-EPI 2021: 54.3 ML/MIN/1.73
ERYTHROCYTE [DISTWIDTH] IN BLOOD BY AUTOMATED COUNT: 14.3 % (ref 12.3–15.4)
GLOBULIN UR ELPH-MCNC: 3.1 GM/DL
GLUCOSE SERPL-MCNC: 112 MG/DL (ref 65–99)
HCT VFR BLD AUTO: 39.2 % (ref 37.5–51)
HGB BLD-MCNC: 13 G/DL (ref 13–17.7)
LYMPHOCYTES # BLD AUTO: 1.5 10*3/MM3 (ref 0.7–3.1)
LYMPHOCYTES NFR BLD AUTO: 22.3 % (ref 19.6–45.3)
MCH RBC QN AUTO: 28.2 PG (ref 26.6–33)
MCHC RBC AUTO-ENTMCNC: 33.1 G/DL (ref 31.5–35.7)
MCV RBC AUTO: 85.2 FL (ref 79–97)
MONOCYTES # BLD AUTO: 0.5 10*3/MM3 (ref 0.1–0.9)
MONOCYTES NFR BLD AUTO: 7.5 % (ref 5–12)
NEUTROPHILS NFR BLD AUTO: 4.8 10*3/MM3 (ref 1.7–7)
NEUTROPHILS NFR BLD AUTO: 70.2 % (ref 42.7–76)
PLATELET # BLD AUTO: 301 10*3/MM3 (ref 140–450)
PMV BLD AUTO: 7 FL (ref 6–12)
POTASSIUM SERPL-SCNC: 4.5 MMOL/L (ref 3.5–5.2)
PROT SERPL-MCNC: 7.3 G/DL (ref 6–8.5)
RBC # BLD AUTO: 4.6 10*6/MM3 (ref 4.14–5.8)
SODIUM SERPL-SCNC: 137 MMOL/L (ref 136–145)
T4 FREE SERPL-MCNC: 1.34 NG/DL (ref 0.93–1.7)
TSH SERPL DL<=0.05 MIU/L-ACNC: 1.43 UIU/ML (ref 0.27–4.2)
WBC NRBC COR # BLD: 6.8 10*3/MM3 (ref 3.4–10.8)

## 2022-05-06 PROCEDURE — 84439 ASSAY OF FREE THYROXINE: CPT | Performed by: INTERNAL MEDICINE

## 2022-05-06 PROCEDURE — 96413 CHEMO IV INFUSION 1 HR: CPT

## 2022-05-06 PROCEDURE — 80050 GENERAL HEALTH PANEL: CPT | Performed by: INTERNAL MEDICINE

## 2022-05-06 PROCEDURE — 99214 OFFICE O/P EST MOD 30 MIN: CPT | Performed by: INTERNAL MEDICINE

## 2022-05-06 PROCEDURE — 25010000002 PEMBROLIZUMAB 100 MG/4ML SOLUTION 4 ML VIAL: Performed by: INTERNAL MEDICINE

## 2022-05-06 RX ORDER — SODIUM CHLORIDE 9 MG/ML
250 INJECTION, SOLUTION INTRAVENOUS ONCE
Status: CANCELLED | OUTPATIENT
Start: 2022-05-27

## 2022-05-06 RX ORDER — SODIUM CHLORIDE 9 MG/ML
250 INJECTION, SOLUTION INTRAVENOUS ONCE
Status: COMPLETED | OUTPATIENT
Start: 2022-05-06 | End: 2022-05-06

## 2022-05-06 RX ORDER — SODIUM CHLORIDE 9 MG/ML
250 INJECTION, SOLUTION INTRAVENOUS ONCE
Status: CANCELLED | OUTPATIENT
Start: 2022-05-06

## 2022-05-06 RX ADMIN — SODIUM CHLORIDE 200 MG: 9 INJECTION, SOLUTION INTRAVENOUS at 09:11

## 2022-05-06 RX ADMIN — SODIUM CHLORIDE 250 ML: 9 INJECTION, SOLUTION INTRAVENOUS at 09:11

## 2022-05-06 NOTE — PROGRESS NOTES
DATE OF VISIT: 5/6/2022    REASON FOR VISIT: Followup for left kidney cancer     PROBLEM LIST:  1. 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  Presented with hematuria and back pain  B.  CT abdomen pelvis done January 16, 2022 revealed 6 cm left kidney mass  C.  Status post radical nephrectomy done by Dr. Obrien February 16, 2022  D.  Final pathology confirmed 6 cm, grade 2 clear cell carcinoma, invades renal vein, clear surgical margins, and no lymphovascular invasion.  E. Started adjuvant immunotherapy 3/18/2022, status post 2 cycles of treatment.    2.  Hypertension  3.  Postoperative pain  4.  Right upper lobe lung nodule, 1 cm:  A.  Incidentally noted on CT chest done January 20, 2022  5.  Family history of kidney cancer  6. Immune-therapy induced pruritis    HISTORY OF PRESENT ILLNESS: The patient is a very pleasant 56 y.o. male  with past medical history significant for left kidney cancer diagnosed February 2022.  He is status post radical left nephrectomy.  He was started on adjuvant Keytruda March 18, 2022.  The patient is here today for scheduled follow-up visit with treatment cycle #2.    SUBJECTIVE: The patient is here today by himself.  All in all he is doing fair.  He is able to tolerate treatment multiple side effects with his having mild arthralgias affecting his daily activities.  He noticed more fatigue after the last infusion.    Past History:  Medical History: has a past medical history of Anxiety, Cancer (HCC), Depression, Ross catheter in place (01/31/2022), Hematuria, Hypertension, PONV (postoperative nausea and vomiting), Tattoo, Tinnitus, and Wears glasses.   Surgical History: has a past surgical history that includes Vasectomy and Nephrectomy (Left, 2/16/2022).   Family History: family history includes Cancer in his brother and mother; Heart disease in his father and paternal uncle; Leukemia in his maternal aunt; Skin cancer in his maternal grandfather.   Social History:  "reports that he quit smoking about 6 years ago. He has a 6.00 pack-year smoking history. His smokeless tobacco use includes chew. He reports current alcohol use. He reports that he does not use drugs.    (Not in a hospital admission)     Allergies: Patient has no known allergies.     Review of Systems   Constitutional: Positive for fatigue.   Musculoskeletal: Positive for arthralgias.   Skin:        itching   All other systems reviewed and are negative.        Current Outpatient Medications:   •  amLODIPine (NORVASC) 2.5 MG tablet, TAKE 1 TABLET BY MOUTH EVERY DAY, Disp: 90 tablet, Rfl: 3  •  ondansetron (ZOFRAN) 8 MG tablet, Take 1 tablet by mouth 3 (Three) Times a Day As Needed for Nausea or Vomiting., Disp: 30 tablet, Rfl: 5    PHYSICAL EXAMINATION:   /91   Pulse 72   Temp 97.7 °F (36.5 °C) (Temporal)   Resp 18   Ht 180.3 cm (70.98\")   Wt 93 kg (205 lb)   SpO2 99%   BMI 28.60 kg/m²    Pain Score    05/06/22 0806   PainSc: 0-No pain        ECOG score: 0        ECOG Performance Status: 1 - Symptomatic but completely ambulatory  General Appearance:  alert, cooperative, no apparent distress and appears stated age   Neurologic/Psychiatric: A&O x 3, gait steady, appropriate affect, strength 5/5 in all muscle groups   HEENT:  Normocephalic, without obvious abnormality, mucous membranes moist   Neck: Supple, symmetrical, trachea midline, no adenopathy;  No thyromegaly, masses, or tenderness   Lungs:   Clear to auscultation bilaterally; respirations regular, even, and unlabored bilaterally   Heart:  Regular rate and rhythm, no murmurs appreciated   Abdomen:   Soft, non-tender, non-distended and no organomegaly   Lymph nodes: No cervical, supraclavicular, inguinal or axillary adenopathy noted   Extremities: Normal, atraumatic; no clubbing, cyanosis, or edema    Skin: No rashes, ulcers, or suspicious lesions noted     Hospital Outpatient Visit on 05/06/2022   Component Date Value Ref Range Status   • WBC " 05/06/2022 6.80  3.40 - 10.80 10*3/mm3 Final   • RBC 05/06/2022 4.60  4.14 - 5.80 10*6/mm3 Final   • Hemoglobin 05/06/2022 13.0  13.0 - 17.7 g/dL Final   • Hematocrit 05/06/2022 39.2  37.5 - 51.0 % Final   • RDW 05/06/2022 14.3  12.3 - 15.4 % Final   • MCV 05/06/2022 85.2  79.0 - 97.0 fL Final   • MCH 05/06/2022 28.2  26.6 - 33.0 pg Final   • MCHC 05/06/2022 33.1  31.5 - 35.7 g/dL Final   • MPV 05/06/2022 7.0  6.0 - 12.0 fL Final   • Platelets 05/06/2022 301  140 - 450 10*3/mm3 Final   • Neutrophil % 05/06/2022 70.2  42.7 - 76.0 % Final   • Lymphocyte % 05/06/2022 22.3  19.6 - 45.3 % Final   • Monocyte % 05/06/2022 7.5  5.0 - 12.0 % Final   • Neutrophils, Absolute 05/06/2022 4.80  1.70 - 7.00 10*3/mm3 Final   • Lymphocytes, Absolute 05/06/2022 1.50  0.70 - 3.10 10*3/mm3 Final   • Monocytes, Absolute 05/06/2022 0.50  0.10 - 0.90 10*3/mm3 Final        CT Chest Without Contrast Diagnostic    Result Date: 4/8/2022  Narrative: PROCEDURE: CT CHEST WO CONTRAST DIAGNOSTIC-  HISTORY: Lung nodule, > 8mm; R93.89-Abnormal findings on diagnostic imaging of other specified body structures; R91.1-Solitary pulmonary nodule  COMPARISON: 1/20/2022.  PROCEDURE:  Multiple axial CT images were obtained from the thoracic inlet through the upper abdomen without the use of contrast.  FINDINGS: Soft tissue windows reveal no axillary, hilar or mediastinal adenopathy. Heart size is normal. The aorta is normal in caliber. There are no pleural or pericardial effusions. Lung windows redemonstrate a 10 mm nodular opacity in the anterior right upper lobe on image 27 which is unchanged. No new or enlarging pulmonary nodules are identified. Within the visualized upper abdomen note is made of a cyst in the left lobe of the liver. The patient is status post left nephrectomy. The upper abdomen is otherwise unremarkable. Bone windows reveal no acute osseous abnormalities.      Impression: 10 mm nodular opacity in the anterior right upper lobe which is  unchanged. A follow-up noncontrast CT of the chest in three months is recommended to ensure continued stability.  817.85 mGy.cm   This study was performed with techniques to keep radiation doses as low as reasonably achievable (ALARA). Individualized dose reduction techniques using automated exposure control or adjustment of mA and/or kV according to the patient size were employed.  This report was signed and finalized on 4/8/2022 8:43 AM by Bob Parker M.D..      ASSESSMENT: The patient is a very pleasant 56 y.o. male  with left kidney cancer      PLAN:    1.  Left kidney cancer:  A.  I will proceed with adjuvant Keytruda 20 mg IV every 3 weeks cycle # 3 today.  B.  The patient will follow up with us in 3 weeks for cycle # 4.   C.  We will plan to finish 2 years of treatment as tolerated.  D.  I will monitor the patient blood work including blood counts, kidney function, liver functions, and electrolytes.  E.  I reviewed the lab results from May 6, 2022.  His CBC is normal.  I will follow-up on his CMP from today.  F. We reviewed again the potential side effects of immunotherapy including but not limited to immune mediated reactions with thyroiditis, pneumonitis, hepatitis, colitis, rash, and electrolyte abnormalities, fatigue, multiorgan failure, and possibly death.  G.  The patient is scheduled for follow-up scans in May ordered by urology.  I will follow up on the results.  He is scheduled for May 13, 2021.    2.  Right upper lobe lung nodule:  A.  I reviewed the CT results from 4/8/2022 with the patient. The right upper lobe lung nodule was stable in size at 10 mm.   B. He will follow up with Dr. Neff from pulmonary.     3.  Treatment induced nausea:  A.  He will continue use of Zofran as needed for treatment induced nausea.    4.  Hypertension:  A.  I will continue Norvasc 2.5 mg daily.  B.  We will monitor the patient blood pressure while on treatment.  He would be at risk for hypotension as well as  hypertension.    5. Immune-therapy induced pruritis:  A. I encouraged him to keep his skin well moisturized. He will monitor for rash or worsening symptoms. If his itching worsens we can consider antihistamines to help.        FOLLOW UP: 3 weeks with next cycle.    Garrett Pizano MD  5/6/2022

## 2022-05-13 ENCOUNTER — HOSPITAL ENCOUNTER (OUTPATIENT)
Dept: CT IMAGING | Facility: HOSPITAL | Age: 57
Discharge: HOME | End: 2022-05-13

## 2022-05-13 DIAGNOSIS — C64.2 RENAL CELL CARCINOMA OF LEFT KIDNEY: ICD-10-CM

## 2022-05-13 PROCEDURE — 71260 CT THORAX DX C+: CPT

## 2022-05-13 PROCEDURE — 74177 CT ABD & PELVIS W/CONTRAST: CPT

## 2022-05-13 PROCEDURE — 25010000002 IOPAMIDOL 61 % SOLUTION: Performed by: UROLOGY

## 2022-05-13 RX ADMIN — IOPAMIDOL 100 ML: 612 INJECTION, SOLUTION INTRAVENOUS at 12:03

## 2022-05-13 NOTE — PROGRESS NOTES
Tell patient great news, we do not see anything on his scans.  I will discuss further with him at follow-up appointment.

## 2022-05-26 ENCOUNTER — DOCUMENTATION (OUTPATIENT)
Dept: GENETICS | Facility: HOSPITAL | Age: 57
End: 2022-05-26

## 2022-05-26 NOTE — PROGRESS NOTES
Per PDMP:  Last Dispensed:11/27/20  Due:12/27/20    Message sent to provider to sign.     Tobias Newberry is a 56-year-old male who was seen for genetic counseling due to a personal and family history of kidney cancer. Mr. 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 He has never had a colonoscopy.  Mr. Newberry was interested in discussing his risk of a hereditary cancer syndrome. Based on Mr. Newberry’s personal history and family history, the CancerNext-Expanded panel through [facility] ([facility]) was ordered, which is a multigene panel evaluating 77 genes associated with an increased cancer risk. 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 Results were discussed with Mr. Newberry by telephone on 5/26/22.       Pertinent Family History: (See attached pedigree)   Mother:  Kidney cancer, 52  Mat. Aunt:  Kidney cancer, 3  Brother:  Hodgkin Lymphoma, 16  Mat. Grandfather: Skin cancer    We do not have medical records regarding the family history.    Risk Assessment:  Mr. Newberry’s personal and family history of cancer raises the question of a hereditary cancer syndrome. Based on the combination of cancers, Von Hippel-Lindau syndrome was discussed in detail. NCCN guidelines recommend genetic testing when an individual has been diagnosed with kidney cancer and there is a family history of at least one first or second degree relative also diagnosed with kidney cancer. We discussed the option of testing a number of other cancer susceptibility genes through a multigene panel. This risk assessment is based on the history information provided at the time of the appointment and could change in the future should new information be obtained.    Genetic Counseling: We reviewed the family history information in detail.  Cases of cancer follow three general patterns: sporadic, familial, and hereditary.  While most cancer is sporadic, some cases appear to occur in family clusters. Familial cases may be due to a  combination of shared genes and environmental factors among family members.  In even fewer families, the cancer is said to be inherited, and the genes responsible for the cancer are known.      Family histories typical of hereditary cancer syndromes usually include multiple first- and second-degree relatives diagnosed with cancer types that define a syndrome.  These cases tend to be diagnosed at younger-than-expected ages and can be bilateral or multifocal.  The cancer in these families follows an   autosomal dominant inheritance pattern, which indicates the likely presence of a mutation in a cancer susceptibility gene.  Children and siblings of an individual believed to carry this mutation have a 50% chance   of inheriting that mutation, thereby inheriting the increased risk to develop cancer.  These mutations can be passed down from the maternal or the paternal lineage.    Given the family history, we discussed the possibility of VHL. Von Hippel-Lindau (VHL) syndrome is characterized by hemangioblastomas of the brain, spinal cord, and retina; renal cysts and clear cell renal carcinoma; pheochromocytoma, pancreatic cysts, and neuroendocrine tumors; endolymphatic sac tumors; and epididymal and broad ligament cysts. Cerebellar hemangioblastomas may be associated with headache, vomiting, gait disturbances, or ataxia. Spinal hemangioblastomas and related syrinx usually present with pain. Sensory and motor loss may develop with cord compression. Retinal hemangioblastomas may be the initial manifestation of VHL syndrome and can cause vision loss. Renal cell carcinoma occurs in about 70% of individuals with VHL. Pheochromocytomas can be asymptomatic but may cause sustained or episodic hypertension. Pancreatic lesions often remain asymptomatic and rarely cause endocrine or exocrine insufficiency. Endolymphatic sac tumors can cause hearing loss of varying severity, which can be a presenting symptom. Cystadenomas of the  epididymis are relatively common.     We discussed that there are other hereditary cancer syndromes that can be tested on multigene panels. Some of these genes have well defined cancer risks and established management guidelines.  Other genes that can be tested for have been more recently described, and there may be less data regarding the risks and therefore may not have established management guidelines.  We discussed these limitations at length. Based on Mr. Newberry’s personal history and his desire to get more information regarding his personal risks and potential risks for his family, he opted to pursue testing through a panel evaluating several other genes known to increase the risk for cancer.    Genetic Testing:  Due to Mr. Newberry’s personal and family history, he pursued a multigene panel which would look at multiple genes concurrently. The risks, benefits and limitations of multigene panel testing and implications for clinical management following testing were reviewed. DNA test results can influence decisions regarding screening, prevention and surgical management.  Genetic testing can have significant psychological implications for both individuals and families.  Also discussed was the possibility of employment and insurance discrimination based on genetic test results and the laws in place to prevent this (SISI).    The implications of a positive or negative test result were discussed.  We also discussed the possibility that, in some cases, genetic test results may be informative or may be ambiguous due to the identification of a genetic variant of uncertain significance (VUS).  These variants may or may not be associated with an increased cancer risk. The limited value of negative test results was emphasized, particularly given the significant population risk for cancer and the existence of other cancer susceptibility genes.  Given Mr. Newberry’s history, a negative test result would not eliminate all  cancer risk to his family members, although the risk would not be as high as it would with positive genetic testing.    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 Mutations in the POT1 gene are causative of POT1 Tumor Predisposition (POT1-TPD), which is a relatively recently described hereditary predisposition syndrome. Relatives could pursue genetic testing for the POT1 mutation to determine whether they are at an increased risk for the associated cancers.  His siblings and children each have a 50% chance of having inherited this mutation. Relatives would need a copy of Mr. Newberry’s genetic test result to ensure they were being tested for the correct mutation.      Until each at-risk family member has been proven not to carry this POT1 mutation, they could be offered increased surveillance.  We would be happy to see family members who live in the area in our clinic to further discuss this information and testing options. For family members who live elsewhere, there are genetic counselors at most [facility] medical centers. They can find a genetic counselor by visiting the National Society of Genetic Counselors website at www.nsgc.org.    Cancer Risk: POT1 tumor predisposition (POT1-TPD) is characterized by an increased lifetime risk for multiple cutaneous melanomas, chronic lymphocytic leukemia (CLL), angiosarcoma (particularly cardiac angiosarcomas), and gliomas. Additional cancers (e.g., colorectal cancer, thyroid cancer, breast angiosarcomas) have been reported in individuals with POT1-TPD but with very limited evidence. The age of onset for first primary cutaneous melanoma ranges from 15 to 80 years. The majority of POT1 associated cancers are diagnosed in adulthood.  Lifetime cancer risk estimates for POT1 are not currently available.     It is important to note that clinical manifestations of POT1-TPD cannot be predicted in family members who also test positive for this POT1  mutation because the full phenotypic spectrum and penetrance of POT1-TPD are unknown. The types of POT1-related tumors can vary among different members of the same family and current evidence is limited to disease-focused studies.  To date, fewer than 100 families with a germline POT1 variant have been identified. Due to the limited number of families reported to date, the penetrance, natural history, and frequencies of the POT1-associated tumors are yet to be determined. We expect the tumor/cancer types associated with POT1 germline mutations to change/expand as more research is performed.  It is currently unknown whether ionizing radiation poses an increased risk to individuals with POT1-TPD, but because of the need for lifelong surveillance, it seems reasonable to avoid radiation in diagnostic procedures and instead recommend MRI or ultrasonography.    There are no published guidelines for surveillance for individuals with POT1-TPD. Decisions regarding individual surveillance protocols should be based on the emerging phenotypic spectrum of POT1-TPD, as well as on the affected individual’s personal and family history. Below are screening recommendations that could be considered:    • Full skin examination by a dermatologist beginning at age 18 years at least every six months with excision of any lesions suspicious for melanoma; consider exams every three months in individuals with multiple atypical nevi, history of melanoma, and/or family history of melanoma.   • CBC with differential annually beginning at age 18 years to screen for CLL. Annual comprehensive physical examination including examination of lymph nodes.   • Whole-body MRI annually in families fulfilling Li-Fraumeni syndrome or Li-Fraumeni-like criteria beginning at age 18 years; Consider whole-body MRI every one to two years depending on personal and family history of non-cutaneous, non-brain malignancies.   • Consider brain MRI every one to two years  beginning at age 18 years depending on family history of glioma.    Based on the family history known, Mr. Newberry’s family history does not fulfill Li-Fraumeni syndrome criteria. Mr. Newberry’s surveillance and management should be determined by his oncology team. Mr. Newberry is encouraged to re-contact genetics periodically to determine whether there have been additional findings or updated screening guidelines for POT1.     Plan:  Genetic counseling remains available to Mr. Newberry. I will follow-up with Mr. Newberry at his next infusion appointment on 5/27/22 to review results further in person. Should he have any questions or concerns, he is welcome to contact us at [phone].      Lety Hernandez MS, [facility], LGC  Licensed Certified Genetic Counselor      Cc: MD Yolanda Orellana, JAMI

## 2022-05-27 ENCOUNTER — OFFICE VISIT (OUTPATIENT)
Dept: UROLOGY | Facility: CLINIC | Age: 57
End: 2022-05-27

## 2022-05-27 ENCOUNTER — HOSPITAL ENCOUNTER (OUTPATIENT)
Dept: ONCOLOGY | Facility: HOSPITAL | Age: 57
Setting detail: INFUSION SERIES
Discharge: HOME | End: 2022-05-27

## 2022-05-27 ENCOUNTER — OFFICE VISIT (OUTPATIENT)
Dept: ONCOLOGY | Facility: CLINIC | Age: 57
End: 2022-05-27

## 2022-05-27 VITALS
HEIGHT: 71 IN | WEIGHT: 205 LBS | OXYGEN SATURATION: 98 % | BODY MASS INDEX: 28.7 KG/M2 | DIASTOLIC BLOOD PRESSURE: 84 MMHG | HEART RATE: 102 BPM | SYSTOLIC BLOOD PRESSURE: 148 MMHG | TEMPERATURE: 98.3 F

## 2022-05-27 VITALS
WEIGHT: 205 LBS | TEMPERATURE: 98.3 F | HEART RATE: 102 BPM | SYSTOLIC BLOOD PRESSURE: 148 MMHG | DIASTOLIC BLOOD PRESSURE: 84 MMHG | OXYGEN SATURATION: 97 % | BODY MASS INDEX: 28.7 KG/M2 | HEIGHT: 71 IN | RESPIRATION RATE: 18 BRPM

## 2022-05-27 DIAGNOSIS — C64.2 CARCINOMA OF LEFT KIDNEY: Primary | ICD-10-CM

## 2022-05-27 LAB
ALBUMIN SERPL-MCNC: 4.5 G/DL (ref 3.5–5.2)
ALBUMIN/GLOB SERPL: 1.5 G/DL
ALP SERPL-CCNC: 99 U/L (ref 39–117)
ALT SERPL W P-5'-P-CCNC: 15 U/L (ref 1–41)
ANION GAP SERPL CALCULATED.3IONS-SCNC: 11 MMOL/L (ref 5–15)
AST SERPL-CCNC: 16 U/L (ref 1–40)
BILIRUB SERPL-MCNC: 0.2 MG/DL (ref 0–1.2)
BUN SERPL-MCNC: 27 MG/DL (ref 6–20)
BUN/CREAT SERPL: 16.2 (ref 7–25)
CALCIUM SPEC-SCNC: 9.9 MG/DL (ref 8.6–10.5)
CHLORIDE SERPL-SCNC: 106 MMOL/L (ref 98–107)
CO2 SERPL-SCNC: 26 MMOL/L (ref 22–29)
CREAT SERPL-MCNC: 1.67 MG/DL (ref 0.76–1.27)
EGFRCR SERPLBLD CKD-EPI 2021: 47.7 ML/MIN/1.73
ERYTHROCYTE [DISTWIDTH] IN BLOOD BY AUTOMATED COUNT: 14.9 % (ref 12.3–15.4)
GLOBULIN UR ELPH-MCNC: 3 GM/DL
GLUCOSE SERPL-MCNC: 132 MG/DL (ref 65–99)
HCT VFR BLD AUTO: 41.4 % (ref 37.5–51)
HGB BLD-MCNC: 13 G/DL (ref 13–17.7)
LYMPHOCYTES # BLD AUTO: 1.3 10*3/MM3 (ref 0.7–3.1)
LYMPHOCYTES NFR BLD AUTO: 17.6 % (ref 19.6–45.3)
MCH RBC QN AUTO: 26.8 PG (ref 26.6–33)
MCHC RBC AUTO-ENTMCNC: 31.4 G/DL (ref 31.5–35.7)
MCV RBC AUTO: 85.4 FL (ref 79–97)
MONOCYTES # BLD AUTO: 0.4 10*3/MM3 (ref 0.1–0.9)
MONOCYTES NFR BLD AUTO: 4.7 % (ref 5–12)
NEUTROPHILS NFR BLD AUTO: 5.8 10*3/MM3 (ref 1.7–7)
NEUTROPHILS NFR BLD AUTO: 77.7 % (ref 42.7–76)
PLATELET # BLD AUTO: 312 10*3/MM3 (ref 140–450)
PMV BLD AUTO: 7.1 FL (ref 6–12)
POTASSIUM SERPL-SCNC: 5 MMOL/L (ref 3.5–5.2)
PROT SERPL-MCNC: 7.5 G/DL (ref 6–8.5)
RBC # BLD AUTO: 4.84 10*6/MM3 (ref 4.14–5.8)
SODIUM SERPL-SCNC: 143 MMOL/L (ref 136–145)
WBC NRBC COR # BLD: 7.5 10*3/MM3 (ref 3.4–10.8)

## 2022-05-27 PROCEDURE — 85025 COMPLETE CBC W/AUTO DIFF WBC: CPT | Performed by: INTERNAL MEDICINE

## 2022-05-27 PROCEDURE — 96413 CHEMO IV INFUSION 1 HR: CPT

## 2022-05-27 PROCEDURE — 99213 OFFICE O/P EST LOW 20 MIN: CPT | Performed by: UROLOGY

## 2022-05-27 PROCEDURE — 80053 COMPREHEN METABOLIC PANEL: CPT | Performed by: INTERNAL MEDICINE

## 2022-05-27 PROCEDURE — 25010000002 PEMBROLIZUMAB 100 MG/4ML SOLUTION 4 ML VIAL: Performed by: INTERNAL MEDICINE

## 2022-05-27 PROCEDURE — 99214 OFFICE O/P EST MOD 30 MIN: CPT | Performed by: NURSE PRACTITIONER

## 2022-05-27 RX ADMIN — SODIUM CHLORIDE 200 MG: 9 INJECTION, SOLUTION INTRAVENOUS at 13:57

## 2022-05-27 NOTE — PROGRESS NOTES
DATE OF VISIT: 5/27/2022    REASON FOR VISIT: Followup for left kidney cancer     PROBLEM LIST:  1. 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  Presented with hematuria and back pain  B.  CT abdomen pelvis done January 16, 2022 revealed 6 cm left kidney mass  C.  Status post radical nephrectomy done by Dr. Obrien February 16, 2022  D.  Final pathology confirmed 6 cm, grade 2 clear cell carcinoma, invades renal vein, clear surgical margins, and no lymphovascular invasion.  E. Started adjuvant immunotherapy 3/18/2022, status post 2 cycles of treatment.    2.  Hypertension  3.  Postoperative pain  4.  Right upper lobe lung nodule, 1 cm:  A.  Incidentally noted on CT chest done January 20, 2022  5.  Family history of kidney cancer  6. Immune-therapy induced pruritis  7. POT1 gene mutation    HISTORY OF PRESENT ILLNESS: The patient is a very pleasant 56 y.o. male  with past medical history significant for left kidney cancer diagnosed February 2022.  He is status post radical left nephrectomy.  He was started on adjuvant Keytruda March 18, 2022.  The patient is here today for scheduled follow-up visit with treatment cycle #4.    SUBJECTIVE: The patient is here today by himself. He has been doing well since his last visit. He continues to tolerate his treatment with only mild side effects, most notably fatigue. He is still active and working, and he actually had less fatigue after his last treatment compared to previous ones. He denies skin rash or diarrhea. He has had no nausea or vomiting. He saw his urologist this morning to go over scan results that he was pleased looked excellent.     Past History:  Medical History: has a past medical history of Anxiety, Cancer (HCC), Depression, Ross catheter in place (01/31/2022), Hematuria, Hypertension, PONV (postoperative nausea and vomiting), Tattoo, Tinnitus, and Wears glasses.   Surgical History: has a past surgical history that includes Vasectomy and Nephrectomy  "(Left, 2/16/2022).   Family History: family history includes Cancer in his brother and mother; Heart disease in his father and paternal uncle; Leukemia in his maternal aunt; Skin cancer in his maternal grandfather.   Social History: reports that he quit smoking about 6 years ago. He has a 6.00 pack-year smoking history. His smokeless tobacco use includes chew. He reports current alcohol use. He reports that he does not use drugs.    (Not in a hospital admission)     Allergies: Patient has no known allergies.     Review of Systems   Constitutional: Positive for fatigue.   Musculoskeletal: Positive for arthralgias.   All other systems reviewed and are negative.        Current Outpatient Medications:   •  amLODIPine (NORVASC) 2.5 MG tablet, TAKE 1 TABLET BY MOUTH EVERY DAY, Disp: 90 tablet, Rfl: 3  •  ondansetron (ZOFRAN) 8 MG tablet, Take 1 tablet by mouth 3 (Three) Times a Day As Needed for Nausea or Vomiting., Disp: 30 tablet, Rfl: 5    PHYSICAL EXAMINATION:   /84   Pulse 102   Temp 98.3 °F (36.8 °C) (Temporal)   Resp 18   Ht 180.3 cm (70.98\")   Wt 93 kg (205 lb)   SpO2 97%   BMI 28.60 kg/m²    Pain Score    05/27/22 1248   PainSc: 0-No pain              ECOG Performance Status: 1 - Symptomatic but completely ambulatory  General Appearance:  alert, cooperative, no apparent distress and appears stated age   Lungs:   Clear to auscultation bilaterally; respirations regular, even, and unlabored bilaterally   Heart:  Regular rate and rhythm, no murmurs appreciated   Abdomen:   Soft, non-tender, non-distended and no organomegaly     Hospital Outpatient Visit on 05/27/2022   Component Date Value Ref Range Status   • Glucose 05/27/2022 132 (A) 65 - 99 mg/dL Final   • BUN 05/27/2022 27 (A) 6 - 20 mg/dL Final   • Creatinine 05/27/2022 1.67 (A) 0.76 - 1.27 mg/dL Final   • Sodium 05/27/2022 143  136 - 145 mmol/L Final   • Potassium 05/27/2022 5.0  3.5 - 5.2 mmol/L Final    Slight hemolysis detected by analyzer. " Results may be affected.   • Chloride 05/27/2022 106  98 - 107 mmol/L Final   • CO2 05/27/2022 26.0  22.0 - 29.0 mmol/L Final   • Calcium 05/27/2022 9.9  8.6 - 10.5 mg/dL Final   • Total Protein 05/27/2022 7.5  6.0 - 8.5 g/dL Final   • Albumin 05/27/2022 4.50  3.50 - 5.20 g/dL Final   • ALT (SGPT) 05/27/2022 15  1 - 41 U/L Final   • AST (SGOT) 05/27/2022 16  1 - 40 U/L Final   • Alkaline Phosphatase 05/27/2022 99  39 - 117 U/L Final   • Total Bilirubin 05/27/2022 0.2  0.0 - 1.2 mg/dL Final   • Globulin 05/27/2022 3.0  gm/dL Final    Calculated Result   • A/G Ratio 05/27/2022 1.5  g/dL Final   • BUN/Creatinine Ratio 05/27/2022 16.2  7.0 - 25.0 Final   • Anion Gap 05/27/2022 11.0  5.0 - 15.0 mmol/L Final   • eGFR 05/27/2022 47.7 (A) >60.0 mL/min/1.73 Final    National Kidney Foundation and American Society of Nephrology (ASN) Task Force recommended calculation based on the Chronic Kidney Disease Epidemiology Collaboration (CKD-EPI) equation refit without adjustment for race.   • WBC 05/27/2022 7.50  3.40 - 10.80 10*3/mm3 Final   • RBC 05/27/2022 4.84  4.14 - 5.80 10*6/mm3 Final   • Hemoglobin 05/27/2022 13.0  13.0 - 17.7 g/dL Final   • Hematocrit 05/27/2022 41.4  37.5 - 51.0 % Final   • RDW 05/27/2022 14.9  12.3 - 15.4 % Final   • MCV 05/27/2022 85.4  79.0 - 97.0 fL Final   • MCH 05/27/2022 26.8  26.6 - 33.0 pg Final   • MCHC 05/27/2022 31.4 (A) 31.5 - 35.7 g/dL Final   • MPV 05/27/2022 7.1  6.0 - 12.0 fL Final   • Platelets 05/27/2022 312  140 - 450 10*3/mm3 Final   • Neutrophil % 05/27/2022 77.7 (A) 42.7 - 76.0 % Final   • Lymphocyte % 05/27/2022 17.6 (A) 19.6 - 45.3 % Final   • Monocyte % 05/27/2022 4.7 (A) 5.0 - 12.0 % Final   • Neutrophils, Absolute 05/27/2022 5.80  1.70 - 7.00 10*3/mm3 Final   • Lymphocytes, Absolute 05/27/2022 1.30  0.70 - 3.10 10*3/mm3 Final   • Monocytes, Absolute 05/27/2022 0.40  0.10 - 0.90 10*3/mm3 Final        CT Chest With Contrast Diagnostic    Result Date: 5/13/2022  Narrative:  PROCEDURE: CT CHEST W CONTRAST DIAGNOSTIC-  HISTORY: RCC surveillance; C64.2-Malignant neoplasm of left kidney, except renal pelvis  COMPARISON: 4/8/2022.  TECHNIQUE: Axial CT with IV contrast administration.  FINDINGS:  No acute lung disease is present.  A 10 mm nodule is stable of the right upper lobe on image 25.  No pleural or pericardial effusion is seen. No adenopathy or mass lesion is present.      Impression: 1. Stable appearance of 2 mm right upper lobe nodule. Recommend continued chest imaging follow-up in 6 months.   This study was performed with techniques to keep radiation doses as low as reasonably achievable (ALARA). Individualized dose reduction techniques using automated exposure control or adjustment of vA and/or kV according to the patient size were employed.  This report was signed and finalized on 5/13/2022 12:07 PM by Jaskaran Hanson MD.    CT Abdomen Pelvis With Contrast    Result Date: 5/13/2022  Narrative: CT ABDOMEN AND PELVIS WITH CONTRAST-  TECHNIQUE: IV contrast enhanced exam.  HISTORY: RCC surveillance; C64.2-Malignant neoplasm of left kidney, except renal pelvis.  COMPARISON: 01/16/2022  FINDINGS:  ABDOMEN: Hepatic cysts are noted. There has been interval left nephrectomy. Left adrenal gland has been resected as well. There is no mass in the nephrectomy bed.  Spleen, pancreas, right adrenal gland and right kidney are unremarkable.  No bowel obstruction is seen. Small umbilical hernia is noted containing fat.  PELVIS: No pelvic adenopathy is present. Appendix is normal. Wall thickening of the terminal ileum is noted. This could be due to infectious ileitis or less likely inflammatory bowel disease. This is similar from prior exam.      Impression: 1. Interval left nephrectomy and adrenalectomy. 2. No evidence of residual or recurrent mass left nephrectomy bed. 3. No evidence of metastatic disease.   This study was performed with techniques to keep radiation doses as low as reasonably  achievable (ALARA). Individualized dose reduction techniques using automated exposure control or adjustment of vA and/or kV according to the patient size were employed.  This report was signed and finalized on 5/13/2022 12:56 PM by Jaskaran Hanson MD.      ASSESSMENT: The patient is a very pleasant 56 y.o. male  with left kidney cancer      PLAN:    1.  Left kidney cancer:  A.  I will proceed with adjuvant Keytruda 20 mg IV every 3 weeks cycle # 4 today.  B.  The patient will follow up with us in 3 weeks for cycle # 5.   C.  We will plan to finish 2 years of treatment if tolerated.  D.  I will monitor the patient blood work including blood counts, kidney function, liver functions, and electrolytes. We will also continue to monitor thyroid studies periodically.   E.  I reviewed the lab results from today with the patient. I reassured him that his WBC, platelet count, and hemoglobin are normal. His CMP revealed creatinine of 1.67 which is slightly increased from previous visit. His liver enzymes and electrolytes are within normal limits.   F. We reviewed again the potential side effects of immunotherapy including but not limited to immune mediated reactions with thyroiditis, pneumonitis, hepatitis, colitis, rash, and electrolyte abnormalities, fatigue, multiorgan failure, and possibly death.  G.  I reviewed the CAT scan results from 5/13/2022 with the patient. He has no evidence of residual or progressive disease with stable 10 mm right upper lobe nodule. We will plan to repeat his CT scans in 6 months that will be due November 2022.     2.  Right upper lobe lung nodule:  A. The right upper lobe lung nodule was stable in size at 10 mm on most recent CT done 5/13/2022.   B. He will follow up with Dr. Neff from pulmonary.     3.  Treatment induced nausea:  A.  He will continue use of Zofran as needed for treatment induced nausea.    4.  Hypertension:  A.  I will continue Norvasc 2.5 mg daily.  B.  We will monitor the  patient blood pressure while on treatment.  He would be at risk for hypotension as well as hypertension.    5. Immune-therapy induced pruritis:  A. I encouraged him to keep his skin well moisturized. He will monitor for rash or worsening symptoms. If his itching worsens we can consider antihistamines to help.      6. POT1 Gene mutation:  A. I recommended he have annual skin exam due to higher risk for melanoma.   B. We will continue surveillance CT scans for renal cell carcinoma.   C. We will consider MRI brain in the future if he has symptoms.     FOLLOW UP: 3 weeks with next cycle.    Yolanda Shaffer, APRN  5/27/2022

## 2022-05-27 NOTE — PROGRESS NOTES
"Chief Complaint   Patient presents with   • Follow-up        HPI  Ms. Newberry is a 56 y.o. male with history below in assessment, who presents for follow up.     At this visit patient is doing well.  No complaints. Tolerating Keytruda well.     Past Medical History:   Diagnosis Date   • Anxiety    • Cancer (HCC)     renal cell carcinoma   • Depression     recent due to diagnosis   • Ross catheter in place 01/31/2022    Placed in ED   • Hematuria    • Hypertension    • PONV (postoperative nausea and vomiting)    • Tattoo    • Tinnitus    • Wears glasses        Past Surgical History:   Procedure Laterality Date   • NEPHRECTOMY Left 2/16/2022    Procedure: OPEN LEFT RADICAL NEPHRECTOMY WITH LEFT ADRENALECTOMY;  Surgeon: Manjinder Obrein MD;  Location: [facility];  Service: Urology;  Laterality: Left;   • VASECTOMY           Current Outpatient Medications:   •  amLODIPine (NORVASC) 2.5 MG tablet, TAKE 1 TABLET BY MOUTH EVERY DAY, Disp: 90 tablet, Rfl: 3  •  ondansetron (ZOFRAN) 8 MG tablet, Take 1 tablet by mouth 3 (Three) Times a Day As Needed for Nausea or Vomiting., Disp: 30 tablet, Rfl: 5     Physical Exam  Visit Vitals  /84   Pulse 102   Temp 98.3 °F (36.8 °C)   Ht 180.3 cm (70.98\")   Wt 93 kg (205 lb)   SpO2 98%   BMI 28.61 kg/m²       Labs  Brief Urine Lab Results  (Last result in the past 365 days)      Color   Clarity   Blood   Leuk Est   Nitrite   Protein   CREAT   Urine HCG        01/16/22 1903 Red   Cloudy   Large (3+)   Moderate (2+)   Positive   >=300 mg/dL (3+)                 Lab Results   Component Value Date    GLUCOSE 112 (H) 05/06/2022    CALCIUM 9.9 05/06/2022     05/06/2022    K 4.5 05/06/2022    CO2 26.0 05/06/2022    CL 99 05/06/2022    BUN 24 (H) 05/06/2022    CREATININE 1.50 (H) 05/06/2022    EGFRIFAFRI 50 (L) 02/24/2022    EGFRIFNONA 42 (L) 02/24/2022    BCR 16.0 05/06/2022    ANIONGAP 12.0 05/06/2022       Lab Results   Component Value Date    WBC 6.80 05/06/2022    HGB 13.0 " 05/06/2022    HCT 39.2 05/06/2022    MCV 85.2 05/06/2022     05/06/2022       Urine Culture    Urine Culture 1/8/22   Urine Culture Final report              Lab Results   Component Value Date    PSA 0.705 08/14/2018           Radiographic Studies  CT Chest With Contrast Diagnostic  Result Date: 5/13/2022  1. Stable appearance of 2 mm right upper lobe nodule. Recommend continued chest imaging follow-up in 6 months.   This study was performed with techniques to keep radiation doses as low as reasonably achievable (ALARA). Individualized dose reduction techniques using automated exposure control or adjustment of vA and/or kV according to the patient size were employed.  This report was signed and finalized on 5/13/2022 12:07 PM by Jaskaran Hanson MD.    CT Abdomen Pelvis With Contrast  Result Date: 5/13/2022  1. Interval left nephrectomy and adrenalectomy. 2. No evidence of residual or recurrent mass left nephrectomy bed. 3. No evidence of metastatic disease.   This study was performed with techniques to keep radiation doses as low as reasonably achievable (ALARA). Individualized dose reduction techniques using automated exposure control or adjustment of vA and/or kV according to the patient size were employed.  This report was signed and finalized on 5/13/2022 12:56 PM by Jaskaran Hanson MD.      I have reviewed above labs and imaging.     Assessment  56 y.o. male with  pT3a clear cell renal cell carcinoma, grade 2, status post open radical nephrectomy and adrenalectomy 2/16/2022.  Margins all negative.    Plan  1.  Follow-up in 6 months with repeat imaging and labs  2. Continue to follow with Oncology, on pembrolizumab

## 2022-06-07 ENCOUNTER — OFFICE VISIT (OUTPATIENT)
Dept: INTERNAL MEDICINE | Facility: CLINIC | Age: 57
End: 2022-06-07

## 2022-06-07 VITALS
HEIGHT: 71 IN | BODY MASS INDEX: 29.4 KG/M2 | WEIGHT: 210 LBS | TEMPERATURE: 97.1 F | DIASTOLIC BLOOD PRESSURE: 90 MMHG | HEART RATE: 84 BPM | SYSTOLIC BLOOD PRESSURE: 142 MMHG | OXYGEN SATURATION: 99 %

## 2022-06-07 DIAGNOSIS — M19.90 ARTHRITIS: ICD-10-CM

## 2022-06-07 DIAGNOSIS — Z72.0 TOBACCO ABUSE: ICD-10-CM

## 2022-06-07 DIAGNOSIS — E66.3 OVERWEIGHT: ICD-10-CM

## 2022-06-07 DIAGNOSIS — Z12.11 COLON CANCER SCREENING: ICD-10-CM

## 2022-06-07 DIAGNOSIS — C64.2 CARCINOMA OF LEFT KIDNEY: ICD-10-CM

## 2022-06-07 DIAGNOSIS — Z00.00 ANNUAL PHYSICAL EXAM: ICD-10-CM

## 2022-06-07 DIAGNOSIS — I10 PRIMARY HYPERTENSION: ICD-10-CM

## 2022-06-07 DIAGNOSIS — K21.9 GASTROESOPHAGEAL REFLUX DISEASE WITHOUT ESOPHAGITIS: Primary | ICD-10-CM

## 2022-06-07 PROCEDURE — 99396 PREV VISIT EST AGE 40-64: CPT | Performed by: INTERNAL MEDICINE

## 2022-06-07 PROCEDURE — 99213 OFFICE O/P EST LOW 20 MIN: CPT | Performed by: INTERNAL MEDICINE

## 2022-06-07 RX ORDER — AMLODIPINE BESYLATE 5 MG/1
5 TABLET ORAL DAILY
Qty: 39 TABLET | Refills: 1 | Status: SHIPPED | OUTPATIENT
Start: 2022-06-07 | End: 2022-07-20 | Stop reason: SDUPTHER

## 2022-06-07 NOTE — PROGRESS NOTES
Subjective   Tobias Newberry is a 56 y.o. male and is here for a comprehensive physical exam.   Patient here for physical also has medical problems to be discussed. Blood pressures elevated today patient denies any headache blurry vision dizziness. Leukocytosis resolved. Patient quit tobacco already. Creatinine was elevated. Arthritic pain stable now. Sugar elevated needs to be followed up. Patient does have family history of diabetes. Also recently discovered lung nodule. Left kidney cancer status post removal follow-up with oncologist.  Do you take any herbs or supplements that were not prescribed by a doctor? no  Are you taking calcium supplements? no  Are you taking aspirin daily? no      The following portions of the patient's history were reviewed and updated as appropriate: allergies, current medications, past family history, past medical history, past social history, past surgical history and problem list.      Review of Systems   Constitutional: Negative.    HENT: Negative.    Eyes: Negative.    Respiratory: Negative.    Cardiovascular: Negative.    Gastrointestinal: Negative.    Endocrine: Negative.    Genitourinary: Negative.    Musculoskeletal: Negative.    Skin: Negative.    Allergic/Immunologic: Negative.    Neurological: Negative.    Hematological: Negative.    Psychiatric/Behavioral: Negative.    All other systems reviewed and are negative.        Physical Exam  Constitutional:       Appearance: Normal appearance. He is well-developed.   HENT:      Head: Normocephalic.      Right Ear: Tympanic membrane, ear canal and external ear normal.      Left Ear: Tympanic membrane, ear canal and external ear normal.      Nose: Nose normal.      Mouth/Throat:      Mouth: Mucous membranes are moist.      Pharynx: Oropharynx is clear.   Eyes:      Conjunctiva/sclera: Conjunctivae normal.      Pupils: Pupils are equal, round, and reactive to light.   Cardiovascular:      Rate and Rhythm: Normal rate and  regular rhythm.      Heart sounds: Normal heart sounds.   Pulmonary:      Effort: Pulmonary effort is normal.      Breath sounds: Normal breath sounds.   Abdominal:      General: Bowel sounds are normal.      Palpations: Abdomen is soft.   Genitourinary:     Penis: Normal.       Testes: Normal.      Prostate: Normal.      Rectum: Normal.   Musculoskeletal:         General: Normal range of motion.      Cervical back: Normal range of motion and neck supple.   Skin:     General: Skin is warm.   Neurological:      General: No focal deficit present.      Mental Status: He is alert and oriented to person, place, and time. Mental status is at baseline.   Psychiatric:         Mood and Affect: Mood normal.         Behavior: Behavior normal.         Thought Content: Thought content normal.         Judgment: Judgment normal.         All  tests have been reviewed.    Assessment & Plan          1. Patient Counseling:  --Nutrition: Stressed importance of moderation in sodium/caffeine intake, saturated fat and cholesterol, caloric balance, sufficient intake of fresh fruits, vegetables, fiber, calcium and iron.  --Exercise: Stressed the importance of regular exercise.   --Injury prevention: Discussed safety belts, safety helmets, smoke detector, smoking near bedding or upholstery.   --Dental health: Discussed importance of regular tooth brushing, flossing, and dental visits.  --Immunizations reviewed.  --Discussed benefits of screening colonoscopy.  --After hours service discussed with patient    2. Discussed the patient's BMI with him.            left radical nephrectomy, thrombectomy, and adrenalectomy.   HTN increase norvasc to 5 mg qd  Leukocytosis repeat   tob quit already   Cr elevation repeat  Arthritis stable now   Hyperglycemia  Do lab  Lung nodule cont watch by onc      1 mo     GERD improved after good diet patient is not taking medication  Right thumb pain due to repetitive work.  Watch for now   Family history of  diabetes  Possible kidney cancer in the family.    Patient uses tobacco.    loss of hearing  Colon ca screen scope schedule   Borderline, BP, watch for now, need to loose weight and low salt  Hyperlipidemia, diet for now  Easy bruise mild.

## 2022-06-16 DIAGNOSIS — C64.2 CARCINOMA OF LEFT KIDNEY: Primary | ICD-10-CM

## 2022-06-17 ENCOUNTER — HOSPITAL ENCOUNTER (OUTPATIENT)
Dept: ONCOLOGY | Facility: HOSPITAL | Age: 57
Setting detail: INFUSION SERIES
Discharge: HOME | End: 2022-06-17

## 2022-06-17 ENCOUNTER — OFFICE VISIT (OUTPATIENT)
Dept: ONCOLOGY | Facility: CLINIC | Age: 57
End: 2022-06-17

## 2022-06-17 VITALS
HEART RATE: 72 BPM | HEIGHT: 71 IN | BODY MASS INDEX: 29.4 KG/M2 | WEIGHT: 210 LBS | TEMPERATURE: 98.4 F | SYSTOLIC BLOOD PRESSURE: 148 MMHG | DIASTOLIC BLOOD PRESSURE: 87 MMHG | OXYGEN SATURATION: 99 % | RESPIRATION RATE: 18 BRPM

## 2022-06-17 DIAGNOSIS — C64.2 CARCINOMA OF LEFT KIDNEY: Primary | ICD-10-CM

## 2022-06-17 LAB
ALBUMIN SERPL-MCNC: 4.1 G/DL (ref 3.5–5.2)
ALBUMIN/GLOB SERPL: 1.4 G/DL
ALP SERPL-CCNC: 81 U/L (ref 39–117)
ALT SERPL W P-5'-P-CCNC: 15 U/L (ref 1–41)
ANION GAP SERPL CALCULATED.3IONS-SCNC: 11 MMOL/L (ref 5–15)
AST SERPL-CCNC: 16 U/L (ref 1–40)
BILIRUB SERPL-MCNC: 0.2 MG/DL (ref 0–1.2)
BUN SERPL-MCNC: 20 MG/DL (ref 6–20)
BUN/CREAT SERPL: 13.1 (ref 7–25)
CALCIUM SPEC-SCNC: 9.6 MG/DL (ref 8.6–10.5)
CHLORIDE SERPL-SCNC: 102 MMOL/L (ref 98–107)
CO2 SERPL-SCNC: 26 MMOL/L (ref 22–29)
CREAT SERPL-MCNC: 1.53 MG/DL (ref 0.76–1.27)
EGFRCR SERPLBLD CKD-EPI 2021: 53 ML/MIN/1.73
ERYTHROCYTE [DISTWIDTH] IN BLOOD BY AUTOMATED COUNT: 14.6 % (ref 12.3–15.4)
GLOBULIN UR ELPH-MCNC: 2.9 GM/DL
GLUCOSE SERPL-MCNC: 118 MG/DL (ref 65–99)
HCT VFR BLD AUTO: 42.1 % (ref 37.5–51)
HGB BLD-MCNC: 13.3 G/DL (ref 13–17.7)
LYMPHOCYTES # BLD AUTO: 1.3 10*3/MM3 (ref 0.7–3.1)
LYMPHOCYTES NFR BLD AUTO: 21.6 % (ref 19.6–45.3)
MCH RBC QN AUTO: 26.9 PG (ref 26.6–33)
MCHC RBC AUTO-ENTMCNC: 31.6 G/DL (ref 31.5–35.7)
MCV RBC AUTO: 85.1 FL (ref 79–97)
MONOCYTES # BLD AUTO: 0.5 10*3/MM3 (ref 0.1–0.9)
MONOCYTES NFR BLD AUTO: 8.2 % (ref 5–12)
NEUTROPHILS NFR BLD AUTO: 4.1 10*3/MM3 (ref 1.7–7)
NEUTROPHILS NFR BLD AUTO: 70.2 % (ref 42.7–76)
PLATELET # BLD AUTO: 308 10*3/MM3 (ref 140–450)
PMV BLD AUTO: 6.4 FL (ref 6–12)
POTASSIUM SERPL-SCNC: 4.6 MMOL/L (ref 3.5–5.2)
PROT SERPL-MCNC: 7 G/DL (ref 6–8.5)
RBC # BLD AUTO: 4.94 10*6/MM3 (ref 4.14–5.8)
SODIUM SERPL-SCNC: 139 MMOL/L (ref 136–145)
T4 FREE SERPL-MCNC: 1.43 NG/DL (ref 0.93–1.7)
TSH SERPL DL<=0.05 MIU/L-ACNC: 0.89 UIU/ML (ref 0.27–4.2)
WBC NRBC COR # BLD: 5.9 10*3/MM3 (ref 3.4–10.8)

## 2022-06-17 PROCEDURE — 84439 ASSAY OF FREE THYROXINE: CPT | Performed by: INTERNAL MEDICINE

## 2022-06-17 PROCEDURE — 86803 HEPATITIS C AB TEST: CPT | Performed by: INTERNAL MEDICINE

## 2022-06-17 PROCEDURE — 96413 CHEMO IV INFUSION 1 HR: CPT

## 2022-06-17 PROCEDURE — 84443 ASSAY THYROID STIM HORMONE: CPT | Performed by: INTERNAL MEDICINE

## 2022-06-17 PROCEDURE — 84153 ASSAY OF PSA TOTAL: CPT | Performed by: INTERNAL MEDICINE

## 2022-06-17 PROCEDURE — 80061 LIPID PANEL: CPT | Performed by: INTERNAL MEDICINE

## 2022-06-17 PROCEDURE — 25010000002 PEMBROLIZUMAB 100 MG/4ML SOLUTION 4 ML VIAL: Performed by: NURSE PRACTITIONER

## 2022-06-17 PROCEDURE — 80050 GENERAL HEALTH PANEL: CPT | Performed by: INTERNAL MEDICINE

## 2022-06-17 PROCEDURE — 99214 OFFICE O/P EST MOD 30 MIN: CPT | Performed by: NURSE PRACTITIONER

## 2022-06-17 RX ORDER — SODIUM CHLORIDE 9 MG/ML
250 INJECTION, SOLUTION INTRAVENOUS ONCE
Status: COMPLETED | OUTPATIENT
Start: 2022-06-17 | End: 2022-06-17

## 2022-06-17 RX ORDER — SODIUM CHLORIDE 9 MG/ML
250 INJECTION, SOLUTION INTRAVENOUS ONCE
Status: CANCELLED | OUTPATIENT
Start: 2022-06-17

## 2022-06-17 RX ADMIN — SODIUM CHLORIDE 250 ML: 9 INJECTION, SOLUTION INTRAVENOUS at 09:42

## 2022-06-17 RX ADMIN — SODIUM CHLORIDE 200 MG: 9 INJECTION, SOLUTION INTRAVENOUS at 09:42

## 2022-06-17 NOTE — PROGRESS NOTES
DATE OF VISIT: 6/17/2022    REASON FOR VISIT: Followup for left kidney cancer     PROBLEM LIST:  1. 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  Presented with hematuria and back pain  B.  CT abdomen pelvis done January 16, 2022 revealed 6 cm left kidney mass  C.  Status post radical nephrectomy done by Dr. Obrien February 16, 2022  D.  Final pathology confirmed 6 cm, grade 2 clear cell carcinoma, invades renal vein, clear surgical margins, and no lymphovascular invasion.  E. Started adjuvant immunotherapy 3/18/2022, status post 4 cycles of treatment.    2.  Hypertension  3.  Postoperative pain  4.  Right upper lobe lung nodule, 1 cm:  A.  Incidentally noted on CT chest done January 20, 2022  5.  Family history of kidney cancer  6. Immune-therapy induced pruritis  7. POT1 gene mutation    HISTORY OF PRESENT ILLNESS: The patient is a very pleasant 56 y.o. male  with past medical history significant for left kidney cancer diagnosed February 2022.  He is status post radical left nephrectomy.  He was started on adjuvant Keytruda March 18, 2022.  The patient is here today for scheduled follow-up visit with treatment cycle #5.    SUBJECTIVE: Patient is here today by himself.  He continues to tolerate his Keytruda with minimal side effects.  He is still working full-time.  He denies skin rash, diarrhea, nausea, vomiting, or shortness of air.  He saw his primary care provider for a physical who told him to monitor his blood sugar levels.  He is planning to come in fasting for one of his visits to get a more accurate representation of his blood glucose.  He has mild occasional arthralgias for which she has used ibuprofen as needed.    Past History:  Medical History: has a past medical history of Anxiety, Cancer (HCC), Depression, Ross catheter in place (01/31/2022), Hematuria, Hypertension, PONV (postoperative nausea and vomiting), Tattoo, Tinnitus, and Wears glasses.   Surgical History: has a past surgical  "history that includes Vasectomy and Nephrectomy (Left, 2/16/2022).   Family History: family history includes Cancer in his brother and mother; Heart disease in his father and paternal uncle; Leukemia in his maternal aunt; Skin cancer in his maternal grandfather.   Social History: reports that he quit smoking about 6 years ago. He has a 6.00 pack-year smoking history. His smokeless tobacco use includes chew. He reports current alcohol use. He reports that he does not use drugs.    (Not in a hospital admission)     Allergies: Patient has no known allergies.     Review of Systems   Constitutional: Positive for fatigue.   Musculoskeletal: Positive for arthralgias.   All other systems reviewed and are negative.        Current Outpatient Medications:   •  amLODIPine (NORVASC) 5 MG tablet, Take 1 tablet by mouth Daily., Disp: 39 tablet, Rfl: 1  •  ondansetron (ZOFRAN) 8 MG tablet, Take 1 tablet by mouth 3 (Three) Times a Day As Needed for Nausea or Vomiting., Disp: 30 tablet, Rfl: 5    PHYSICAL EXAMINATION:   /87 Comment: LUE  Pulse 72   Temp 98.4 °F (36.9 °C) (Infrared)   Resp 18   Ht 180.3 cm (71\")   Wt 95.3 kg (210 lb)   SpO2 99% Comment: RA  BMI 29.29 kg/m²    Pain Score    06/17/22 0836   PainSc: 0-No pain     ECOG score: 0        ECOG Performance Status: 1 - Symptomatic but completely ambulatory  General Appearance:  alert, cooperative, no apparent distress and appears stated age   Lungs:   Clear to auscultation bilaterally; respirations regular, even, and unlabored bilaterally   Heart:  Regular rate and rhythm, no murmurs appreciated   Abdomen:   Soft, non-tender, non-distended and no organomegaly     Hospital Outpatient Visit on 06/17/2022   Component Date Value Ref Range Status   • Glucose 06/17/2022 118 (A) 65 - 99 mg/dL Final   • BUN 06/17/2022 20  6 - 20 mg/dL Final   • Creatinine 06/17/2022 1.53 (A) 0.76 - 1.27 mg/dL Final   • Sodium 06/17/2022 139  136 - 145 mmol/L Final   • Potassium 06/17/2022 " 4.6  3.5 - 5.2 mmol/L Final   • Chloride 06/17/2022 102  98 - 107 mmol/L Final   • CO2 06/17/2022 26.0  22.0 - 29.0 mmol/L Final   • Calcium 06/17/2022 9.6  8.6 - 10.5 mg/dL Final   • Total Protein 06/17/2022 7.0  6.0 - 8.5 g/dL Final   • Albumin 06/17/2022 4.10  3.50 - 5.20 g/dL Final   • ALT (SGPT) 06/17/2022 15  1 - 41 U/L Final   • AST (SGOT) 06/17/2022 16  1 - 40 U/L Final   • Alkaline Phosphatase 06/17/2022 81  39 - 117 U/L Final   • Total Bilirubin 06/17/2022 0.2  0.0 - 1.2 mg/dL Final   • Globulin 06/17/2022 2.9  gm/dL Final    Calculated Result   • A/G Ratio 06/17/2022 1.4  g/dL Final   • BUN/Creatinine Ratio 06/17/2022 13.1  7.0 - 25.0 Final   • Anion Gap 06/17/2022 11.0  5.0 - 15.0 mmol/L Final   • eGFR 06/17/2022 53.0 (A) >60.0 mL/min/1.73 Final    National Kidney Foundation and American Society of Nephrology (ASN) Task Force recommended calculation based on the Chronic Kidney Disease Epidemiology Collaboration (CKD-EPI) equation refit without adjustment for race.   • TSH 06/17/2022 0.891  0.270 - 4.200 uIU/mL Final   • Free T4 06/17/2022 1.43  0.93 - 1.70 ng/dL Final   • WBC 06/17/2022 5.90  3.40 - 10.80 10*3/mm3 Final   • RBC 06/17/2022 4.94  4.14 - 5.80 10*6/mm3 Final   • Hemoglobin 06/17/2022 13.3  13.0 - 17.7 g/dL Final   • Hematocrit 06/17/2022 42.1  37.5 - 51.0 % Final   • RDW 06/17/2022 14.6  12.3 - 15.4 % Final   • MCV 06/17/2022 85.1  79.0 - 97.0 fL Final   • MCH 06/17/2022 26.9  26.6 - 33.0 pg Final   • MCHC 06/17/2022 31.6  31.5 - 35.7 g/dL Final   • MPV 06/17/2022 6.4  6.0 - 12.0 fL Final   • Platelets 06/17/2022 308  140 - 450 10*3/mm3 Final   • Neutrophil % 06/17/2022 70.2  42.7 - 76.0 % Final   • Lymphocyte % 06/17/2022 21.6  19.6 - 45.3 % Final   • Monocyte % 06/17/2022 8.2  5.0 - 12.0 % Final   • Neutrophils, Absolute 06/17/2022 4.10  1.70 - 7.00 10*3/mm3 Final   • Lymphocytes, Absolute 06/17/2022 1.30  0.70 - 3.10 10*3/mm3 Final   • Monocytes, Absolute 06/17/2022 0.50  0.10 - 0.90  10*3/mm3 Final        No results found.    ASSESSMENT: The patient is a very pleasant 56 y.o. male  with left kidney cancer      PLAN:    1.  Left kidney cancer:  A.  I will proceed with adjuvant Keytruda 20 mg IV every 3 weeks cycle # 5 today.  B.  The patient will follow up with us in 3 weeks for cycle # 6.   C.  We will plan to finish 2 years of treatment if tolerated.  D.  I will continue to monitor the patient's blood work including blood counts, kidney function, liver functions, and electrolytes. We will also continue to monitor thyroid studies periodically.   E.  I reviewed the lab results from today with the patient.  His blood counts are normal including white blood cell count, hemoglobin, and platelet count.  His creatinine was down some to 1.53.  I encouraged him to continue efforts for drinking plenty of fluids.  His glucose on today's CMP was at 118 which was improved from 3 weeks ago.  Liver enzymes and electrolytes continue to be normal.  His thyroid functions are also within normal limits.  F. We reviewed again the potential side effects of immunotherapy including but not limited to immune mediated reactions with thyroiditis, pneumonitis, hepatitis, colitis, rash, and electrolyte abnormalities, fatigue, multiorgan failure, and possibly death.  G.  We will plan to repeat his CT scans in 6 months that will be due November 2022.     2.  Right upper lobe lung nodule:  A. The right upper lobe lung nodule was stable in size at 10 mm on most recent CT done 5/13/2022.   B. He will follow up with Dr. Neff from pulmonary.     3.  Treatment induced nausea:  A.  He will continue use of Zofran as needed for treatment induced nausea.    4.  Hypertension:  A.  I will continue Norvasc 2.5 mg daily.  B.  We will monitor the patient blood pressure while on treatment.  He would be at risk for hypotension as well as hypertension.    5. Immune-therapy induced pruritis:  A. I encouraged him to keep his skin well  moisturized. He will monitor for rash or worsening symptoms. If his itching worsens we can consider antihistamines to help.      6. POT1 Gene mutation:  A. I recommended he have annual skin exam due to higher risk for melanoma.   B. We will continue surveillance CT scans for renal cell carcinoma.   C. We will consider MRI brain in the future if he has symptoms.     7.  Elevated creatinine:  A.  We will continue to monitor his creatinine with each treatment.  It did go up last time to 1.67, however is down some today point to 1.53.  We did discuss that Keytruda can cause worsening kidney function.  We will continue to monitor this with treatment.  B.  I advised he try to avoid NSAIDs and use Tylenol as needed for mild aches and pains.    FOLLOW UP: 3 weeks with next cycle.    Yolanda Shaffer, APRN  6/17/2022

## 2022-06-18 LAB
CHOLEST SERPL-MCNC: 200 MG/DL (ref 100–199)
HCV AB S/CO SERPL IA: <0.1 S/CO RATIO (ref 0–0.9)
HDLC SERPL-MCNC: 54 MG/DL
LDLC SERPL CALC-MCNC: 136 MG/DL (ref 0–99)
PSA SERPL-MCNC: 0.5 NG/ML (ref 0–4)
TRIGL SERPL-MCNC: 52 MG/DL (ref 0–149)
TSH SERPL DL<=0.005 MIU/L-ACNC: 0.96 UIU/ML (ref 0.45–4.5)
VLDLC SERPL CALC-MCNC: 10 MG/DL (ref 5–40)

## 2022-06-21 ENCOUNTER — TELEPHONE (OUTPATIENT)
Dept: INTERNAL MEDICINE | Facility: CLINIC | Age: 57
End: 2022-06-21

## 2022-06-21 NOTE — TELEPHONE ENCOUNTER
Attempted to contact patient in regards to Open Access Colonoscopy Questionnaire; left detailed message per release asking patient to contact clinic to complete.

## 2022-06-29 NOTE — TELEPHONE ENCOUNTER
Attempted to contact patient; left detailed message per release to return clinic's call to complete questionnaire

## 2022-07-07 DIAGNOSIS — C64.2 CARCINOMA OF LEFT KIDNEY: Primary | ICD-10-CM

## 2022-07-08 ENCOUNTER — APPOINTMENT (OUTPATIENT)
Dept: ONCOLOGY | Facility: HOSPITAL | Age: 57
End: 2022-07-08

## 2022-07-08 ENCOUNTER — HOSPITAL ENCOUNTER (OUTPATIENT)
Dept: ONCOLOGY | Facility: HOSPITAL | Age: 57
Setting detail: INFUSION SERIES
Discharge: HOME | End: 2022-07-08

## 2022-07-08 ENCOUNTER — OFFICE VISIT (OUTPATIENT)
Dept: ONCOLOGY | Facility: CLINIC | Age: 57
End: 2022-07-08

## 2022-07-08 VITALS
HEIGHT: 71 IN | OXYGEN SATURATION: 98 % | BODY MASS INDEX: 28.98 KG/M2 | DIASTOLIC BLOOD PRESSURE: 84 MMHG | TEMPERATURE: 97.7 F | HEART RATE: 71 BPM | SYSTOLIC BLOOD PRESSURE: 156 MMHG | WEIGHT: 207 LBS | RESPIRATION RATE: 18 BRPM

## 2022-07-08 DIAGNOSIS — C64.2 CARCINOMA OF LEFT KIDNEY: Primary | ICD-10-CM

## 2022-07-08 LAB
ALBUMIN SERPL-MCNC: 4.5 G/DL (ref 3.5–5.2)
ALBUMIN/GLOB SERPL: 1.7 G/DL
ALP SERPL-CCNC: 91 U/L (ref 39–117)
ALT SERPL W P-5'-P-CCNC: 14 U/L (ref 1–41)
ANION GAP SERPL CALCULATED.3IONS-SCNC: 11 MMOL/L (ref 5–15)
AST SERPL-CCNC: 14 U/L (ref 1–40)
BILIRUB SERPL-MCNC: 0.4 MG/DL (ref 0–1.2)
BUN SERPL-MCNC: 20 MG/DL (ref 6–20)
BUN/CREAT SERPL: 12.5 (ref 7–25)
CALCIUM SPEC-SCNC: 9.5 MG/DL (ref 8.6–10.5)
CHLORIDE SERPL-SCNC: 99 MMOL/L (ref 98–107)
CO2 SERPL-SCNC: 26 MMOL/L (ref 22–29)
CREAT SERPL-MCNC: 1.6 MG/DL (ref 0.76–1.27)
EGFRCR SERPLBLD CKD-EPI 2021: 50.3 ML/MIN/1.73
ERYTHROCYTE [DISTWIDTH] IN BLOOD BY AUTOMATED COUNT: 14 % (ref 12.3–15.4)
GLOBULIN UR ELPH-MCNC: 2.7 GM/DL
GLUCOSE SERPL-MCNC: 98 MG/DL (ref 65–99)
HCT VFR BLD AUTO: 43.7 % (ref 37.5–51)
HGB BLD-MCNC: 13.9 G/DL (ref 13–17.7)
LYMPHOCYTES # BLD AUTO: 1.4 10*3/MM3 (ref 0.7–3.1)
LYMPHOCYTES NFR BLD AUTO: 22.2 % (ref 19.6–45.3)
MCH RBC QN AUTO: 27.4 PG (ref 26.6–33)
MCHC RBC AUTO-ENTMCNC: 31.8 G/DL (ref 31.5–35.7)
MCV RBC AUTO: 86.3 FL (ref 79–97)
MONOCYTES # BLD AUTO: 0.3 10*3/MM3 (ref 0.1–0.9)
MONOCYTES NFR BLD AUTO: 5.5 % (ref 5–12)
NEUTROPHILS NFR BLD AUTO: 4.4 10*3/MM3 (ref 1.7–7)
NEUTROPHILS NFR BLD AUTO: 72.3 % (ref 42.7–76)
PLATELET # BLD AUTO: 301 10*3/MM3 (ref 140–450)
PMV BLD AUTO: 6.4 FL (ref 6–12)
POTASSIUM SERPL-SCNC: 4.6 MMOL/L (ref 3.5–5.2)
PROT SERPL-MCNC: 7.2 G/DL (ref 6–8.5)
RBC # BLD AUTO: 5.06 10*6/MM3 (ref 4.14–5.8)
SODIUM SERPL-SCNC: 136 MMOL/L (ref 136–145)
WBC NRBC COR # BLD: 6.1 10*3/MM3 (ref 3.4–10.8)

## 2022-07-08 PROCEDURE — 99214 OFFICE O/P EST MOD 30 MIN: CPT | Performed by: NURSE PRACTITIONER

## 2022-07-08 PROCEDURE — 80053 COMPREHEN METABOLIC PANEL: CPT | Performed by: INTERNAL MEDICINE

## 2022-07-08 PROCEDURE — 85025 COMPLETE CBC W/AUTO DIFF WBC: CPT | Performed by: INTERNAL MEDICINE

## 2022-07-08 PROCEDURE — 96413 CHEMO IV INFUSION 1 HR: CPT

## 2022-07-08 PROCEDURE — 25010000002 PEMBROLIZUMAB 100 MG/4ML SOLUTION 4 ML VIAL: Performed by: NURSE PRACTITIONER

## 2022-07-08 RX ORDER — SODIUM CHLORIDE 9 MG/ML
250 INJECTION, SOLUTION INTRAVENOUS ONCE
Status: DISCONTINUED | OUTPATIENT
Start: 2022-07-08 | End: 2022-07-09 | Stop reason: HOSPADM

## 2022-07-08 RX ORDER — SODIUM CHLORIDE 9 MG/ML
250 INJECTION, SOLUTION INTRAVENOUS ONCE
Status: CANCELLED | OUTPATIENT
Start: 2022-07-08

## 2022-07-08 RX ORDER — SODIUM CHLORIDE 9 MG/ML
250 INJECTION, SOLUTION INTRAVENOUS ONCE
Status: CANCELLED | OUTPATIENT
Start: 2022-07-29

## 2022-07-08 RX ADMIN — SODIUM CHLORIDE 200 MG: 9 INJECTION, SOLUTION INTRAVENOUS at 10:47

## 2022-07-08 NOTE — PROGRESS NOTES
DATE OF VISIT: 7/8/2022    REASON FOR VISIT: Followup for left kidney cancer     PROBLEM LIST:  1. 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  Presented with hematuria and back pain  B.  CT abdomen pelvis done January 16, 2022 revealed 6 cm left kidney mass  C.  Status post radical nephrectomy done by Dr. Obrien February 16, 2022  D.  Final pathology confirmed 6 cm, grade 2 clear cell carcinoma, invades renal vein, clear surgical margins, and no lymphovascular invasion.  E. Started adjuvant immunotherapy 3/18/2022, status post 5 cycles of treatment.    2.  Hypertension  3.  Postoperative pain  4.  Right upper lobe lung nodule, 1 cm:  A.  Incidentally noted on CT chest done January 20, 2022  5.  Family history of kidney cancer  6. Immune-therapy induced pruritis  7. POT1 gene mutation    HISTORY OF PRESENT ILLNESS: The patient is a very pleasant 56 y.o. male  with past medical history significant for left kidney cancer diagnosed February 2022.  He is status post radical left nephrectomy.  He was started on adjuvant Keytruda March 18, 2022.  The patient is here today for scheduled follow-up visit with treatment cycle #6.    SUBJECTIVE: The patient is here today by himself.  He is doing well since his last visit.  He continues to tolerate treatment with very few side effects.  His energy level has continued to improve.  He denies nausea, vomiting, or diarrhea.  He denies any skin rash or itching.  He is trying to drink plenty of fluids to stay well-hydrated.    Past History:  Medical History: has a past medical history of Anxiety, Cancer (HCC), Depression, Ross catheter in place (01/31/2022), Hematuria, Hypertension, PONV (postoperative nausea and vomiting), Tattoo, Tinnitus, and Wears glasses.   Surgical History: has a past surgical history that includes Vasectomy and Nephrectomy (Left, 2/16/2022).   Family History: family history includes Cancer in his brother and mother; Heart disease in his father and  "paternal uncle; Leukemia in his maternal aunt; Skin cancer in his maternal grandfather.   Social History: reports that he quit smoking about 6 years ago. He has a 6.00 pack-year smoking history. His smokeless tobacco use includes chew. He reports current alcohol use. He reports that he does not use drugs.    (Not in a hospital admission)     Allergies: Patient has no known allergies.     Review of Systems   Constitutional: Positive for fatigue.         Current Outpatient Medications:   •  amLODIPine (NORVASC) 5 MG tablet, Take 1 tablet by mouth Daily., Disp: 39 tablet, Rfl: 1  •  ondansetron (ZOFRAN) 8 MG tablet, Take 1 tablet by mouth 3 (Three) Times a Day As Needed for Nausea or Vomiting., Disp: 30 tablet, Rfl: 5    PHYSICAL EXAMINATION:   /84   Pulse 71   Temp 97.7 °F (36.5 °C) (Temporal)   Resp 18   Ht 180.3 cm (70.98\")   Wt 93.9 kg (207 lb)   SpO2 98%   BMI 28.88 kg/m²    Pain Score    07/08/22 0949   PainSc: 0-No pain     ECOG score: 0        ECOG Performance Status: 0 - Asymptomatic  General Appearance:  alert, cooperative, no apparent distress and appears stated age   Lungs:   Clear to auscultation bilaterally; respirations regular, even, and unlabored bilaterally   Heart:  Regular rate and rhythm, no murmurs appreciated   Abdomen:   Soft, non-tender, non-distended and no organomegaly     Hospital Outpatient Visit on 07/08/2022   Component Date Value Ref Range Status   • Glucose 07/08/2022 98  65 - 99 mg/dL Final   • BUN 07/08/2022 20  6 - 20 mg/dL Final   • Creatinine 07/08/2022 1.60 (A) 0.76 - 1.27 mg/dL Final   • Sodium 07/08/2022 136  136 - 145 mmol/L Final   • Potassium 07/08/2022 4.6  3.5 - 5.2 mmol/L Final   • Chloride 07/08/2022 99  98 - 107 mmol/L Final   • CO2 07/08/2022 26.0  22.0 - 29.0 mmol/L Final   • Calcium 07/08/2022 9.5  8.6 - 10.5 mg/dL Final   • Total Protein 07/08/2022 7.2  6.0 - 8.5 g/dL Final   • Albumin 07/08/2022 4.50  3.50 - 5.20 g/dL Final   • ALT (SGPT) 07/08/2022 14  " 1 - 41 U/L Final   • AST (SGOT) 07/08/2022 14  1 - 40 U/L Final   • Alkaline Phosphatase 07/08/2022 91  39 - 117 U/L Final   • Total Bilirubin 07/08/2022 0.4  0.0 - 1.2 mg/dL Final   • Globulin 07/08/2022 2.7  gm/dL Final    Calculated Result   • A/G Ratio 07/08/2022 1.7  g/dL Final   • BUN/Creatinine Ratio 07/08/2022 12.5  7.0 - 25.0 Final   • Anion Gap 07/08/2022 11.0  5.0 - 15.0 mmol/L Final   • eGFR 07/08/2022 50.3 (A) >60.0 mL/min/1.73 Final    National Kidney Foundation and American Society of Nephrology (ASN) Task Force recommended calculation based on the Chronic Kidney Disease Epidemiology Collaboration (CKD-EPI) equation refit without adjustment for race.   • WBC 07/08/2022 6.10  3.40 - 10.80 10*3/mm3 Final   • RBC 07/08/2022 5.06  4.14 - 5.80 10*6/mm3 Final   • Hemoglobin 07/08/2022 13.9  13.0 - 17.7 g/dL Final   • Hematocrit 07/08/2022 43.7  37.5 - 51.0 % Final   • RDW 07/08/2022 14.0  12.3 - 15.4 % Final   • MCV 07/08/2022 86.3  79.0 - 97.0 fL Final   • MCH 07/08/2022 27.4  26.6 - 33.0 pg Final   • MCHC 07/08/2022 31.8  31.5 - 35.7 g/dL Final   • MPV 07/08/2022 6.4  6.0 - 12.0 fL Final   • Platelets 07/08/2022 301  140 - 450 10*3/mm3 Final   • Neutrophil % 07/08/2022 72.3  42.7 - 76.0 % Final   • Lymphocyte % 07/08/2022 22.2  19.6 - 45.3 % Final   • Monocyte % 07/08/2022 5.5  5.0 - 12.0 % Final   • Neutrophils, Absolute 07/08/2022 4.40  1.70 - 7.00 10*3/mm3 Final   • Lymphocytes, Absolute 07/08/2022 1.40  0.70 - 3.10 10*3/mm3 Final   • Monocytes, Absolute 07/08/2022 0.30  0.10 - 0.90 10*3/mm3 Final        No results found.    ASSESSMENT: The patient is a very pleasant 56 y.o. male  with left kidney cancer      PLAN:    1.  Left kidney cancer:  A.  I will proceed with adjuvant Keytruda 20 mg IV every 3 weeks cycle # 6 today.  B.  The patient will follow up with us in 3 weeks for cycle # 7.   C.  We will plan to finish 2 years of treatment if tolerated.    D.  I will continue to monitor the patient's  blood work including blood counts, kidney function, liver functions, and electrolytes. We will also continue to monitor thyroid studies periodically.   DOREEN.  I reviewed the lab results from today with the patient.   His blood counts are all within normal limits.  His creatinine from 6/17/2022 was improved to 1.53.  We will follow-up on today's chemistry profile as well as thyroid studies.  F. We reviewed again the potential side effects of immunotherapy including but not limited to immune mediated reactions with thyroiditis, pneumonitis, hepatitis, colitis, rash, and electrolyte abnormalities, fatigue, multiorgan failure, and possibly death.  G.  We will plan to repeat his CT scans in 6 months that will be due November 2022.     2.  Right upper lobe lung nodule:  A. The right upper lobe lung nodule was stable in size at 10 mm on most recent CT done 5/13/2022.   B. He will follow up with Dr. Neff from pulmonary.     3.  Treatment induced nausea:  A.  He will continue use of Zofran as needed for treatment induced nausea.    4.  Hypertension:  A.  I will continue Norvasc 5 mg daily.  B.  We will monitor the patient blood pressure while on treatment.  He would be at risk for hypotension as well as hypertension.    5. Immune-therapy induced pruritis:  A. I encouraged him to keep his skin well moisturized. He will monitor for rash or worsening symptoms.       6. POT1 Gene mutation:  A. I recommended he have annual skin exam due to higher risk for melanoma.   B. We will continue surveillance CT scans for renal cell carcinoma.   C. We will consider MRI brain in the future if he has symptoms.     7.  Elevated creatinine:  A.  We will continue to monitor his creatinine with each treatment.  His last creatinine was improved to 1.53.  We will follow-up on the results from today.  We did discuss that Keytruda can cause immune mediated nephritis.  We will continue to monitor this with each cycle of treatment.  B.  I advised he try  to avoid NSAIDs and use Tylenol as needed for mild aches and pains.    FOLLOW UP: 3 weeks with next cycle of treatment.    Yolanda Shaffer, APRN  7/8/2022

## 2022-07-17 DIAGNOSIS — Z12.11 ENCOUNTER FOR SCREENING FOR MALIGNANT NEOPLASM OF COLON: Primary | ICD-10-CM

## 2022-07-17 RX ORDER — BISACODYL 5 MG/1
20 TABLET, DELAYED RELEASE ORAL ONCE
Qty: 4 TABLET | Refills: 0 | Status: SHIPPED | OUTPATIENT
Start: 2022-07-17 | End: 2022-07-17

## 2022-07-17 RX ORDER — SODIUM CHLORIDE 9 MG/ML
70 INJECTION, SOLUTION INTRAVENOUS CONTINUOUS PRN
Status: CANCELLED | OUTPATIENT
Start: 2022-07-17

## 2022-07-20 ENCOUNTER — OFFICE VISIT (OUTPATIENT)
Dept: INTERNAL MEDICINE | Facility: CLINIC | Age: 57
End: 2022-07-20

## 2022-07-20 VITALS
BODY MASS INDEX: 29.54 KG/M2 | HEIGHT: 71 IN | HEART RATE: 75 BPM | TEMPERATURE: 97.1 F | SYSTOLIC BLOOD PRESSURE: 140 MMHG | DIASTOLIC BLOOD PRESSURE: 88 MMHG | WEIGHT: 211 LBS | OXYGEN SATURATION: 98 %

## 2022-07-20 DIAGNOSIS — K21.9 GASTROESOPHAGEAL REFLUX DISEASE WITHOUT ESOPHAGITIS: Primary | ICD-10-CM

## 2022-07-20 DIAGNOSIS — E78.5 HYPERLIPIDEMIA, UNSPECIFIED HYPERLIPIDEMIA TYPE: ICD-10-CM

## 2022-07-20 DIAGNOSIS — I10 PRIMARY HYPERTENSION: ICD-10-CM

## 2022-07-20 DIAGNOSIS — C64.2 CARCINOMA OF LEFT KIDNEY: ICD-10-CM

## 2022-07-20 DIAGNOSIS — R73.9 HYPERGLYCEMIA: ICD-10-CM

## 2022-07-20 PROBLEM — Z72.0 TOBACCO ABUSE: Status: RESOLVED | Noted: 2018-08-14 | Resolved: 2022-07-20

## 2022-07-20 PROCEDURE — 99213 OFFICE O/P EST LOW 20 MIN: CPT | Performed by: INTERNAL MEDICINE

## 2022-07-20 RX ORDER — AMLODIPINE BESYLATE 5 MG/1
5 TABLET ORAL DAILY
Qty: 90 TABLET | Refills: 3 | Status: SHIPPED | OUTPATIENT
Start: 2022-07-20 | End: 2023-01-23 | Stop reason: SDUPTHER

## 2022-07-20 NOTE — PROGRESS NOTES
Subjective   Tobias Newberry is a 56 y.o. male.     Chief Complaint   Patient presents with   • Follow-up     Recent lab results   • Hypertension       History of Present Illness   Patient here for follow-up. GerD stable medication. Hypertension stable medication. Carcinoma of the left kidney patient is receiving immunotherapy. Blood test that showed the cholesterol moderately elevated and the sugar also mildly elevated.    Current Outpatient Medications:   •  amLODIPine (NORVASC) 5 MG tablet, Take 1 tablet by mouth Daily., Disp: 90 tablet, Rfl: 3    The following portions of the patient's history were reviewed and updated as appropriate: allergies, current medications, past family history, past medical history, past social history, past surgical history and problem list.    Review of Systems   Constitutional: Negative.    Respiratory: Negative.    Cardiovascular: Negative.    Gastrointestinal: Negative.    Musculoskeletal: Negative.    Skin: Negative.    Neurological: Negative.    Psychiatric/Behavioral: Negative.        Objective   Physical Exam  Cardiovascular:      Rate and Rhythm: Normal rate and regular rhythm.      Heart sounds: Normal heart sounds.   Pulmonary:      Effort: Pulmonary effort is normal.      Breath sounds: Normal breath sounds.   Abdominal:      General: Bowel sounds are normal.   Musculoskeletal:      Cervical back: Neck supple.   Skin:     General: Skin is warm.   Neurological:      Mental Status: He is alert and oriented to person, place, and time.         All tests have been reviewed.    Assessment & Plan   Diagnoses and all orders for this visit:    Gastroesophageal reflux disease without esophagitis continue good diet    Primary hypertension stable medication continue  -     amLODIPine (NORVASC) 5 MG tablet; Take 1 tablet by mouth Daily.    Carcinoma of left kidney (HCC) continue immunotherapy    Hyperlipidemia, unspecified hyperlipidemia type recommended good diet    Hyperglycemia  good diet needed      6 mo           left radical nephrectomy, thrombectomy, and adrenalectomy.   HTN increase norvasc to 5 mg qd  tob quit already   Cr elevation   Arthritis stable now   Hyperglycemia  Do lab  Lung nodule cont watch by onc    GERD improved after good diet patient is not taking medication  Right thumb pain due to repetitive work.  Watch for now   Family history of diabetes  Possible kidney cancer in the family.    Patient uses tobacco.    loss of hearing  Colon ca screen scope schedule   Borderline, BP, watch for now, need to loose weight and low salt  Hyperlipidemia, diet for now  Easy bruise mild.

## 2022-07-29 ENCOUNTER — OFFICE VISIT (OUTPATIENT)
Dept: ONCOLOGY | Facility: CLINIC | Age: 57
End: 2022-07-29

## 2022-07-29 ENCOUNTER — HOSPITAL ENCOUNTER (OUTPATIENT)
Dept: ONCOLOGY | Facility: HOSPITAL | Age: 57
Setting detail: INFUSION SERIES
Discharge: HOME | End: 2022-07-29

## 2022-07-29 VITALS
RESPIRATION RATE: 18 BRPM | WEIGHT: 208 LBS | HEART RATE: 80 BPM | BODY MASS INDEX: 29.12 KG/M2 | OXYGEN SATURATION: 98 % | TEMPERATURE: 98.2 F | SYSTOLIC BLOOD PRESSURE: 142 MMHG | DIASTOLIC BLOOD PRESSURE: 90 MMHG | HEIGHT: 71 IN

## 2022-07-29 DIAGNOSIS — C64.2 CARCINOMA OF LEFT KIDNEY: Primary | ICD-10-CM

## 2022-07-29 LAB
ALBUMIN SERPL-MCNC: 4.3 G/DL (ref 3.5–5.2)
ALBUMIN/GLOB SERPL: 1.4 G/DL
ALP SERPL-CCNC: 83 U/L (ref 39–117)
ALT SERPL W P-5'-P-CCNC: 13 U/L (ref 1–41)
ANION GAP SERPL CALCULATED.3IONS-SCNC: 11 MMOL/L (ref 5–15)
AST SERPL-CCNC: 15 U/L (ref 1–40)
BILIRUB SERPL-MCNC: 0.4 MG/DL (ref 0–1.2)
BUN SERPL-MCNC: 23 MG/DL (ref 6–20)
BUN/CREAT SERPL: 12.8 (ref 7–25)
CALCIUM SPEC-SCNC: 9.5 MG/DL (ref 8.6–10.5)
CHLORIDE SERPL-SCNC: 97 MMOL/L (ref 98–107)
CO2 SERPL-SCNC: 24 MMOL/L (ref 22–29)
CREAT SERPL-MCNC: 1.79 MG/DL (ref 0.76–1.27)
EGFRCR SERPLBLD CKD-EPI 2021: 43.9 ML/MIN/1.73
ERYTHROCYTE [DISTWIDTH] IN BLOOD BY AUTOMATED COUNT: 13.7 % (ref 12.3–15.4)
GLOBULIN UR ELPH-MCNC: 3.1 GM/DL
GLUCOSE SERPL-MCNC: 116 MG/DL (ref 65–99)
HCT VFR BLD AUTO: 44.6 % (ref 37.5–51)
HGB BLD-MCNC: 14.3 G/DL (ref 13–17.7)
LYMPHOCYTES # BLD AUTO: 1.7 10*3/MM3 (ref 0.7–3.1)
LYMPHOCYTES NFR BLD AUTO: 23.3 % (ref 19.6–45.3)
MCH RBC QN AUTO: 27.8 PG (ref 26.6–33)
MCHC RBC AUTO-ENTMCNC: 32 G/DL (ref 31.5–35.7)
MCV RBC AUTO: 86.7 FL (ref 79–97)
MONOCYTES # BLD AUTO: 0.3 10*3/MM3 (ref 0.1–0.9)
MONOCYTES NFR BLD AUTO: 4.7 % (ref 5–12)
NEUTROPHILS NFR BLD AUTO: 5.3 10*3/MM3 (ref 1.7–7)
NEUTROPHILS NFR BLD AUTO: 72 % (ref 42.7–76)
PLATELET # BLD AUTO: 329 10*3/MM3 (ref 140–450)
PMV BLD AUTO: 6.4 FL (ref 6–12)
POTASSIUM SERPL-SCNC: 4.7 MMOL/L (ref 3.5–5.2)
PROT SERPL-MCNC: 7.4 G/DL (ref 6–8.5)
RBC # BLD AUTO: 5.14 10*6/MM3 (ref 4.14–5.8)
SODIUM SERPL-SCNC: 132 MMOL/L (ref 136–145)
T4 FREE SERPL-MCNC: 1.45 NG/DL (ref 0.93–1.7)
TSH SERPL DL<=0.05 MIU/L-ACNC: 1.02 UIU/ML (ref 0.27–4.2)
WBC NRBC COR # BLD: 7.3 10*3/MM3 (ref 3.4–10.8)

## 2022-07-29 PROCEDURE — 96413 CHEMO IV INFUSION 1 HR: CPT

## 2022-07-29 PROCEDURE — 99214 OFFICE O/P EST MOD 30 MIN: CPT | Performed by: NURSE PRACTITIONER

## 2022-07-29 PROCEDURE — 25010000002 PEMBROLIZUMAB 100 MG/4ML SOLUTION 4 ML VIAL: Performed by: NURSE PRACTITIONER

## 2022-07-29 PROCEDURE — 80050 GENERAL HEALTH PANEL: CPT | Performed by: NURSE PRACTITIONER

## 2022-07-29 PROCEDURE — 84439 ASSAY OF FREE THYROXINE: CPT | Performed by: NURSE PRACTITIONER

## 2022-07-29 RX ADMIN — SODIUM CHLORIDE 200 MG: 9 INJECTION, SOLUTION INTRAVENOUS at 09:33

## 2022-07-29 NOTE — PROGRESS NOTES
DATE OF VISIT: 7/29/2022    REASON FOR VISIT: Followup for left kidney cancer     PROBLEM LIST:  1. 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  Presented with hematuria and back pain  B.  CT abdomen pelvis done January 16, 2022 revealed 6 cm left kidney mass  C.  Status post radical nephrectomy done by Dr. Obrien February 16, 2022  D.  Final pathology confirmed 6 cm, grade 2 clear cell carcinoma, invades renal vein, clear surgical margins, and no lymphovascular invasion.  E. Started adjuvant immunotherapy 3/18/2022, status post 5 cycles of treatment.    2.  Hypertension  3.  Postoperative pain  4.  Right upper lobe lung nodule, 1 cm:  A.  Incidentally noted on CT chest done January 20, 2022  5.  Family history of kidney cancer  6. Immune-therapy induced pruritis  7. POT1 gene mutation    HISTORY OF PRESENT ILLNESS: The patient is a very pleasant 56 y.o. male  with past medical history significant for left kidney cancer diagnosed February 2022.  He is status post radical left nephrectomy.  He was started on adjuvant Keytruda March 18, 2022.  The patient is here today for scheduled follow-up visit with treatment cycle #7.    SUBJECTIVE: The patient is here today by himself.  He continues to tolerate treatment with very few side effects.  He remains very active and is working full-time.  He denies nausea vomiting or diarrhea.  He denies any skin rash or itching.    Past History:  Medical History: has a past medical history of Anxiety, Cancer (HCC), Depression, Ross catheter in place (01/31/2022), Hematuria, Hypertension, PONV (postoperative nausea and vomiting), Tattoo, Tinnitus, and Wears glasses.   Surgical History: has a past surgical history that includes Vasectomy and Nephrectomy (Left, 2/16/2022).   Family History: family history includes Cancer in his brother and mother; Heart disease in his father and paternal uncle; Leukemia in his maternal aunt; Skin cancer in his maternal grandfather.   Social  "History: reports that he quit smoking about 6 years ago. He has a 6.00 pack-year smoking history. His smokeless tobacco use includes chew. He reports current alcohol use. He reports that he does not use drugs.    (Not in a hospital admission)     Allergies: Patient has no known allergies.     Review of Systems   Constitutional: Negative for appetite change, fatigue, fever and unexpected weight change.   HENT: Negative for mouth sores, sore throat and trouble swallowing.    Respiratory: Negative for cough, shortness of breath and wheezing.    Cardiovascular: Negative for chest pain, palpitations and leg swelling.   Gastrointestinal: Negative for abdominal distention, abdominal pain, constipation, diarrhea, nausea and vomiting.   Genitourinary: Negative for difficulty urinating, dysuria and frequency.   Musculoskeletal: Negative for arthralgias.   Skin: Negative for pallor, rash and wound.   Neurological: Negative for dizziness and weakness.   Hematological: Does not bruise/bleed easily.   Psychiatric/Behavioral: Negative for confusion and sleep disturbance. The patient is not nervous/anxious.          Current Outpatient Medications:   •  amLODIPine (NORVASC) 5 MG tablet, Take 1 tablet by mouth Daily., Disp: 90 tablet, Rfl: 3    PHYSICAL EXAMINATION:   /90 Comment: RUE  Pulse 80   Temp 98.2 °F (36.8 °C) (Infrared)   Resp 18   Ht 180.3 cm (71\")   Wt 94.3 kg (208 lb)   SpO2 98% Comment: RA  BMI 29.01 kg/m²    Pain Score    07/29/22 0804   PainSc: 0-No pain     ECOG score: 0        ECOG Performance Status: 0 - Asymptomatic  General Appearance:  alert, cooperative, no apparent distress and appears stated age   Lungs:   Clear to auscultation bilaterally; respirations regular, even, and unlabored bilaterally   Heart:  Regular rate and rhythm, no murmurs appreciated   Abdomen:   Soft, non-tender, non-distended and no organomegaly     Hospital Outpatient Visit on 07/29/2022   Component Date Value Ref Range Status "   • WBC 07/29/2022 7.30  3.40 - 10.80 10*3/mm3 Final   • RBC 07/29/2022 5.14  4.14 - 5.80 10*6/mm3 Final   • Hemoglobin 07/29/2022 14.3  13.0 - 17.7 g/dL Final   • Hematocrit 07/29/2022 44.6  37.5 - 51.0 % Final   • RDW 07/29/2022 13.7  12.3 - 15.4 % Final   • MCV 07/29/2022 86.7  79.0 - 97.0 fL Final   • MCH 07/29/2022 27.8  26.6 - 33.0 pg Final   • MCHC 07/29/2022 32.0  31.5 - 35.7 g/dL Final   • MPV 07/29/2022 6.4  6.0 - 12.0 fL Final   • Platelets 07/29/2022 329  140 - 450 10*3/mm3 Final   • Neutrophil % 07/29/2022 72.0  42.7 - 76.0 % Final   • Lymphocyte % 07/29/2022 23.3  19.6 - 45.3 % Final   • Monocyte % 07/29/2022 4.7 (A) 5.0 - 12.0 % Final   • Neutrophils, Absolute 07/29/2022 5.30  1.70 - 7.00 10*3/mm3 Final   • Lymphocytes, Absolute 07/29/2022 1.70  0.70 - 3.10 10*3/mm3 Final   • Monocytes, Absolute 07/29/2022 0.30  0.10 - 0.90 10*3/mm3 Final        No results found.    ASSESSMENT: The patient is a very pleasant 56 y.o. male  with left kidney cancer      PLAN:    1.  Left kidney cancer:  A.  I will proceed with adjuvant Keytruda 20 mg IV every 3 weeks cycle # 7 today.  B.  The patient will follow up with us in 3 weeks for cycle # 8.   C.  We will plan to finish 2 years of treatment if tolerated.    D.  I will continue to monitor the patient's blood work including blood counts, kidney function, liver functions, and electrolytes. We will also continue to monitor thyroid studies periodically.   E.  I reviewed the lab results from today with the patient.   His blood counts are all within normal limits.  CMP pending today.  His creatinine from 7/8/2022 was stable at 1.60.  We will follow-up on today's chemistry profile as well as thyroid studies.  F. We reviewed again the potential side effects of immunotherapy including but not limited to immune mediated reactions with thyroiditis, pneumonitis, hepatitis, colitis, rash, and electrolyte abnormalities, fatigue, multiorgan failure, and possibly death.  G.  We  will plan to repeat his CT scans in 6 months that will be due November 2022.     2.  Right upper lobe lung nodule:  A. The right upper lobe lung nodule was stable in size at 10 mm on most recent CT done 5/13/2022.   B. He will follow up with Dr. Neff from pulmonary.     3.  Treatment induced nausea:  A.  He will continue use of Zofran as needed for treatment induced nausea.    4.  Hypertension:  A.  I will continue Norvasc 5 mg daily.  B.  We will monitor the patient blood pressure while on treatment.  He would be at risk for hypotension as well as hypertension.    5. Immune-therapy induced pruritis:  A. I encouraged him to keep his skin well moisturized. He will monitor for rash or worsening symptoms.       6. POT1 Gene mutation:  A. I recommended he have annual skin exam due to higher risk for melanoma.   B. We will continue surveillance CT scans for renal cell carcinoma.   C. We will consider MRI brain in the future if he has symptoms.     7.  Elevated creatinine:  A.  We will continue to monitor his creatinine with each treatment.  His last creatinine was stable at 1.60.  We will follow-up on the results from today.  We did discuss that Keytruda can cause immune mediated nephritis.  We will continue to monitor this with each cycle of treatment.  B.  I advised he try to avoid NSAIDs and use Tylenol as needed for mild aches and pains.    FOLLOW UP: 3 weeks with next cycle of treatment.    Sherita Restrepo, APRN  7/29/2022

## 2022-08-19 ENCOUNTER — HOSPITAL ENCOUNTER (OUTPATIENT)
Dept: ONCOLOGY | Facility: HOSPITAL | Age: 57
Setting detail: INFUSION SERIES
Discharge: HOME | End: 2022-08-19

## 2022-08-19 ENCOUNTER — OFFICE VISIT (OUTPATIENT)
Dept: ONCOLOGY | Facility: CLINIC | Age: 57
End: 2022-08-19

## 2022-08-19 VITALS
TEMPERATURE: 97.3 F | HEART RATE: 85 BPM | HEIGHT: 71 IN | OXYGEN SATURATION: 99 % | RESPIRATION RATE: 18 BRPM | WEIGHT: 210 LBS | DIASTOLIC BLOOD PRESSURE: 88 MMHG | BODY MASS INDEX: 29.4 KG/M2 | SYSTOLIC BLOOD PRESSURE: 184 MMHG

## 2022-08-19 DIAGNOSIS — C64.2 CARCINOMA OF LEFT KIDNEY: Primary | ICD-10-CM

## 2022-08-19 LAB
ALBUMIN SERPL-MCNC: 4.4 G/DL (ref 3.5–5.2)
ALBUMIN/GLOB SERPL: 1.3 G/DL
ALP SERPL-CCNC: 84 U/L (ref 39–117)
ALT SERPL W P-5'-P-CCNC: 14 U/L (ref 1–41)
ANION GAP SERPL CALCULATED.3IONS-SCNC: 13 MMOL/L (ref 5–15)
AST SERPL-CCNC: 19 U/L (ref 1–40)
BASOPHILS # BLD AUTO: 0.03 10*3/MM3 (ref 0–0.2)
BASOPHILS NFR BLD AUTO: 0.5 % (ref 0–1.5)
BILIRUB SERPL-MCNC: 0.4 MG/DL (ref 0–1.2)
BUN SERPL-MCNC: 20 MG/DL (ref 6–20)
BUN/CREAT SERPL: 11.5 (ref 7–25)
CALCIUM SPEC-SCNC: 9.6 MG/DL (ref 8.6–10.5)
CHLORIDE SERPL-SCNC: 101 MMOL/L (ref 98–107)
CO2 SERPL-SCNC: 25 MMOL/L (ref 22–29)
CREAT SERPL-MCNC: 1.74 MG/DL (ref 0.76–1.27)
DEPRECATED RDW RBC AUTO: 42.8 FL (ref 37–54)
EGFRCR SERPLBLD CKD-EPI 2021: 45.4 ML/MIN/1.73
EOSINOPHIL # BLD AUTO: 0.14 10*3/MM3 (ref 0–0.4)
EOSINOPHIL NFR BLD AUTO: 2.2 % (ref 0.3–6.2)
ERYTHROCYTE [DISTWIDTH] IN BLOOD BY AUTOMATED COUNT: 13.4 % (ref 12.3–15.4)
GLOBULIN UR ELPH-MCNC: 3.3 GM/DL
GLUCOSE SERPL-MCNC: 112 MG/DL (ref 65–99)
HCT VFR BLD AUTO: 41.7 % (ref 37.5–51)
HGB BLD-MCNC: 14.5 G/DL (ref 13–17.7)
IMM GRANULOCYTES # BLD AUTO: 0.01 10*3/MM3 (ref 0–0.05)
IMM GRANULOCYTES NFR BLD AUTO: 0.2 % (ref 0–0.5)
LYMPHOCYTES # BLD AUTO: 1.43 10*3/MM3 (ref 0.7–3.1)
LYMPHOCYTES NFR BLD AUTO: 22.3 % (ref 19.6–45.3)
MCH RBC QN AUTO: 30 PG (ref 26.6–33)
MCHC RBC AUTO-ENTMCNC: 34.8 G/DL (ref 31.5–35.7)
MCV RBC AUTO: 86.2 FL (ref 79–97)
MONOCYTES # BLD AUTO: 0.62 10*3/MM3 (ref 0.1–0.9)
MONOCYTES NFR BLD AUTO: 9.7 % (ref 5–12)
NEUTROPHILS NFR BLD AUTO: 4.17 10*3/MM3 (ref 1.7–7)
NEUTROPHILS NFR BLD AUTO: 65.1 % (ref 42.7–76)
PLATELET # BLD AUTO: 248 10*3/MM3 (ref 140–450)
PMV BLD AUTO: 8.6 FL (ref 6–12)
POTASSIUM SERPL-SCNC: 5.1 MMOL/L (ref 3.5–5.2)
PROT SERPL-MCNC: 7.7 G/DL (ref 6–8.5)
RBC # BLD AUTO: 4.84 10*6/MM3 (ref 4.14–5.8)
SODIUM SERPL-SCNC: 139 MMOL/L (ref 136–145)
WBC NRBC COR # BLD: 6.4 10*3/MM3 (ref 3.4–10.8)

## 2022-08-19 PROCEDURE — 85025 COMPLETE CBC W/AUTO DIFF WBC: CPT | Performed by: NURSE PRACTITIONER

## 2022-08-19 PROCEDURE — 25010000002 PEMBROLIZUMAB 100 MG/4ML SOLUTION 4 ML VIAL: Performed by: INTERNAL MEDICINE

## 2022-08-19 PROCEDURE — 80053 COMPREHEN METABOLIC PANEL: CPT | Performed by: NURSE PRACTITIONER

## 2022-08-19 PROCEDURE — 96413 CHEMO IV INFUSION 1 HR: CPT

## 2022-08-19 PROCEDURE — 99214 OFFICE O/P EST MOD 30 MIN: CPT | Performed by: INTERNAL MEDICINE

## 2022-08-19 RX ORDER — SODIUM CHLORIDE 9 MG/ML
250 INJECTION, SOLUTION INTRAVENOUS ONCE
Status: CANCELLED | OUTPATIENT
Start: 2022-08-19

## 2022-08-19 RX ORDER — SODIUM CHLORIDE 9 MG/ML
250 INJECTION, SOLUTION INTRAVENOUS ONCE
Status: CANCELLED | OUTPATIENT
Start: 2022-09-09

## 2022-08-19 RX ORDER — SODIUM CHLORIDE 9 MG/ML
250 INJECTION, SOLUTION INTRAVENOUS ONCE
Status: DISCONTINUED | OUTPATIENT
Start: 2022-08-19 | End: 2022-08-20 | Stop reason: HOSPADM

## 2022-08-19 RX ADMIN — SODIUM CHLORIDE 200 MG: 9 INJECTION, SOLUTION INTRAVENOUS at 10:26

## 2022-08-19 NOTE — PROGRESS NOTES
DATE OF VISIT: 8/19/2022    REASON FOR VISIT: Followup for left kidney cancer     PROBLEM LIST:  1. 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  Presented with hematuria and back pain  B.  CT abdomen pelvis done January 16, 2022 revealed 6 cm left kidney mass  C.  Status post radical nephrectomy done by Dr. Obrien February 16, 2022  D.  Final pathology confirmed 6 cm, grade 2 clear cell carcinoma, invades renal vein, clear surgical margins, and no lymphovascular invasion.  E. Started adjuvant immunotherapy 3/18/2022, status post 7 cycles of treatment.    2.  Hypertension  3.  Postoperative pain  4.  Right upper lobe lung nodule, 1 cm:  A.  Incidentally noted on CT chest done January 20, 2022  5.  Family history of kidney cancer  6. Immune-therapy induced pruritis  7. POT1 gene mutation    HISTORY OF PRESENT ILLNESS: The patient is a very pleasant 56 y.o. male  with past medical history significant for left kidney cancer diagnosed February 2022.  He is status post radical left nephrectomy.  He was started on adjuvant Keytruda March 18, 2022.  The patient is here today for scheduled follow-up visit with treatment cycle #8.    SUBJECTIVE: The patient is here today with his wife.  He has been trying to drink more water.  He does not believe he keep himself well-hydrated.  No abdominal pain no weight loss.    Past History:  Medical History: has a past medical history of Anxiety, Cancer (HCC), Depression, Ross catheter in place (01/31/2022), Hematuria, Hypertension, PONV (postoperative nausea and vomiting), Tattoo, Tinnitus, and Wears glasses.   Surgical History: has a past surgical history that includes Vasectomy and Nephrectomy (Left, 2/16/2022).   Family History: family history includes Cancer in his brother and mother; Heart disease in his father and paternal uncle; Leukemia in his maternal aunt; Skin cancer in his maternal grandfather.   Social History: reports that he quit smoking about 6 years ago. He  "has a 6.00 pack-year smoking history. His smokeless tobacco use includes chew. He reports current alcohol use. He reports that he does not use drugs.    (Not in a hospital admission)     Allergies: Patient has no known allergies.     Review of Systems   Constitutional: Positive for fatigue. Negative for appetite change, fever and unexpected weight change.   HENT: Negative for mouth sores, sore throat and trouble swallowing.    Respiratory: Negative.  Negative for cough, shortness of breath and wheezing.    Cardiovascular: Negative.  Negative for chest pain, palpitations and leg swelling.   Gastrointestinal: Negative.  Negative for abdominal distention, abdominal pain, constipation, diarrhea, nausea and vomiting.   Genitourinary: Negative for difficulty urinating, dysuria and frequency.   Musculoskeletal: Negative for arthralgias.   Skin: Negative for pallor, rash and wound.   Neurological: Negative for dizziness and weakness.   Hematological: Does not bruise/bleed easily.   Psychiatric/Behavioral: Negative for confusion and sleep disturbance. The patient is not nervous/anxious.          Current Outpatient Medications:   •  amLODIPine (NORVASC) 5 MG tablet, Take 1 tablet by mouth Daily., Disp: 90 tablet, Rfl: 3    PHYSICAL EXAMINATION:   BP (!) 184/88 Comment: RUE  Pulse 85   Temp 97.3 °F (36.3 °C) (Temporal)   Resp 18   Ht 180.3 cm (70.98\")   Wt 95.3 kg (210 lb)   SpO2 99%   BMI 29.30 kg/m²    Pain Score    08/19/22 0905   PainSc: 0-No pain     ECOG score: 0        ECOG Performance Status: 1 - Symptomatic but completely ambulatory  General Appearance:  alert, cooperative, no apparent distress and appears stated age   Lungs:   Clear to auscultation bilaterally; respirations regular, even, and unlabored bilaterally   Heart:  Regular rate and rhythm, no murmurs appreciated   Abdomen:   Soft, non-tender, non-distended and no organomegaly     Hospital Outpatient Visit on 08/19/2022   Component Date Value Ref Range " Status   • WBC 08/19/2022 6.40  3.40 - 10.80 10*3/mm3 Final   • RBC 08/19/2022 4.84  4.14 - 5.80 10*6/mm3 Final   • Hemoglobin 08/19/2022 14.5  13.0 - 17.7 g/dL Final   • Hematocrit 08/19/2022 41.7  37.5 - 51.0 % Final   • MCV 08/19/2022 86.2  79.0 - 97.0 fL Final   • MCH 08/19/2022 30.0  26.6 - 33.0 pg Final   • MCHC 08/19/2022 34.8  31.5 - 35.7 g/dL Final   • RDW 08/19/2022 13.4  12.3 - 15.4 % Final   • RDW-SD 08/19/2022 42.8  37.0 - 54.0 fl Final   • MPV 08/19/2022 8.6  6.0 - 12.0 fL Final   • Platelets 08/19/2022 248  140 - 450 10*3/mm3 Final   • Neutrophil % 08/19/2022 65.1  42.7 - 76.0 % Final   • Lymphocyte % 08/19/2022 22.3  19.6 - 45.3 % Final   • Monocyte % 08/19/2022 9.7  5.0 - 12.0 % Final   • Eosinophil % 08/19/2022 2.2  0.3 - 6.2 % Final   • Basophil % 08/19/2022 0.5  0.0 - 1.5 % Final   • Immature Grans % 08/19/2022 0.2  0.0 - 0.5 % Final   • Neutrophils, Absolute 08/19/2022 4.17  1.70 - 7.00 10*3/mm3 Final   • Lymphocytes, Absolute 08/19/2022 1.43  0.70 - 3.10 10*3/mm3 Final   • Monocytes, Absolute 08/19/2022 0.62  0.10 - 0.90 10*3/mm3 Final   • Eosinophils, Absolute 08/19/2022 0.14  0.00 - 0.40 10*3/mm3 Final   • Basophils, Absolute 08/19/2022 0.03  0.00 - 0.20 10*3/mm3 Final   • Immature Grans, Absolute 08/19/2022 0.01  0.00 - 0.05 10*3/mm3 Final        No results found.    ASSESSMENT: The patient is a very pleasant 56 y.o. male  with left kidney cancer      PLAN:    1.  Left kidney cancer:  A.  I will proceed with adjuvant Keytruda 20 mg IV every 3 weeks cycle # 8 today.  B.  The patient will follow up with us in 3 weeks for cycle # 9.   C.  We will plan to finish 2 years of treatment if tolerated.    D.  I will continue to monitor the patient's blood work including blood counts, kidney function, liver functions, and electrolytes. We will also continue to monitor thyroid studies periodically.   E.  I did go over his CBC result from today I explained to the patient that his hemoglobin is normal  14.5 with normal white cells and platelets.  I will follow-up on his CMP.  F. We reviewed again the potential side effects of immunotherapy including but not limited to immune mediated reactions with thyroiditis, pneumonitis, hepatitis, colitis, rash, and electrolyte abnormalities, fatigue, multiorgan failure, and possibly death.  G.  We will plan to repeat his CT scans in 6 months that will be due November 2022.     2.  Right upper lobe lung nodule:  A. The right upper lobe lung nodule was stable in size at 10 mm on most recent CT done 5/13/2022.   B. He will follow up with Dr. Neff from pulmonary.     3.  Treatment induced nausea:  A.  He will continue use of Zofran as needed for treatment induced nausea.    4.  Hypertension:  A.  I will continue Norvasc 5 mg daily.  B.  We will monitor the patient blood pressure while on treatment.  He would be at risk for hypotension as well as hypertension.    5. Immune-therapy induced pruritis:  A. I encouraged him to keep his skin well moisturized. He will monitor for rash or worsening symptoms.       6. POT1 Gene mutation:  A. I recommended he have annual skin exam due to higher risk for melanoma.   B. We will continue surveillance CT scans for renal cell carcinoma.   C. We will consider MRI brain in the future if he has symptoms.     7.  Elevated creatinine:  A.  I will continue monitor his creatinine closely unfortunate this has been gradually increasing his last creatinine increased to 1.8 from July 29, 2022 per  B.  The patient was advised to increase fluid intake.  C.  We will avoid nephrotoxic agents for  D.  If his creatinine continues to increase we may have to hold treatment administer steroids for possibility of autoimmune nephritis.    FOLLOW UP: 3 weeks with next cycle of treatment.    Garrett Pizano MD  8/19/2022

## 2022-09-09 ENCOUNTER — OFFICE VISIT (OUTPATIENT)
Dept: ONCOLOGY | Facility: CLINIC | Age: 57
End: 2022-09-09

## 2022-09-09 ENCOUNTER — HOSPITAL ENCOUNTER (OUTPATIENT)
Dept: ONCOLOGY | Facility: HOSPITAL | Age: 57
Setting detail: INFUSION SERIES
Discharge: HOME | End: 2022-09-09

## 2022-09-09 ENCOUNTER — APPOINTMENT (OUTPATIENT)
Dept: ONCOLOGY | Facility: HOSPITAL | Age: 57
End: 2022-09-09

## 2022-09-09 VITALS
TEMPERATURE: 97.7 F | OXYGEN SATURATION: 98 % | HEART RATE: 67 BPM | WEIGHT: 209 LBS | SYSTOLIC BLOOD PRESSURE: 142 MMHG | RESPIRATION RATE: 18 BRPM | BODY MASS INDEX: 29.26 KG/M2 | DIASTOLIC BLOOD PRESSURE: 84 MMHG | HEIGHT: 71 IN

## 2022-09-09 DIAGNOSIS — C64.2 CARCINOMA OF LEFT KIDNEY: Primary | ICD-10-CM

## 2022-09-09 LAB
ALBUMIN SERPL-MCNC: 4.2 G/DL (ref 3.5–5.2)
ALBUMIN/GLOB SERPL: 1.4 G/DL
ALP SERPL-CCNC: 83 U/L (ref 39–117)
ALT SERPL W P-5'-P-CCNC: 11 U/L (ref 1–41)
ANION GAP SERPL CALCULATED.3IONS-SCNC: 12 MMOL/L (ref 5–15)
AST SERPL-CCNC: 15 U/L (ref 1–40)
BASOPHILS # BLD AUTO: 0.03 10*3/MM3 (ref 0–0.2)
BASOPHILS NFR BLD AUTO: 0.4 % (ref 0–1.5)
BILIRUB SERPL-MCNC: 0.4 MG/DL (ref 0–1.2)
BUN SERPL-MCNC: 16 MG/DL (ref 6–20)
BUN/CREAT SERPL: 9.9 (ref 7–25)
CALCIUM SPEC-SCNC: 9.7 MG/DL (ref 8.6–10.5)
CHLORIDE SERPL-SCNC: 101 MMOL/L (ref 98–107)
CO2 SERPL-SCNC: 25 MMOL/L (ref 22–29)
CREAT SERPL-MCNC: 1.62 MG/DL (ref 0.76–1.27)
DEPRECATED RDW RBC AUTO: 41 FL (ref 37–54)
EGFRCR SERPLBLD CKD-EPI 2021: 49.2 ML/MIN/1.73
EOSINOPHIL # BLD AUTO: 0.15 10*3/MM3 (ref 0–0.4)
EOSINOPHIL NFR BLD AUTO: 1.9 % (ref 0.3–6.2)
ERYTHROCYTE [DISTWIDTH] IN BLOOD BY AUTOMATED COUNT: 12.8 % (ref 12.3–15.4)
GLOBULIN UR ELPH-MCNC: 2.9 GM/DL
GLUCOSE SERPL-MCNC: 118 MG/DL (ref 65–99)
HCT VFR BLD AUTO: 42 % (ref 37.5–51)
HGB BLD-MCNC: 14.6 G/DL (ref 13–17.7)
IMM GRANULOCYTES # BLD AUTO: 0.01 10*3/MM3 (ref 0–0.05)
IMM GRANULOCYTES NFR BLD AUTO: 0.1 % (ref 0–0.5)
LYMPHOCYTES # BLD AUTO: 1.07 10*3/MM3 (ref 0.7–3.1)
LYMPHOCYTES NFR BLD AUTO: 13.8 % (ref 19.6–45.3)
MCH RBC QN AUTO: 30 PG (ref 26.6–33)
MCHC RBC AUTO-ENTMCNC: 34.8 G/DL (ref 31.5–35.7)
MCV RBC AUTO: 86.2 FL (ref 79–97)
MONOCYTES # BLD AUTO: 0.72 10*3/MM3 (ref 0.1–0.9)
MONOCYTES NFR BLD AUTO: 9.3 % (ref 5–12)
NEUTROPHILS NFR BLD AUTO: 5.76 10*3/MM3 (ref 1.7–7)
NEUTROPHILS NFR BLD AUTO: 74.5 % (ref 42.7–76)
PLATELET # BLD AUTO: 294 10*3/MM3 (ref 140–450)
PMV BLD AUTO: 8.6 FL (ref 6–12)
POTASSIUM SERPL-SCNC: 4.5 MMOL/L (ref 3.5–5.2)
PROT SERPL-MCNC: 7.1 G/DL (ref 6–8.5)
RBC # BLD AUTO: 4.87 10*6/MM3 (ref 4.14–5.8)
SODIUM SERPL-SCNC: 138 MMOL/L (ref 136–145)
T4 FREE SERPL-MCNC: 1.5 NG/DL (ref 0.93–1.7)
TSH SERPL DL<=0.05 MIU/L-ACNC: 1.19 UIU/ML (ref 0.27–4.2)
WBC NRBC COR # BLD: 7.74 10*3/MM3 (ref 3.4–10.8)

## 2022-09-09 PROCEDURE — 25010000002 PEMBROLIZUMAB 100 MG/4ML SOLUTION 4 ML VIAL: Performed by: INTERNAL MEDICINE

## 2022-09-09 PROCEDURE — 96413 CHEMO IV INFUSION 1 HR: CPT

## 2022-09-09 PROCEDURE — 99214 OFFICE O/P EST MOD 30 MIN: CPT | Performed by: INTERNAL MEDICINE

## 2022-09-09 PROCEDURE — 84439 ASSAY OF FREE THYROXINE: CPT | Performed by: INTERNAL MEDICINE

## 2022-09-09 PROCEDURE — 80050 GENERAL HEALTH PANEL: CPT | Performed by: INTERNAL MEDICINE

## 2022-09-09 RX ORDER — SODIUM CHLORIDE 9 MG/ML
250 INJECTION, SOLUTION INTRAVENOUS ONCE
Status: COMPLETED | OUTPATIENT
Start: 2022-09-09 | End: 2022-09-09

## 2022-09-09 RX ADMIN — SODIUM CHLORIDE 200 MG: 9 INJECTION, SOLUTION INTRAVENOUS at 09:39

## 2022-09-09 RX ADMIN — SODIUM CHLORIDE 250 ML: 9 INJECTION, SOLUTION INTRAVENOUS at 09:37

## 2022-09-09 NOTE — PROGRESS NOTES
DATE OF VISIT: 9/9/2022    REASON FOR VISIT: Followup for left kidney cancer     PROBLEM LIST:  1. 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  Presented with hematuria and back pain  B.  CT abdomen pelvis done January 16, 2022 revealed 6 cm left kidney mass  C.  Status post radical nephrectomy done by Dr. Obrien February 16, 2022  D.  Final pathology confirmed 6 cm, grade 2 clear cell carcinoma, invades renal vein, clear surgical margins, and no lymphovascular invasion.  E. Started adjuvant immunotherapy 3/18/2022, status post 8 cycles of treatment.    2.  Hypertension  3.  Postoperative pain  4.  Right upper lobe lung nodule, 1 cm:  A.  Incidentally noted on CT chest done January 20, 2022  5.  Family history of kidney cancer  6. Immune-therapy induced pruritis  7. POT1 gene mutation    HISTORY OF PRESENT ILLNESS: The patient is a very pleasant 57 y.o. male  with past medical history significant for left kidney cancer diagnosed February 2022.  He is status post radical left nephrectomy.  He was started on adjuvant Keytruda March 18, 2022.  The patient is here today for scheduled follow-up visit with treatment cycle #9.    SUBJECTIVE: Jefry is here today by himself.  All in all he is doing fairly well.  Is trying to keep himself well-hydrated and avoid nephrotoxic agents.  He is staying active and walking every day.    Past History:  Medical History: has a past medical history of Anxiety, Cancer (HCC), Depression, Ross catheter in place (01/31/2022), Hematuria, Hypertension, PONV (postoperative nausea and vomiting), Tattoo, Tinnitus, and Wears glasses.   Surgical History: has a past surgical history that includes Vasectomy and Nephrectomy (Left, 2/16/2022).   Family History: family history includes Cancer in his brother and mother; Heart disease in his father and paternal uncle; Leukemia in his maternal aunt; Skin cancer in his maternal grandfather.   Social History: reports that he quit smoking about 6  "years ago. He has a 6.00 pack-year smoking history. His smokeless tobacco use includes chew. He reports current alcohol use. He reports that he does not use drugs.    (Not in a hospital admission)     Allergies: Patient has no known allergies.     Review of Systems   Constitutional: Positive for fatigue. Negative for appetite change, fever and unexpected weight change.   HENT: Negative for mouth sores, sore throat and trouble swallowing.    Respiratory: Negative.  Negative for cough, shortness of breath and wheezing.    Cardiovascular: Negative.  Negative for chest pain, palpitations and leg swelling.   Gastrointestinal: Negative.  Negative for abdominal distention, abdominal pain, constipation, diarrhea, nausea and vomiting.   Genitourinary: Negative for difficulty urinating, dysuria and frequency.   Musculoskeletal: Negative for arthralgias.   Skin: Negative for pallor, rash and wound.   Neurological: Negative for dizziness and weakness.   Hematological: Does not bruise/bleed easily.   Psychiatric/Behavioral: Negative for confusion and sleep disturbance. The patient is not nervous/anxious.          Current Outpatient Medications:   •  amLODIPine (NORVASC) 5 MG tablet, Take 1 tablet by mouth Daily., Disp: 90 tablet, Rfl: 3    PHYSICAL EXAMINATION:   /84   Pulse 67   Temp 97.7 °F (36.5 °C) (Temporal)   Resp 18   Ht 180.3 cm (70.98\")   Wt 94.8 kg (209 lb)   SpO2 98%   BMI 29.16 kg/m²    Pain Score    09/09/22 0815   PainSc: 0-No pain     ECOG score: 0        ECOG Performance Status: 1 - Symptomatic but completely ambulatory  General Appearance:  alert, cooperative, no apparent distress and appears stated age   Lungs:   Clear to auscultation bilaterally; respirations regular, even, and unlabored bilaterally   Heart:  Regular rate and rhythm, no murmurs appreciated   Abdomen:   Soft, non-tender, non-distended and no organomegaly     Hospital Outpatient Visit on 09/09/2022   Component Date Value Ref Range " Status   • Glucose 09/09/2022 118 (A) 65 - 99 mg/dL Final   • BUN 09/09/2022 16  6 - 20 mg/dL Final   • Creatinine 09/09/2022 1.62 (A) 0.76 - 1.27 mg/dL Final   • Sodium 09/09/2022 138  136 - 145 mmol/L Final   • Potassium 09/09/2022 4.5  3.5 - 5.2 mmol/L Final   • Chloride 09/09/2022 101  98 - 107 mmol/L Final   • CO2 09/09/2022 25.0  22.0 - 29.0 mmol/L Final   • Calcium 09/09/2022 9.7  8.6 - 10.5 mg/dL Final   • Total Protein 09/09/2022 7.1  6.0 - 8.5 g/dL Final   • Albumin 09/09/2022 4.20  3.50 - 5.20 g/dL Final   • ALT (SGPT) 09/09/2022 11  1 - 41 U/L Final   • AST (SGOT) 09/09/2022 15  1 - 40 U/L Final   • Alkaline Phosphatase 09/09/2022 83  39 - 117 U/L Final   • Total Bilirubin 09/09/2022 0.4  0.0 - 1.2 mg/dL Final   • Globulin 09/09/2022 2.9  gm/dL Final    Calculated Result   • A/G Ratio 09/09/2022 1.4  g/dL Final   • BUN/Creatinine Ratio 09/09/2022 9.9  7.0 - 25.0 Final   • Anion Gap 09/09/2022 12.0  5.0 - 15.0 mmol/L Final   • eGFR 09/09/2022 49.2 (A) >60.0 mL/min/1.73 Final    National Kidney Foundation and American Society of Nephrology (ASN) Task Force recommended calculation based on the Chronic Kidney Disease Epidemiology Collaboration (CKD-EPI) equation refit without adjustment for race.   • TSH 09/09/2022 1.190  0.270 - 4.200 uIU/mL Final   • WBC 09/09/2022 7.74  3.40 - 10.80 10*3/mm3 Final   • RBC 09/09/2022 4.87  4.14 - 5.80 10*6/mm3 Final   • Hemoglobin 09/09/2022 14.6  13.0 - 17.7 g/dL Final   • Hematocrit 09/09/2022 42.0  37.5 - 51.0 % Final   • MCV 09/09/2022 86.2  79.0 - 97.0 fL Final   • MCH 09/09/2022 30.0  26.6 - 33.0 pg Final   • MCHC 09/09/2022 34.8  31.5 - 35.7 g/dL Final   • RDW 09/09/2022 12.8  12.3 - 15.4 % Final   • RDW-SD 09/09/2022 41.0  37.0 - 54.0 fl Final   • MPV 09/09/2022 8.6  6.0 - 12.0 fL Final   • Platelets 09/09/2022 294  140 - 450 10*3/mm3 Final   • Neutrophil % 09/09/2022 74.5  42.7 - 76.0 % Final   • Lymphocyte % 09/09/2022 13.8 (A) 19.6 - 45.3 % Final   • Monocyte %  09/09/2022 9.3  5.0 - 12.0 % Final   • Eosinophil % 09/09/2022 1.9  0.3 - 6.2 % Final   • Basophil % 09/09/2022 0.4  0.0 - 1.5 % Final   • Immature Grans % 09/09/2022 0.1  0.0 - 0.5 % Final   • Neutrophils, Absolute 09/09/2022 5.76  1.70 - 7.00 10*3/mm3 Final   • Lymphocytes, Absolute 09/09/2022 1.07  0.70 - 3.10 10*3/mm3 Final   • Monocytes, Absolute 09/09/2022 0.72  0.10 - 0.90 10*3/mm3 Final   • Eosinophils, Absolute 09/09/2022 0.15  0.00 - 0.40 10*3/mm3 Final   • Basophils, Absolute 09/09/2022 0.03  0.00 - 0.20 10*3/mm3 Final   • Immature Grans, Absolute 09/09/2022 0.01  0.00 - 0.05 10*3/mm3 Final        No results found.    ASSESSMENT: The patient is a very pleasant 57 y.o. male  with left kidney cancer      PLAN:    1.  Left kidney cancer:  A.  I will proceed with adjuvant Keytruda 20 mg IV every 3 weeks cycle # 9 today.  B.  The patient will follow up with us in 3 weeks for cycle # 10.   C.  We will plan to finish 2 years of treatment if tolerated.    D.  I will continue to monitor the patient's blood work including blood counts, kidney function, liver functions, and electrolytes. We will also continue to monitor thyroid studies periodically.   E.  I did go over the blood the results with the patient from August 19, 2022 and reassured him his labs looked essentially stable.  I will repeat labs today as well as prior to each infusion.  F. We reviewed again the potential side effects of immunotherapy including but not limited to immune mediated reactions with thyroiditis, pneumonitis, hepatitis, colitis, rash, and electrolyte abnormalities, fatigue, multiorgan failure, and possibly death.  G.  We will plan to repeat his CT scans in 6 months that will be due November 2022.     2.  Right upper lobe lung nodule:  A. The right upper lobe lung nodule was stable in size at 10 mm on most recent CT done 5/13/2022.   B. He will follow up with Dr. Neff from [facility].     3.  Treatment induced nausea:  A.  He will  continue use of Zofran as needed for treatment induced nausea.    4.  Hypertension:  A.  I will continue Norvasc 5 mg daily.  B.  We will monitor the patient blood pressure while on treatment.  He would be at risk for hypotension as well as hypertension.    5. Immune-therapy induced pruritis:  A. I encouraged him to keep his skin well moisturized. He will monitor for rash or worsening symptoms.       6. POT1 Gene mutation:  A. I recommended he have annual skin exam due to higher risk for melanoma.   B. We will continue surveillance CT scans for renal cell carcinoma.   C. We will consider MRI brain in the future if he has symptoms.     7.  Elevated creatinine:  A.  I did go over the creatinine result from August 19, 2022 and reassured the patient his creatinine was stable at 1.74.  B.  The patient was advised to increase fluid intake.  C.  We will avoid nephrotoxic agents for  D.  If his creatinine continues to increase we may have to hold treatment administer steroids for possibility of autoimmune nephritis.    FOLLOW UP: 3 weeks with next cycle of treatment.    Garrett Pizano MD  9/9/2022

## 2022-09-29 DIAGNOSIS — C64.2 CARCINOMA OF LEFT KIDNEY: Primary | ICD-10-CM

## 2022-09-30 ENCOUNTER — HOSPITAL ENCOUNTER (OUTPATIENT)
Dept: ONCOLOGY | Facility: HOSPITAL | Age: 57
Discharge: HOME | End: 2022-09-30

## 2022-09-30 ENCOUNTER — OFFICE VISIT (OUTPATIENT)
Dept: ONCOLOGY | Facility: CLINIC | Age: 57
End: 2022-09-30

## 2022-09-30 ENCOUNTER — HOSPITAL ENCOUNTER (OUTPATIENT)
Dept: ONCOLOGY | Facility: HOSPITAL | Age: 57
Setting detail: INFUSION SERIES
Discharge: HOME | End: 2022-09-30

## 2022-09-30 VITALS
WEIGHT: 210 LBS | HEART RATE: 78 BPM | HEIGHT: 71 IN | SYSTOLIC BLOOD PRESSURE: 147 MMHG | BODY MASS INDEX: 29.4 KG/M2 | RESPIRATION RATE: 18 BRPM | OXYGEN SATURATION: 99 % | TEMPERATURE: 98 F | DIASTOLIC BLOOD PRESSURE: 91 MMHG

## 2022-09-30 DIAGNOSIS — C64.2 CARCINOMA OF LEFT KIDNEY: Primary | ICD-10-CM

## 2022-09-30 LAB
ALBUMIN SERPL-MCNC: 4.1 G/DL (ref 3.5–5.2)
ALBUMIN/GLOB SERPL: 1.3 G/DL
ALP SERPL-CCNC: 80 U/L (ref 39–117)
ALT SERPL W P-5'-P-CCNC: 12 U/L (ref 1–41)
ANION GAP SERPL CALCULATED.3IONS-SCNC: 10 MMOL/L (ref 5–15)
AST SERPL-CCNC: 15 U/L (ref 1–40)
BASOPHILS # BLD AUTO: 0.03 10*3/MM3 (ref 0–0.2)
BASOPHILS NFR BLD AUTO: 0.4 % (ref 0–1.5)
BILIRUB SERPL-MCNC: 0.3 MG/DL (ref 0–1.2)
BUN SERPL-MCNC: 27 MG/DL (ref 6–20)
BUN/CREAT SERPL: 16.3 (ref 7–25)
CALCIUM SPEC-SCNC: 9.5 MG/DL (ref 8.6–10.5)
CHLORIDE SERPL-SCNC: 103 MMOL/L (ref 98–107)
CO2 SERPL-SCNC: 26 MMOL/L (ref 22–29)
CREAT SERPL-MCNC: 1.66 MG/DL (ref 0.76–1.27)
DEPRECATED RDW RBC AUTO: 40.3 FL (ref 37–54)
EGFRCR SERPLBLD CKD-EPI 2021: 47.8 ML/MIN/1.73
EOSINOPHIL # BLD AUTO: 0.18 10*3/MM3 (ref 0–0.4)
EOSINOPHIL NFR BLD AUTO: 2.7 % (ref 0.3–6.2)
ERYTHROCYTE [DISTWIDTH] IN BLOOD BY AUTOMATED COUNT: 12.7 % (ref 12.3–15.4)
GLOBULIN UR ELPH-MCNC: 3.1 GM/DL
GLUCOSE SERPL-MCNC: 137 MG/DL (ref 65–99)
HCT VFR BLD AUTO: 40.4 % (ref 37.5–51)
HGB BLD-MCNC: 14 G/DL (ref 13–17.7)
IMM GRANULOCYTES # BLD AUTO: 0.01 10*3/MM3 (ref 0–0.05)
IMM GRANULOCYTES NFR BLD AUTO: 0.1 % (ref 0–0.5)
LYMPHOCYTES # BLD AUTO: 1.18 10*3/MM3 (ref 0.7–3.1)
LYMPHOCYTES NFR BLD AUTO: 17.6 % (ref 19.6–45.3)
MCH RBC QN AUTO: 29.9 PG (ref 26.6–33)
MCHC RBC AUTO-ENTMCNC: 34.7 G/DL (ref 31.5–35.7)
MCV RBC AUTO: 86.3 FL (ref 79–97)
MONOCYTES # BLD AUTO: 0.68 10*3/MM3 (ref 0.1–0.9)
MONOCYTES NFR BLD AUTO: 10.1 % (ref 5–12)
NEUTROPHILS NFR BLD AUTO: 4.63 10*3/MM3 (ref 1.7–7)
NEUTROPHILS NFR BLD AUTO: 69.1 % (ref 42.7–76)
PLATELET # BLD AUTO: 275 10*3/MM3 (ref 140–450)
PMV BLD AUTO: 8.7 FL (ref 6–12)
POTASSIUM SERPL-SCNC: 4.9 MMOL/L (ref 3.5–5.2)
PROT SERPL-MCNC: 7.2 G/DL (ref 6–8.5)
RBC # BLD AUTO: 4.68 10*6/MM3 (ref 4.14–5.8)
SODIUM SERPL-SCNC: 139 MMOL/L (ref 136–145)
WBC NRBC COR # BLD: 6.71 10*3/MM3 (ref 3.4–10.8)

## 2022-09-30 PROCEDURE — 25010000002 PEMBROLIZUMAB 100 MG/4ML SOLUTION 4 ML VIAL: Performed by: NURSE PRACTITIONER

## 2022-09-30 PROCEDURE — 96413 CHEMO IV INFUSION 1 HR: CPT

## 2022-09-30 PROCEDURE — 99214 OFFICE O/P EST MOD 30 MIN: CPT | Performed by: NURSE PRACTITIONER

## 2022-09-30 PROCEDURE — 85025 COMPLETE CBC W/AUTO DIFF WBC: CPT | Performed by: INTERNAL MEDICINE

## 2022-09-30 PROCEDURE — 80053 COMPREHEN METABOLIC PANEL: CPT | Performed by: INTERNAL MEDICINE

## 2022-09-30 RX ORDER — SODIUM CHLORIDE 9 MG/ML
250 INJECTION, SOLUTION INTRAVENOUS ONCE
Status: CANCELLED | OUTPATIENT
Start: 2022-09-30

## 2022-09-30 RX ORDER — SODIUM CHLORIDE 9 MG/ML
250 INJECTION, SOLUTION INTRAVENOUS ONCE
Status: DISCONTINUED | OUTPATIENT
Start: 2022-09-30 | End: 2022-10-01 | Stop reason: HOSPADM

## 2022-09-30 RX ADMIN — SODIUM CHLORIDE 200 MG: 9 INJECTION, SOLUTION INTRAVENOUS at 09:38

## 2022-09-30 NOTE — PROGRESS NOTES
DATE OF VISIT: 9/30/2022    REASON FOR VISIT: Followup for left kidney cancer     PROBLEM LIST:  1. 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  Presented with hematuria and back pain  B.  CT abdomen pelvis done January 16, 2022 revealed 6 cm left kidney mass  C.  Status post radical nephrectomy done by Dr. Obrien February 16, 2022  D.  Final pathology confirmed 6 cm, grade 2 clear cell carcinoma, invades renal vein, clear surgical margins, and no lymphovascular invasion.  E. Started adjuvant immunotherapy 3/18/2022, status post 8 cycles of treatment.    2.  Hypertension  3.  Postoperative pain  4.  Right upper lobe lung nodule, 1 cm:  A.  Incidentally noted on CT chest done January 20, 2022  5.  Family history of kidney cancer  6. Immune-therapy induced pruritis  7. POT1 gene mutation    HISTORY OF PRESENT ILLNESS: The patient is a very pleasant 57 y.o. male  with past medical history significant for left kidney cancer diagnosed February 2022.  He is status post radical left nephrectomy.  He was started on adjuvant Keytruda March 18, 2022.  The patient is here today for scheduled follow-up visit with treatment cycle #10.    SUBJECTIVE: Jefry is here today by himself.  He is tolerating treatment well.  He does have some fatigue but is staying active and working full time.  He is staying well-hydrated and avoiding nephrotoxic agents.  No new issues or concerns.      Past History:  Medical History: has a past medical history of Anxiety, Cancer (HCC), Depression, Ross catheter in place (01/31/2022), Hematuria, Hypertension, PONV (postoperative nausea and vomiting), Tattoo, Tinnitus, and Wears glasses.   Surgical History: has a past surgical history that includes Vasectomy and Nephrectomy (Left, 2/16/2022).   Family History: family history includes Cancer in his brother and mother; Heart disease in his father and paternal uncle; Leukemia in his maternal aunt; Skin cancer in his maternal grandfather.   Social  "History: reports that he quit smoking about 6 years ago. He has a 6.00 pack-year smoking history. His smokeless tobacco use includes chew. He reports current alcohol use. He reports that he does not use drugs.    (Not in a hospital admission)     Allergies: Patient has no known allergies.     Review of Systems   Constitutional: Positive for fatigue. Negative for appetite change, fever and unexpected weight change.   HENT: Negative for mouth sores, sore throat and trouble swallowing.    Respiratory: Negative.  Negative for cough, shortness of breath and wheezing.    Cardiovascular: Negative.  Negative for chest pain, palpitations and leg swelling.   Gastrointestinal: Negative.  Negative for abdominal distention, abdominal pain, constipation, diarrhea, nausea and vomiting.   Genitourinary: Negative for difficulty urinating, dysuria and frequency.   Musculoskeletal: Negative for arthralgias.   Skin: Negative for pallor, rash and wound.   Neurological: Negative for dizziness and weakness.   Hematological: Does not bruise/bleed easily.   Psychiatric/Behavioral: Negative for confusion and sleep disturbance. The patient is not nervous/anxious.          Current Outpatient Medications:   •  amLODIPine (NORVASC) 5 MG tablet, Take 1 tablet by mouth Daily., Disp: 90 tablet, Rfl: 3    PHYSICAL EXAMINATION:   /91 Comment: LUE  Pulse 78   Temp 98 °F (36.7 °C) (Infrared)   Resp 18   Ht 180.3 cm (71\")   Wt 95.3 kg (210 lb)   SpO2 99% Comment: RA  BMI 29.29 kg/m²    Pain Score    09/30/22 0810   PainSc: 0-No pain     ECOG score: 0        ECOG Performance Status: 1 - Symptomatic but completely ambulatory  General Appearance:  alert, cooperative, no apparent distress and appears stated age   Lungs:   Clear to auscultation bilaterally; respirations regular, even, and unlabored bilaterally   Heart:  Regular rate and rhythm, no murmurs appreciated   Abdomen:   Soft, non-tender, non-distended and no organomegaly     Hospital " Outpatient Visit on 09/30/2022   Component Date Value Ref Range Status   • WBC 09/30/2022 6.71  3.40 - 10.80 10*3/mm3 Final   • RBC 09/30/2022 4.68  4.14 - 5.80 10*6/mm3 Final   • Hemoglobin 09/30/2022 14.0  13.0 - 17.7 g/dL Final   • Hematocrit 09/30/2022 40.4  37.5 - 51.0 % Final   • MCV 09/30/2022 86.3  79.0 - 97.0 fL Final   • MCH 09/30/2022 29.9  26.6 - 33.0 pg Final   • MCHC 09/30/2022 34.7  31.5 - 35.7 g/dL Final   • RDW 09/30/2022 12.7  12.3 - 15.4 % Final   • RDW-SD 09/30/2022 40.3  37.0 - 54.0 fl Final   • MPV 09/30/2022 8.7  6.0 - 12.0 fL Final   • Platelets 09/30/2022 275  140 - 450 10*3/mm3 Final   • Neutrophil % 09/30/2022 69.1  42.7 - 76.0 % Final   • Lymphocyte % 09/30/2022 17.6 (A) 19.6 - 45.3 % Final   • Monocyte % 09/30/2022 10.1  5.0 - 12.0 % Final   • Eosinophil % 09/30/2022 2.7  0.3 - 6.2 % Final   • Basophil % 09/30/2022 0.4  0.0 - 1.5 % Final   • Immature Grans % 09/30/2022 0.1  0.0 - 0.5 % Final   • Neutrophils, Absolute 09/30/2022 4.63  1.70 - 7.00 10*3/mm3 Final   • Lymphocytes, Absolute 09/30/2022 1.18  0.70 - 3.10 10*3/mm3 Final   • Monocytes, Absolute 09/30/2022 0.68  0.10 - 0.90 10*3/mm3 Final   • Eosinophils, Absolute 09/30/2022 0.18  0.00 - 0.40 10*3/mm3 Final   • Basophils, Absolute 09/30/2022 0.03  0.00 - 0.20 10*3/mm3 Final   • Immature Grans, Absolute 09/30/2022 0.01  0.00 - 0.05 10*3/mm3 Final        No results found.    ASSESSMENT: The patient is a very pleasant 57 y.o. male  with left kidney cancer      PLAN:    1.  Left kidney cancer:  A.  I will proceed with adjuvant Keytruda 20 mg IV every 3 weeks cycle # 10 today.  B.  The patient will follow up with us in 3 weeks for cycle # 11.   C.  We will plan to finish 2 years of treatment if tolerated.    OPHELIA.  I will continue to monitor the patient's blood work including blood counts, kidney function, liver functions, and electrolytes. We will also continue to monitor thyroid studies periodically.   BOGDAN  I did go over his CBC results  with the patient from today that is normal.  CMP pending.  Will follow up with results and notify patient of any significant findings.  I will repeat labs prior to each infusion.  F. We reviewed again the potential side effects of immunotherapy including but not limited to immune mediated reactions with thyroiditis, pneumonitis, hepatitis, colitis, rash, and electrolyte abnormalities, fatigue, multiorgan failure, and possibly death.  G.  We will plan to repeat his CT scans in 6 months that will be due November 2022.     2.  Right upper lobe lung nodule:  A. The right upper lobe lung nodule was stable in size at 10 mm on most recent CT done 5/13/2022.   B. He will follow up with Dr. Neff from pulmonary.     3.  Treatment induced nausea:  A.  He will continue use of Zofran as needed for treatment induced nausea.    4.  Hypertension:  A.  I will continue Norvasc 5 mg daily.  B.  We will monitor the patient blood pressure while on treatment.  He would be at risk for hypotension as well as hypertension.    5. Immune-therapy induced pruritis:  A. I encouraged him to keep his skin well moisturized. He will monitor for rash or worsening symptoms.       6. POT1 Gene mutation:  A. I recommended he have annual skin exam due to higher risk for melanoma.   B. We will continue surveillance CT scans for renal cell carcinoma.   C. We will consider MRI brain in the future if he has symptoms.     7.  Elevated creatinine:  A.  I did go over the creatinine result from September 9, 2022 and reassured the patient his creatinine was stable at 1.62.  B.  The patient was advised to continue to increase fluid intake.  C.  He will continue to avoid nephrotoxic agents.  D.  If his creatinine increases we may have to hold treatment and administer steroids for possibility of autoimmune nephritis.    FOLLOW UP: 3 weeks with next cycle of treatment.    Sherita Restrepo, APRN  9/30/2022

## 2022-10-21 ENCOUNTER — HOSPITAL ENCOUNTER (OUTPATIENT)
Dept: ONCOLOGY | Facility: HOSPITAL | Age: 57
Setting detail: INFUSION SERIES
Discharge: HOME | End: 2022-10-21

## 2022-10-21 ENCOUNTER — OFFICE VISIT (OUTPATIENT)
Dept: ONCOLOGY | Facility: CLINIC | Age: 57
End: 2022-10-21

## 2022-10-21 ENCOUNTER — APPOINTMENT (OUTPATIENT)
Dept: ONCOLOGY | Facility: HOSPITAL | Age: 57
End: 2022-10-21

## 2022-10-21 VITALS
TEMPERATURE: 98 F | HEIGHT: 71 IN | OXYGEN SATURATION: 97 % | WEIGHT: 214 LBS | SYSTOLIC BLOOD PRESSURE: 154 MMHG | BODY MASS INDEX: 29.96 KG/M2 | RESPIRATION RATE: 18 BRPM | DIASTOLIC BLOOD PRESSURE: 82 MMHG | HEART RATE: 69 BPM

## 2022-10-21 DIAGNOSIS — C64.2 CARCINOMA OF LEFT KIDNEY: Primary | ICD-10-CM

## 2022-10-21 PROBLEM — Z12.11 ENCOUNTER FOR SCREENING FOR MALIGNANT NEOPLASM OF COLON: Status: ACTIVE | Noted: 2022-10-21

## 2022-10-21 LAB
ALBUMIN SERPL-MCNC: 4.2 G/DL (ref 3.5–5.2)
ALBUMIN/GLOB SERPL: 1.4 G/DL
ALP SERPL-CCNC: 93 U/L (ref 39–117)
ALT SERPL W P-5'-P-CCNC: 12 U/L (ref 1–41)
ANION GAP SERPL CALCULATED.3IONS-SCNC: 12 MMOL/L (ref 5–15)
AST SERPL-CCNC: 13 U/L (ref 1–40)
BASOPHILS # BLD AUTO: 0.01 10*3/MM3 (ref 0–0.2)
BASOPHILS NFR BLD AUTO: 0.1 % (ref 0–1.5)
BILIRUB SERPL-MCNC: 0.3 MG/DL (ref 0–1.2)
BUN SERPL-MCNC: 22 MG/DL (ref 6–20)
BUN/CREAT SERPL: 12.4 (ref 7–25)
CALCIUM SPEC-SCNC: 9.4 MG/DL (ref 8.6–10.5)
CHLORIDE SERPL-SCNC: 102 MMOL/L (ref 98–107)
CO2 SERPL-SCNC: 25 MMOL/L (ref 22–29)
CREAT SERPL-MCNC: 1.78 MG/DL (ref 0.76–1.27)
DEPRECATED RDW RBC AUTO: 42.8 FL (ref 37–54)
EGFRCR SERPLBLD CKD-EPI 2021: 43.9 ML/MIN/1.73
EOSINOPHIL # BLD AUTO: 0.15 10*3/MM3 (ref 0–0.4)
EOSINOPHIL NFR BLD AUTO: 2 % (ref 0.3–6.2)
ERYTHROCYTE [DISTWIDTH] IN BLOOD BY AUTOMATED COUNT: 13.2 % (ref 12.3–15.4)
GLOBULIN UR ELPH-MCNC: 2.9 GM/DL
GLUCOSE SERPL-MCNC: 112 MG/DL (ref 65–99)
HCT VFR BLD AUTO: 40.5 % (ref 37.5–51)
HGB BLD-MCNC: 13.6 G/DL (ref 13–17.7)
IMM GRANULOCYTES # BLD AUTO: 0.01 10*3/MM3 (ref 0–0.05)
IMM GRANULOCYTES NFR BLD AUTO: 0.1 % (ref 0–0.5)
LYMPHOCYTES # BLD AUTO: 1.15 10*3/MM3 (ref 0.7–3.1)
LYMPHOCYTES NFR BLD AUTO: 15.3 % (ref 19.6–45.3)
MCH RBC QN AUTO: 29.4 PG (ref 26.6–33)
MCHC RBC AUTO-ENTMCNC: 33.6 G/DL (ref 31.5–35.7)
MCV RBC AUTO: 87.7 FL (ref 79–97)
MONOCYTES # BLD AUTO: 0.71 10*3/MM3 (ref 0.1–0.9)
MONOCYTES NFR BLD AUTO: 9.4 % (ref 5–12)
NEUTROPHILS NFR BLD AUTO: 5.49 10*3/MM3 (ref 1.7–7)
NEUTROPHILS NFR BLD AUTO: 73.1 % (ref 42.7–76)
PLATELET # BLD AUTO: 277 10*3/MM3 (ref 140–450)
PMV BLD AUTO: 8.6 FL (ref 6–12)
POTASSIUM SERPL-SCNC: 4.6 MMOL/L (ref 3.5–5.2)
PROT SERPL-MCNC: 7.1 G/DL (ref 6–8.5)
RBC # BLD AUTO: 4.62 10*6/MM3 (ref 4.14–5.8)
SODIUM SERPL-SCNC: 139 MMOL/L (ref 136–145)
T4 FREE SERPL-MCNC: 1.32 NG/DL (ref 0.93–1.7)
TSH SERPL DL<=0.05 MIU/L-ACNC: 1.19 UIU/ML (ref 0.27–4.2)
WBC NRBC COR # BLD: 7.52 10*3/MM3 (ref 3.4–10.8)

## 2022-10-21 PROCEDURE — 84439 ASSAY OF FREE THYROXINE: CPT | Performed by: NURSE PRACTITIONER

## 2022-10-21 PROCEDURE — 99214 OFFICE O/P EST MOD 30 MIN: CPT | Performed by: NURSE PRACTITIONER

## 2022-10-21 PROCEDURE — 96413 CHEMO IV INFUSION 1 HR: CPT

## 2022-10-21 PROCEDURE — 25010000002 PEMBROLIZUMAB 100 MG/4ML SOLUTION 4 ML VIAL: Performed by: NURSE PRACTITIONER

## 2022-10-21 PROCEDURE — 80050 GENERAL HEALTH PANEL: CPT | Performed by: NURSE PRACTITIONER

## 2022-10-21 RX ORDER — SODIUM CHLORIDE 9 MG/ML
250 INJECTION, SOLUTION INTRAVENOUS ONCE
Status: CANCELLED | OUTPATIENT
Start: 2022-10-21

## 2022-10-21 RX ORDER — SODIUM CHLORIDE 9 MG/ML
250 INJECTION, SOLUTION INTRAVENOUS ONCE
Status: CANCELLED | OUTPATIENT
Start: 2022-11-11

## 2022-10-21 RX ADMIN — SODIUM CHLORIDE 200 MG: 9 INJECTION, SOLUTION INTRAVENOUS at 11:07

## 2022-10-21 NOTE — PROGRESS NOTES
DATE OF VISIT: 10/21/2022    REASON FOR VISIT: Followup for left kidney cancer     PROBLEM LIST:  1. 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  Presented with hematuria and back pain  B.  CT abdomen pelvis done January 16, 2022 revealed 6 cm left kidney mass  C.  Status post radical nephrectomy done by Dr. Obrien February 16, 2022  D.  Final pathology confirmed 6 cm, grade 2 clear cell carcinoma, invades renal vein, clear surgical margins, and no lymphovascular invasion.  E. Started adjuvant immunotherapy 3/18/2022, status post 10 cycles of treatment.    2.  Hypertension  3.  Postoperative pain  4.  Right upper lobe lung nodule, 1 cm:  A.  Incidentally noted on CT chest done January 20, 2022  5.  Family history of kidney cancer  6. Immune-therapy induced pruritis  7. POT1 gene mutation    HISTORY OF PRESENT ILLNESS: The patient is a very pleasant 57 y.o. male  with past medical history significant for left kidney cancer diagnosed February 2022.  He is status post radical left nephrectomy.  He was started on adjuvant Keytruda March 18, 2022.  The patient is here today for scheduled follow-up visit with treatment cycle #11.    SUBJECTIVE: The patient is here today by himself.  He has been doing well since his last visit.  He continues to have some mild arthralgias, however he has not required any treatment for his symptoms and continues to be active.  He denies diarrhea, nausea, or vomiting.  He complains of dry skin but no significant rash.    Past History:  Medical History: has a past medical history of Anxiety, Cancer (HCC), Depression, Ross catheter in place (01/31/2022), Hematuria, Hypertension, PONV (postoperative nausea and vomiting), Tattoo, Tinnitus, and Wears glasses.   Surgical History: has a past surgical history that includes Vasectomy and Nephrectomy (Left, 2/16/2022).   Family History: family history includes Cancer in his brother and mother; Heart disease in his father and paternal uncle;  "Leukemia in his maternal aunt; Skin cancer in his maternal grandfather.   Social History: reports that he quit smoking about 6 years ago. His smoking use included cigarettes. He has a 6.00 pack-year smoking history. His smokeless tobacco use includes chew. He reports current alcohol use. He reports that he does not use drugs.    (Not in a hospital admission)     Allergies: Patient has no known allergies.     Review of Systems   Constitutional: Positive for fatigue.   Musculoskeletal: Positive for arthralgias.   Skin:        Dry skin   All other systems reviewed and are negative.      PHYSICAL EXAMINATION:   /82 Comment: RUE  Pulse 69   Temp 98 °F (36.7 °C) (Temporal)   Resp 18   Ht 180.3 cm (70.98\")   Wt 97.1 kg (214 lb)   SpO2 97%   BMI 29.86 kg/m²    Pain Score    10/21/22 0945   PainSc: 5  Comment: joint pain     ECOG score: 0        ECOG Performance Status: 0 - Asymptomatic  General Appearance:  alert, cooperative, no apparent distress and appears stated age   Lungs:   Clear to auscultation bilaterally; respirations regular, even, and unlabored bilaterally   Heart:  Regular rate and rhythm, no murmurs appreciated   Abdomen:   Soft, non-tender, non-distended and no organomegaly     Hospital Outpatient Visit on 10/21/2022   Component Date Value Ref Range Status   • WBC 10/21/2022 7.52  3.40 - 10.80 10*3/mm3 Final   • RBC 10/21/2022 4.62  4.14 - 5.80 10*6/mm3 Final   • Hemoglobin 10/21/2022 13.6  13.0 - 17.7 g/dL Final   • Hematocrit 10/21/2022 40.5  37.5 - 51.0 % Final   • MCV 10/21/2022 87.7  79.0 - 97.0 fL Final   • MCH 10/21/2022 29.4  26.6 - 33.0 pg Final   • MCHC 10/21/2022 33.6  31.5 - 35.7 g/dL Final   • RDW 10/21/2022 13.2  12.3 - 15.4 % Final   • RDW-SD 10/21/2022 42.8  37.0 - 54.0 fl Final   • MPV 10/21/2022 8.6  6.0 - 12.0 fL Final   • Platelets 10/21/2022 277  140 - 450 10*3/mm3 Final   • Neutrophil % 10/21/2022 73.1  42.7 - 76.0 % Final   • Lymphocyte % 10/21/2022 15.3 (L)  19.6 - 45.3 % " Final   • Monocyte % 10/21/2022 9.4  5.0 - 12.0 % Final   • Eosinophil % 10/21/2022 2.0  0.3 - 6.2 % Final   • Basophil % 10/21/2022 0.1  0.0 - 1.5 % Final   • Immature Grans % 10/21/2022 0.1  0.0 - 0.5 % Final   • Neutrophils, Absolute 10/21/2022 5.49  1.70 - 7.00 10*3/mm3 Final   • Lymphocytes, Absolute 10/21/2022 1.15  0.70 - 3.10 10*3/mm3 Final   • Monocytes, Absolute 10/21/2022 0.71  0.10 - 0.90 10*3/mm3 Final   • Eosinophils, Absolute 10/21/2022 0.15  0.00 - 0.40 10*3/mm3 Final   • Basophils, Absolute 10/21/2022 0.01  0.00 - 0.20 10*3/mm3 Final   • Immature Grans, Absolute 10/21/2022 0.01  0.00 - 0.05 10*3/mm3 Final        No results found.    ASSESSMENT: The patient is a very pleasant 57 y.o. male  with left kidney cancer      PLAN:    1.  Left kidney cancer:  A.  I will proceed with adjuvant Keytruda 20 mg IV every 3 weeks cycle # 11 today.  B.  The patient will follow up with us in 3 weeks for cycle # 12.   C.  We will plan to finish 2 years of treatment if tolerated.    D.  I will continue to monitor the patient's blood work including blood counts, kidney function, liver functions, and electrolytes. We will also continue to monitor thyroid studies periodically.   E.  I reviewed the lab results from today with the patient.  I reassured him that his blood counts including white blood cell count, hemoglobin, and platelet count are all within normal limits.  We will follow-up on his CMP and thyroid studies from today.  I did review with him that his creatinine from 9/30/2022 was improved to 1.66.  F. We reviewed again the potential side effects of immunotherapy including but not limited to immune mediated reactions with thyroiditis, pneumonitis, hepatitis, colitis, rash, and electrolyte abnormalities, fatigue, multiorgan failure, and possibly death.  G.  We will plan to repeat his CT scans in 6 months that will be due November 2022, and ordered for prior to return.     2.  Right upper lobe lung nodule:  A. The  right upper lobe lung nodule was stable in size at 10 mm on most recent CT done 5/13/2022.  We will see what this looks like on next set of scans ordered for prior to return.  B. He will follow up with Dr. Neff from pulmonary.     3.  Treatment induced nausea:  A.  He will continue use of Zofran as needed for treatment induced nausea.    4.  Hypertension:  A.  I will continue Norvasc 5 mg daily.  B.  We will monitor the patient blood pressure while on treatment.  He would be at risk for hypotension as well as hypertension.    5. Immune-therapy induced pruritis:  A. I encouraged him to keep his skin well moisturized. He will monitor for rash or worsening symptoms.       6. POT1 Gene mutation:  A. I recommended he have annual skin exam due to higher risk for melanoma.   B. We will continue surveillance CT scans for renal cell carcinoma.   C. We will consider MRI brain in the future if he has symptoms.     7.  Elevated creatinine:  A.  I did go over the creatinine result from September 30, 2022 and reassured the patient his creatinine was stable at 1.66.  B.  The patient was advised to continue to increase fluid intake.  C.  He will continue to avoid nephrotoxic agents.  D.  If his creatinine increases we may have to hold treatment and administer steroids for possibility of autoimmune nephritis.  E.  He has follow-up scheduled with Dr. Obrien with urology next month for ongoing evaluation.    FOLLOW UP: 3 weeks with next cycle of treatment.    Yolanda Shaffer, APRN  10/21/2022

## 2022-10-28 ENCOUNTER — TELEPHONE (OUTPATIENT)
Dept: GASTROENTEROLOGY | Facility: CLINIC | Age: 57
End: 2022-10-28

## 2022-10-28 NOTE — TELEPHONE ENCOUNTER
Called patient to confirm colonoscopy for 11/04/22. Reached voicemail. Left detailed message for return call

## 2022-11-04 ENCOUNTER — HOSPITAL ENCOUNTER (OUTPATIENT)
Facility: HOSPITAL | Age: 57
Setting detail: HOSPITAL OUTPATIENT SURGERY
Discharge: HOME | End: 2022-11-04
Attending: INTERNAL MEDICINE | Admitting: INTERNAL MEDICINE

## 2022-11-04 ENCOUNTER — ANESTHESIA (OUTPATIENT)
Dept: GASTROENTEROLOGY | Facility: HOSPITAL | Age: 57
End: 2022-11-04

## 2022-11-04 ENCOUNTER — ANESTHESIA EVENT (OUTPATIENT)
Dept: GASTROENTEROLOGY | Facility: HOSPITAL | Age: 57
End: 2022-11-04

## 2022-11-04 VITALS
HEART RATE: 67 BPM | WEIGHT: 208 LBS | BODY MASS INDEX: 29.12 KG/M2 | DIASTOLIC BLOOD PRESSURE: 90 MMHG | TEMPERATURE: 97.4 F | SYSTOLIC BLOOD PRESSURE: 138 MMHG | RESPIRATION RATE: 18 BRPM | HEIGHT: 71 IN | OXYGEN SATURATION: 96 %

## 2022-11-04 DIAGNOSIS — Z12.11 ENCOUNTER FOR SCREENING FOR MALIGNANT NEOPLASM OF COLON: ICD-10-CM

## 2022-11-04 PROCEDURE — 25010000002 PROPOFOL 10 MG/ML EMULSION: Performed by: NURSE ANESTHETIST, CERTIFIED REGISTERED

## 2022-11-04 PROCEDURE — 45380 COLONOSCOPY AND BIOPSY: CPT | Performed by: INTERNAL MEDICINE

## 2022-11-04 PROCEDURE — 45385 COLONOSCOPY W/LESION REMOVAL: CPT | Performed by: INTERNAL MEDICINE

## 2022-11-04 PROCEDURE — 88305 TISSUE EXAM BY PATHOLOGIST: CPT

## 2022-11-04 RX ORDER — SODIUM CHLORIDE 9 MG/ML
INJECTION, SOLUTION INTRAVENOUS CONTINUOUS PRN
Status: DISCONTINUED | OUTPATIENT
Start: 2022-11-04 | End: 2022-11-04 | Stop reason: SURG

## 2022-11-04 RX ORDER — LIDOCAINE HYDROCHLORIDE 20 MG/ML
INJECTION, SOLUTION INTRAVENOUS AS NEEDED
Status: DISCONTINUED | OUTPATIENT
Start: 2022-11-04 | End: 2022-11-04 | Stop reason: SURG

## 2022-11-04 RX ORDER — SIMETHICONE 20 MG/.3ML
EMULSION ORAL AS NEEDED
Status: DISCONTINUED | OUTPATIENT
Start: 2022-11-04 | End: 2022-11-04 | Stop reason: HOSPADM

## 2022-11-04 RX ORDER — SODIUM CHLORIDE 9 MG/ML
70 INJECTION, SOLUTION INTRAVENOUS CONTINUOUS PRN
Status: DISCONTINUED | OUTPATIENT
Start: 2022-11-04 | End: 2022-11-04 | Stop reason: HOSPADM

## 2022-11-04 RX ADMIN — PROPOFOL 200 MCG/KG/MIN: 10 INJECTION, EMULSION INTRAVENOUS at 11:55

## 2022-11-04 RX ADMIN — LIDOCAINE HYDROCHLORIDE 60 MG: 20 INJECTION, SOLUTION INTRAVENOUS at 11:55

## 2022-11-04 RX ADMIN — SODIUM CHLORIDE: 9 INJECTION, SOLUTION INTRAVENOUS at 11:49

## 2022-11-04 RX ADMIN — SODIUM CHLORIDE 70 ML/HR: 9 INJECTION, SOLUTION INTRAVENOUS at 10:39

## 2022-11-04 NOTE — ANESTHESIA POSTPROCEDURE EVALUATION
Patient: Tobias Newberry    Procedure Summary     Date: 11/04/22 Room / Location: [facility] / [facility]    Anesthesia Start: 1149 Anesthesia Stop: 1226    Procedure: COLONOSCOPY, BIOPSIES, POLYPECTOMY (Anus) Diagnosis:       Encounter for screening for malignant neoplasm of colon      (Encounter for screening for malignant neoplasm of colon [Z12.11])    Surgeons: Rico Pizarro MD Provider: Irvin Tony CRNA    Anesthesia Type: MAC ASA Status: 2          Anesthesia Type: MAC    Vitals  No vitals data found for the desired time range.          Post Anesthesia Care and Evaluation    Patient location during evaluation: PHASE II  Patient participation: complete - patient participated  Level of consciousness: awake and alert  Pain score: 0  Pain management: satisfactory to patient    Airway patency: patent  Anesthetic complications: No anesthetic complications  PONV Status: none  Cardiovascular status: acceptable and stable  Respiratory status: acceptable, nonlabored ventilation, spontaneous ventilation and unassisted  Hydration status: acceptable    Comments: Vitals signs as noted in nursing documentation as per protocol.

## 2022-11-04 NOTE — H&P
[facility]  HISTORY AND PHYSICAL    Patient Name: Tobias Newberry  : 1965  MRN: [id number]    Chief Complaint:   For screening colonoscopy    History Of Presenting Illness:    Average risk screening  H/o RCC    Past Medical History:   Diagnosis Date   • Cancer (HCC)     renal cell carcinoma:  on immunotherapy   • Hematuria    • History of transfusion 2022    pt states he had transfusion during surgery to remove kidney   • Hypertension    • Tattoo    • Tinnitus    • Wears glasses        Past Surgical History:   Procedure Laterality Date   • NEPHRECTOMY Left 2022    Procedure: OPEN LEFT RADICAL NEPHRECTOMY WITH LEFT ADRENALECTOMY;  Surgeon: Manjinder Obrien MD;  Location: [facility];  Service: Urology;  Laterality: Left;   • VASECTOMY         Social History     Socioeconomic History   • Marital status:    Tobacco Use   • Smoking status: Former     Packs/day: 1.50     Years: 4.00     Pack years: 6.00     Types: Cigarettes     Quit date: 1/10/2016     Years since quittin.8   • Smokeless tobacco: Former     Types: Chew     Quit date: 3/8/2022   Vaping Use   • Vaping Use: Some days   • Substances: Nicotine   • Devices: RefFlightfoxble tank   Substance and Sexual Activity   • Alcohol use: Yes     Comment: SOCIAL    • Drug use: No   • Sexual activity: Defer     Partners: Female       Family History   Problem Relation Age of Onset   • Cancer Mother    • Heart disease Father    • Cancer Brother    • Leukemia Maternal Aunt    • Heart disease Paternal Uncle    • Skin cancer Maternal Grandfather        Prior to Admission Medications:  Medications Prior to Admission   Medication Sig Dispense Refill Last Dose   • amLODIPine (NORVASC) 5 MG tablet Take 1 tablet by mouth Daily. 90 tablet 3 11/3/2022 at 0700       Allergies:  No Known Allergies     Vitals: Temp:  [98.2 °F (36.8 °C)] 98.2 °F (36.8 °C)  Heart Rate:  [67] 67  Resp:  [17] 17  BP: (141)/(100) 141/100    Review Of  Systems:  Constitutional:  Negative for chills, fever, and unexpected weight change.  Respiratory:  Negative for cough, chest tightness, shortness of breath, and wheezing.  Cardiovascular:  Negative for chest pain, palpitations, and leg swelling.  Gastrointestinal:  Negative for abdominal distention, abdominal pain, Nausea, vomiting.  Neurological:  Negative for Weakness, numbness, and headaches.     Physical Exam:    General Appearance:  Alert, cooperative, in no acute distress.   Lungs:   Clear to auscultation, respirations regular, even and                 unlabored.   Heart:  Regular rhythm and normal rate.   Abdomen:   Normal bowel sounds, no masses, no organomegaly. Soft, non-tender, non-distended   Neurologic: Alert and oriented x 3. Moves all four limbs equally       Plan: COLONOSCOPY (N/A)     Rico Pizarro MD  11/4/2022

## 2022-11-04 NOTE — DISCHARGE INSTRUCTIONS
- Discharge patient to home.   - High fiber diet.   - Continue present medications.   - Await pathology results.   - Avoid NSAIDS  - Repeat colonoscopy in 3 years for surveillance and due to suboptimal bowel prep.   - Return to GI office in 4 weeks.     To assist you in voiding:  Drink plenty of fluids  Listen to running water while attempting to void.    If you are unable to urinate and you have an uncomfortable urge to void or it has been   6 hours since you were discharged, return to the Emergency Room     No pushing, pulling, tugging,  heavy lifting, or strenuous activity.  No major decision making, driving, or drinking alcoholic beverages for 24 hours. ( due to the medications you have  received)  Always use good hand hygiene/washing techniques.  NO driving while taking pain medications.

## 2022-11-04 NOTE — ANESTHESIA PREPROCEDURE EVALUATION
Anesthesia Evaluation     Patient summary reviewed and Nursing notes reviewed   history of anesthetic complications: PONV  NPO Solid Status: > 8 hours  NPO Liquid Status: > 8 hours           Airway   Mallampati: II  TM distance: >3 FB  Neck ROM: full  Possible difficult intubation  Dental - normal exam     Pulmonary - normal exam   (+) a smoker Former Abstained day of surgery,   Cardiovascular - normal exam    (+) hypertension well controlled less than 2 medications, hyperlipidemia,       Neuro/Psych  (+) psychiatric history Anxiety and Depression,    GI/Hepatic/Renal/Endo    (+)  GERD well controlled,  renal disease,     Musculoskeletal     Abdominal  - normal exam   Substance History   (+) alcohol use,      OB/GYN          Other   arthritis,    history of cancer (s/p left nephrectomy) remission                      Anesthesia Plan    ASA 2     MAC     intravenous induction     Anesthetic plan, risks, benefits, and alternatives have been provided, discussed and informed consent has been obtained with: patient.  Pre-procedure education provided  Plan discussed with CRNA.        CODE STATUS:

## 2022-11-07 LAB — REF LAB TEST METHOD: NORMAL

## 2022-11-10 ENCOUNTER — APPOINTMENT (OUTPATIENT)
Dept: CT IMAGING | Facility: HOSPITAL | Age: 57
End: 2022-11-10

## 2022-11-11 ENCOUNTER — HOSPITAL ENCOUNTER (OUTPATIENT)
Dept: ONCOLOGY | Facility: HOSPITAL | Age: 57
Setting detail: INFUSION SERIES
Discharge: HOME | End: 2022-11-11

## 2022-11-11 ENCOUNTER — OFFICE VISIT (OUTPATIENT)
Dept: ONCOLOGY | Facility: CLINIC | Age: 57
End: 2022-11-11

## 2022-11-11 ENCOUNTER — HOSPITAL ENCOUNTER (OUTPATIENT)
Dept: ONCOLOGY | Facility: HOSPITAL | Age: 57
Setting detail: INFUSION SERIES
End: 2022-11-11

## 2022-11-11 VITALS
DIASTOLIC BLOOD PRESSURE: 83 MMHG | TEMPERATURE: 97.3 F | OXYGEN SATURATION: 97 % | HEIGHT: 71 IN | RESPIRATION RATE: 18 BRPM | HEART RATE: 76 BPM | SYSTOLIC BLOOD PRESSURE: 155 MMHG | WEIGHT: 211 LBS | BODY MASS INDEX: 29.54 KG/M2

## 2022-11-11 DIAGNOSIS — C64.2 CARCINOMA OF LEFT KIDNEY: Primary | ICD-10-CM

## 2022-11-11 LAB
ALBUMIN SERPL-MCNC: 4.2 G/DL (ref 3.5–5.2)
ALBUMIN/GLOB SERPL: 1.3 G/DL
ALP SERPL-CCNC: 87 U/L (ref 39–117)
ALT SERPL W P-5'-P-CCNC: 12 U/L (ref 1–41)
ANION GAP SERPL CALCULATED.3IONS-SCNC: 12 MMOL/L (ref 5–15)
AST SERPL-CCNC: 15 U/L (ref 1–40)
BASOPHILS # BLD AUTO: 0.02 10*3/MM3 (ref 0–0.2)
BASOPHILS NFR BLD AUTO: 0.3 % (ref 0–1.5)
BILIRUB SERPL-MCNC: 0.3 MG/DL (ref 0–1.2)
BUN SERPL-MCNC: 20 MG/DL (ref 6–20)
BUN/CREAT SERPL: 11.4 (ref 7–25)
CALCIUM SPEC-SCNC: 10 MG/DL (ref 8.6–10.5)
CHLORIDE SERPL-SCNC: 101 MMOL/L (ref 98–107)
CO2 SERPL-SCNC: 25 MMOL/L (ref 22–29)
CREAT SERPL-MCNC: 1.75 MG/DL (ref 0.76–1.27)
DEPRECATED RDW RBC AUTO: 41 FL (ref 37–54)
EGFRCR SERPLBLD CKD-EPI 2021: 44.9 ML/MIN/1.73
EOSINOPHIL # BLD AUTO: 0.16 10*3/MM3 (ref 0–0.4)
EOSINOPHIL NFR BLD AUTO: 2.5 % (ref 0.3–6.2)
ERYTHROCYTE [DISTWIDTH] IN BLOOD BY AUTOMATED COUNT: 13.2 % (ref 12.3–15.4)
GLOBULIN UR ELPH-MCNC: 3.2 GM/DL
GLUCOSE SERPL-MCNC: 114 MG/DL (ref 65–99)
HCT VFR BLD AUTO: 40 % (ref 37.5–51)
HGB BLD-MCNC: 13.9 G/DL (ref 13–17.7)
IMM GRANULOCYTES # BLD AUTO: 0.02 10*3/MM3 (ref 0–0.05)
IMM GRANULOCYTES NFR BLD AUTO: 0.3 % (ref 0–0.5)
LYMPHOCYTES # BLD AUTO: 1.48 10*3/MM3 (ref 0.7–3.1)
LYMPHOCYTES NFR BLD AUTO: 23.3 % (ref 19.6–45.3)
MCH RBC QN AUTO: 29.7 PG (ref 26.6–33)
MCHC RBC AUTO-ENTMCNC: 34.8 G/DL (ref 31.5–35.7)
MCV RBC AUTO: 85.5 FL (ref 79–97)
MONOCYTES # BLD AUTO: 0.66 10*3/MM3 (ref 0.1–0.9)
MONOCYTES NFR BLD AUTO: 10.4 % (ref 5–12)
NEUTROPHILS NFR BLD AUTO: 4.01 10*3/MM3 (ref 1.7–7)
NEUTROPHILS NFR BLD AUTO: 63.2 % (ref 42.7–76)
PLATELET # BLD AUTO: 294 10*3/MM3 (ref 140–450)
PMV BLD AUTO: 8.8 FL (ref 6–12)
POTASSIUM SERPL-SCNC: 4.8 MMOL/L (ref 3.5–5.2)
PROT SERPL-MCNC: 7.4 G/DL (ref 6–8.5)
RBC # BLD AUTO: 4.68 10*6/MM3 (ref 4.14–5.8)
SODIUM SERPL-SCNC: 138 MMOL/L (ref 136–145)
WBC NRBC COR # BLD: 6.35 10*3/MM3 (ref 3.4–10.8)

## 2022-11-11 PROCEDURE — 25010000002 PEMBROLIZUMAB 100 MG/4ML SOLUTION 4 ML VIAL: Performed by: NURSE PRACTITIONER

## 2022-11-11 PROCEDURE — 85025 COMPLETE CBC W/AUTO DIFF WBC: CPT | Performed by: NURSE PRACTITIONER

## 2022-11-11 PROCEDURE — 99214 OFFICE O/P EST MOD 30 MIN: CPT | Performed by: INTERNAL MEDICINE

## 2022-11-11 PROCEDURE — 80053 COMPREHEN METABOLIC PANEL: CPT | Performed by: NURSE PRACTITIONER

## 2022-11-11 PROCEDURE — 96413 CHEMO IV INFUSION 1 HR: CPT

## 2022-11-11 RX ORDER — SODIUM CHLORIDE 9 MG/ML
250 INJECTION, SOLUTION INTRAVENOUS ONCE
Status: DISCONTINUED | OUTPATIENT
Start: 2022-11-11 | End: 2022-11-12 | Stop reason: HOSPADM

## 2022-11-11 RX ADMIN — SODIUM CHLORIDE 200 MG: 9 INJECTION, SOLUTION INTRAVENOUS at 11:07

## 2022-11-11 NOTE — PROGRESS NOTES
DATE OF VISIT: 11/11/2022    REASON FOR VISIT: Followup for left kidney cancer     PROBLEM LIST:  1. 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  Presented with hematuria and back pain  B.  CT abdomen pelvis done January 16, 2022 revealed 6 cm left kidney mass  C.  Status post radical nephrectomy done by Dr. Obrien February 16, 2022  D.  Final pathology confirmed 6 cm, grade 2 clear cell carcinoma, invades renal vein, clear surgical margins, and no lymphovascular invasion.  E. Started adjuvant immunotherapy 3/18/2022, status post 10 cycles of treatment.    2.  Hypertension  3.  Postoperative pain  4.  Right upper lobe lung nodule, 1 cm:  A.  Incidentally noted on CT chest done January 20, 2022  5.  Family history of kidney cancer  6. Immune-therapy induced pruritis  7. POT1 gene mutation    HISTORY OF PRESENT ILLNESS: The patient is a very pleasant 57 y.o. male  with past medical history significant for left kidney cancer diagnosed February 2022.  He is status post radical left nephrectomy.  He was started on adjuvant Keytruda March 18, 2022.  The patient is here today for scheduled follow-up visit with treatment cycle #11.    SUBJECTIVE: Jefry is here today by himself.  Overall he is doing fairly well.  He had colonoscopy since last time I saw him there was mild inflammation in his bowel most likely due to by Keytruda.  He does have mild fatigue after each infusion.  His joint tenderness is stable.    Past History:  Medical History: has a past medical history of Cancer (HCC), Hematuria, History of transfusion (02/16/2022), Hypertension, Tattoo, Tinnitus, and Wears glasses.   Surgical History: has a past surgical history that includes Vasectomy; Nephrectomy (Left, 2/16/2022); and Colonoscopy (N/A, 11/4/2022).   Family History: family history includes Cancer in his brother and mother; Heart disease in his father and paternal uncle; Leukemia in his maternal aunt; Skin cancer in his maternal grandfather.  "  Social History: reports that he quit smoking about 6 years ago. His smoking use included cigarettes. He has a 6.00 pack-year smoking history. He quit smokeless tobacco use about 8 months ago.  His smokeless tobacco use included chew. He reports current alcohol use. He reports that he does not use drugs.    (Not in a hospital admission)     Allergies: Patient has no known allergies.     Review of Systems   Constitutional: Positive for fatigue.   Respiratory: Positive for shortness of breath.    Musculoskeletal: Positive for arthralgias.   Skin:        Dry skin       PHYSICAL EXAMINATION:   /83   Pulse 76   Temp 97.3 °F (36.3 °C) (Temporal)   Resp 18   Ht 180.3 cm (70.98\")   Wt 95.7 kg (211 lb)   SpO2 97%   BMI 29.44 kg/m²    Pain Score    11/11/22 0906   PainSc: 0-No pain     ECOG score: 0        ECOG Performance Status: 1 - Symptomatic but completely ambulatory  General Appearance:  alert, cooperative, no apparent distress and appears stated age   Lungs:   Clear to auscultation bilaterally; respirations regular, even, and unlabored bilaterally   Heart:  Regular rate and rhythm, no murmurs appreciated   Abdomen:   Soft, non-tender, non-distended and no organomegaly     Hospital Outpatient Visit on 11/11/2022   Component Date Value Ref Range Status   • WBC 11/11/2022 6.35  3.40 - 10.80 10*3/mm3 Final   • RBC 11/11/2022 4.68  4.14 - 5.80 10*6/mm3 Final   • Hemoglobin 11/11/2022 13.9  13.0 - 17.7 g/dL Final   • Hematocrit 11/11/2022 40.0  37.5 - 51.0 % Final   • MCV 11/11/2022 85.5  79.0 - 97.0 fL Final   • MCH 11/11/2022 29.7  26.6 - 33.0 pg Final   • MCHC 11/11/2022 34.8  31.5 - 35.7 g/dL Final   • RDW 11/11/2022 13.2  12.3 - 15.4 % Final   • RDW-SD 11/11/2022 41.0  37.0 - 54.0 fl Final   • MPV 11/11/2022 8.8  6.0 - 12.0 fL Final   • Platelets 11/11/2022 294  140 - 450 10*3/mm3 Final   • Neutrophil % 11/11/2022 63.2  42.7 - 76.0 % Final   • Lymphocyte % 11/11/2022 23.3  19.6 - 45.3 % Final   • Monocyte " % 2022 10.4  5.0 - 12.0 % Final   • Eosinophil % 2022 2.5  0.3 - 6.2 % Final   • Basophil % 2022 0.3  0.0 - 1.5 % Final   • Immature Grans % 2022 0.3  0.0 - 0.5 % Final   • Neutrophils, Absolute 2022 4.01  1.70 - 7.00 10*3/mm3 Final   • Lymphocytes, Absolute 2022 1.48  0.70 - 3.10 10*3/mm3 Final   • Monocytes, Absolute 2022 0.66  0.10 - 0.90 10*3/mm3 Final   • Eosinophils, Absolute 2022 0.16  0.00 - 0.40 10*3/mm3 Final   • Basophils, Absolute 2022 0.02  0.00 - 0.20 10*3/mm3 Final   • Immature Grans, Absolute 2022 0.02  0.00 - 0.05 10*3/mm3 Final   Admission on 2022, Discharged on 2022   Component Date Value Ref Range Status   • Reference Lab Report 2022    Final                    Value:[facility]  [address] [place] [address]  Phone: [phone] or [phone]  Fax: [phone]  Juan Ramon Wilde M.D., Medical Director    PATIENT NAME                           LABORATORY NO.  NAVYA ELY.                 [id number]  [id number]                         AGE              SEX  N           CLIENT REF #  [facility]            57      1965      [id number]   [id number]    [address]                REQUESTING M.D.     ATTENDING MABBIE     COPY TO.   [address]                        FATOU HIGGINBOTHAM ARTHUR  [place] [id number]                 Formerly [facility], [facility]–1983  DATE COLLECTED      DATE RECEIVED      DATE REPORTED  2022    DIAGNOSIS:  A.   TERMINAL ILEUM BIOPSY:  Scant small intestinal mucosa with nonspecific ulceration  Negative for specific microorganisms  Negative for dysplasia or malignancy  B.   SIGMOID COLON BIOPSY:  Colonic type mucosa with                           no significant histopathologic abnormalities  C.   CECUM BIOPSY:  Colonic type mucosa with no significant histopathologic abnormalities  D.    "TRANSVERSE COLON BIOPSY:  Colonic type mucosa with no significant histopathologic abnormalities  E.   DESCENDING COLON BIOPSY:  Colonic type mucosa with no significant histopathologic abnormalities  F.   RECTAL BIOPSY:  Colonic type mucosa with no significant histopathologic abnormalities  G.   DESCENDING COLON POLYP:  Adenomatous polyp (tubular adenoma)  Negative for high-grade dysplasia  H.   RECTOSIGMOID COLON, POLYP:  Hyperplastic polyp    YONNY    CLINICAL HISTORY:  Encounter for screening for malignant neoplasm of colon    SPECIMENS RECEIVED:  A.  TERMINAL ILEUM BIOPSY  B.  SIGMOID COLON BIOPSY  C.  CECUM BIOPSY  D.  TRANSVERSE COLON BIOPSY  E.  DESCENDING COLON BIOPSY  F.  RECTAL BIOPSY  G.  DESCENDING COLON POLYP  H.  RECTOSIGMOID COLON, POLYP    MICROSCOPIC DESCRIPTION:  Tissue blocks are prepared and slides are examined microscopically on                           all  specimens. See diagnosis for details.    Professional interpretation rendered by Jaskaran Leigh M.D., F.C.A.P. at [facility], [address] [place] [address].    GROSS DESCRIPTION:  A.  Specimen is received in 1 formalin filled container \"terminal ileum biopsy\"  and consists of a single piece of tan soft tissue measuring 0.1 x 0.1 x 0.1  cm.  Specimen submitted entirely in 1 cassette.  B.  Specimen is received in 1 formalin filled container \"sigmoid colon biopsy\"  and consists of 3 pieces of tan soft tissue measuring 0.6 x 0.3 x 0.1 cm.  Specimen is submitted entirely in 1 cassette.  C.  Specimen is received in 1 formalin filled container \"cecum and ascending  biopsy\" and consists of multiple pieces of tan soft tissue measuring 0.6 x  0.3 x 0.1 cm.  Specimen is submitted entirely in 1 cassette.  D.  Specimen is received in 1 formalin filled container \"transverse colon  biopsy\" and consists of 2 pieces of tan soft tissue measuring 0.4 x 0.3 x 0.1  cm.  Specimen is                           submitted entirely in 1 cassette.  E.  " "Specimen is received in 1 formalin filled container \"descending colon  biopsy\" and consists of 3 pieces of tan soft tissue measuring 0.2 x 0.2 x 0.1  cm.  Specimen is submitted entirely in 1 cassette.  F.  Specimen is received in 1 formalin filled container \"rectum biopsy\" and  consists of 2 pieces of tan soft tissue measuring 0.3 x 0.2 x 0.1 cm.  Specimen is submitted entirely in 1 cassette.  G.  Specimen is received in 1 formalin filled container \"descending colon  polyp\" and consists of a single piece of tan soft tissue measuring 0.9 x 0.4  x 0.2 cm.  Specimen is trisected and submitted sequentially entirely in 1  cassette.  H.  Specimen is received in 1 formalin filled container \"rectal sigmoid polyp\"  consists of a single piece of tan soft tissue measuring 0.5 x 0.5 x 0.3 cm.  Specimen is submitted entirely in 1 cassette.  MM    REVIEWED, DIAGNOSED AND ELECTRONICALLY  SIGNED BY:    Jaskaran Leigh M.D., LUBNAAAlonsoPAlonso  CPT CODES:  88305x8          No results found.    ASSESSMENT: The patient is a very pleasant 57 y.o. male  with left kidney cancer      PLAN:    1.  Left kidney cancer:  A.  I will proceed with adjuvant Keytruda 20 mg IV every 3 weeks cycle # 12 today.  B.  The patient will follow up with us in 3 weeks for cycle # 13.   C.  We will plan to finish 2 years of treatment if tolerated.    D.  I will continue to monitor the patient's blood work including blood counts, kidney function, liver functions, and electrolytes. We will also continue to monitor thyroid studies periodically.   E.  I reviewed the lab results from today with the patient.  I reassured him that his blood counts including white blood cell count, hemoglobin, and platelet count are all within normal limits.  We will follow-up on his CMP and thyroid studies from today.  I did review with him that his creatinine from 9/30/2022 was improved to 1.66.  F. We reviewed again the potential side effects of immunotherapy including but not limited to " immune mediated reactions with thyroiditis, pneumonitis, hepatitis, colitis, rash, and electrolyte abnormalities, fatigue, multiorgan failure, and possibly death.  G.  We will plan to repeat his CT scans in 6 months that will be due November 2022, and ordered for prior to return.     2.  Right upper lobe lung nodule:  A. The right upper lobe lung nodule was stable in size at 10 mm on most recent CT done 5/13/2022.  We will see what this looks like on next set of scans ordered for prior to return.  B. He will follow up with Dr. Neff from pulmonary.     3.  Treatment induced nausea:  A.  He will continue use of Zofran as needed for treatment induced nausea.    4.  Hypertension:  A.  I will continue Norvasc 5 mg daily.  B.  We will monitor the patient blood pressure while on treatment.  He would be at risk for hypotension as well as hypertension.    5. Immune-therapy induced pruritis:  A. I encouraged him to keep his skin well moisturized. He will monitor for rash or worsening symptoms.       6. POT1 Gene mutation:  A. I recommended he have annual skin exam due to higher risk for melanoma.   B. We will continue surveillance CT scans for renal cell carcinoma.   C. We will consider MRI brain in the future if he has symptoms.     7.  Elevated creatinine:  A.  I did go over his creatinine result from October 21, 2022 it was elevated 1.78.  B.  The patient was advised to continue to increase fluid intake.  C.  He will continue to avoid nephrotoxic agents.  D.  If his creatinine increases we may have to hold treatment and administer steroids for possibility of autoimmune nephritis.  E.  He has follow-up scheduled with Dr. Obrien with urology next month for ongoing evaluation.    FOLLOW UP: 3 weeks with next cycle of treatment and scans prior to return.    Garrett Pizano MD  11/11/2022

## 2022-11-23 ENCOUNTER — APPOINTMENT (OUTPATIENT)
Dept: CT IMAGING | Facility: HOSPITAL | Age: 57
End: 2022-11-23

## 2022-11-23 ENCOUNTER — HOSPITAL ENCOUNTER (OUTPATIENT)
Dept: CT IMAGING | Facility: HOSPITAL | Age: 57
Discharge: HOME | End: 2022-11-23

## 2022-11-23 DIAGNOSIS — C64.2 CARCINOMA OF LEFT KIDNEY: ICD-10-CM

## 2022-11-23 PROCEDURE — 71260 CT THORAX DX C+: CPT

## 2022-11-23 PROCEDURE — 74177 CT ABD & PELVIS W/CONTRAST: CPT

## 2022-11-23 PROCEDURE — 25010000002 IOPAMIDOL 61 % SOLUTION: Performed by: NURSE PRACTITIONER

## 2022-11-23 RX ADMIN — IOPAMIDOL 100 ML: 612 INJECTION, SOLUTION INTRAVENOUS at 13:56

## 2022-11-28 ENCOUNTER — OFFICE VISIT (OUTPATIENT)
Dept: UROLOGY | Facility: CLINIC | Age: 57
End: 2022-11-28

## 2022-11-28 VITALS
SYSTOLIC BLOOD PRESSURE: 124 MMHG | DIASTOLIC BLOOD PRESSURE: 78 MMHG | OXYGEN SATURATION: 97 % | BODY MASS INDEX: 29.54 KG/M2 | WEIGHT: 211 LBS | HEART RATE: 67 BPM | HEIGHT: 71 IN | TEMPERATURE: 99.1 F

## 2022-11-28 DIAGNOSIS — C64.2 RENAL CELL CARCINOMA OF LEFT KIDNEY: Primary | ICD-10-CM

## 2022-11-28 PROCEDURE — 99213 OFFICE O/P EST LOW 20 MIN: CPT | Performed by: UROLOGY

## 2022-11-28 NOTE — PROGRESS NOTES
"Chief Complaint   Patient presents with   • Carcinoma of left kidney     6 month fu w/ CT and labs      HPI  Ms. Newberry is a 57 y.o. male with history below in assessment, who presents for follow up.     At this visit patient doing well.     Past Medical History:   Diagnosis Date   • Cancer (HCC)     renal cell carcinoma:  on immunotherapy   • Hematuria    • History of transfusion 02/16/2022    pt states he had transfusion during surgery to remove kidney   • Hypertension    • Tattoo    • Tinnitus    • Wears glasses        Past Surgical History:   Procedure Laterality Date   • COLONOSCOPY N/A 11/4/2022    Procedure: COLONOSCOPY, BIOPSIES, POLYPECTOMY;  Surgeon: Rico Pizarro MD;  Location: [facility] ENDOSCOPY;  Service: Gastroenterology;  Laterality: N/A;   • NEPHRECTOMY Left 2/16/2022    Procedure: OPEN LEFT RADICAL NEPHRECTOMY WITH LEFT ADRENALECTOMY;  Surgeon: Manjinder Obrien MD;  Location: [facility] OR;  Service: Urology;  Laterality: Left;   • VASECTOMY           Current Outpatient Medications:   •  amLODIPine (NORVASC) 5 MG tablet, Take 1 tablet by mouth Daily., Disp: 90 tablet, Rfl: 3     Physical Exam  Visit Vitals  /78 (BP Location: Left arm, Patient Position: Sitting, Cuff Size: Adult)   Pulse 67   Temp 99.1 °F (37.3 °C) (Temporal)   Ht 180.3 cm (70.98\")   Wt 95.7 kg (211 lb)   SpO2 97%   BMI 29.45 kg/m²       Labs  Brief Urine Lab Results  (Last result in the past 365 days)      Color   Clarity   Blood   Leuk Est   Nitrite   Protein   CREAT   Urine HCG        01/16/22 1903 Red   Cloudy   Large (3+)   Moderate (2+)   Positive   >=300 mg/dL (3+)                 Lab Results   Component Value Date    GLUCOSE 114 (H) 11/11/2022    CALCIUM 10.0 11/11/2022     11/11/2022    K 4.8 11/11/2022    CO2 25.0 11/11/2022     11/11/2022    BUN 20 11/11/2022    CREATININE 1.75 (H) 11/11/2022    EGFRIFAFRI 50 (L) 02/24/2022    EGFRIFNONA 42 (L) 02/24/2022    BCR 11.4 11/11/2022    ANIONGAP 12.0 " 11/11/2022       Lab Results   Component Value Date    WBC 6.35 11/11/2022    HGB 13.9 11/11/2022    HCT 40.0 11/11/2022    MCV 85.5 11/11/2022     11/11/2022       Urine Culture    Urine Culture 1/8/22   Urine Culture Final report              Lab Results   Component Value Date    PSA 0.5 06/17/2022    PSA 0.705 08/14/2018             Radiographic Studies  CT Chest With Contrast Diagnostic  Result Date: 11/23/2022  1. Stable postsurgical changes in the left nephrectomy bed. 2. No evidence of progression or recurrence in the abdomen or pelvis. 3. Findings are concerning for atypical viral pneumonia or acute infectious/inflammatory process. This limits evaluation for metastatic disease in the chest. A short-term follow-up CT scan of the chest is recommended in one month after patient is treated. This finding was called to Dr. Pizano on 11/23/2022 at 3:04 PM.  This study was performed with techniques to keep radiation doses as low as reasonably achievable (ALARA). Individualized dose reduction techniques using automated exposure control or adjustment of mA and/or kV according to the patient size were employed.     Images were reviewed, interpreted, and dictated by VIVIANE Salinas Transcribed by Iman Bedolla PA-C.  This report was signed and finalized on 11/23/2022 4:17 PM by VIVIANE Flowers.    CT Abdomen Pelvis With Contrast  Result Date: 11/23/2022  1. Stable postsurgical changes in the left nephrectomy bed. 2. No evidence of progression or recurrence in the abdomen or pelvis. 3. Findings are concerning for atypical viral pneumonia or acute infectious/inflammatory process. This limits evaluation for metastatic disease in the chest. A short-term follow-up CT scan of the chest is recommended in one month after patient is treated. This finding was called to Dr. Pizano on 11/23/2022 at 3:04 PM.  This study was performed with techniques to keep radiation doses as low as reasonably achievable  (PAUL). Individualized dose reduction techniques using automated exposure control or adjustment of mA and/or kV according to the patient size were employed.     Images were reviewed, interpreted, and dictated by VIVIANE Salinas Transcribed by Iman Bedolla PA-C.  This report was signed and finalized on 11/23/2022 4:17 PM by VIVIANE Flowers.        I have reviewed above labs and imaging.     Assessment  57 y.o. male with pT3a clear cell renal cell carcinoma, grade 2 (stage III), status post open radical nephrectomy and adrenalectomy 2/16/2022.  Margins all negative.  He is receiving adjuvant pembrolizumab.  No evidence of disease recurrence on CT scans.     Plan  1.  Follow-up in 6 months with repeat CT chest abdomen pelvis, along with BMP.  He does have CKD 3.  He will need repeat imaging every 6 months for the first 2 years at least, therefore would like to alternate him receiving contrast to minimize the burden of his kidney.

## 2022-11-30 ENCOUNTER — OFFICE VISIT (OUTPATIENT)
Dept: GASTROENTEROLOGY | Facility: CLINIC | Age: 57
End: 2022-11-30

## 2022-11-30 VITALS
DIASTOLIC BLOOD PRESSURE: 80 MMHG | WEIGHT: 217 LBS | HEIGHT: 71 IN | BODY MASS INDEX: 30.38 KG/M2 | SYSTOLIC BLOOD PRESSURE: 122 MMHG

## 2022-11-30 DIAGNOSIS — D12.4 ADENOMATOUS POLYP OF DESCENDING COLON: ICD-10-CM

## 2022-11-30 DIAGNOSIS — K21.9 GASTROESOPHAGEAL REFLUX DISEASE WITHOUT ESOPHAGITIS: ICD-10-CM

## 2022-11-30 DIAGNOSIS — K63.3 ULCER OF ILEUM: Primary | ICD-10-CM

## 2022-11-30 PROCEDURE — 99213 OFFICE O/P EST LOW 20 MIN: CPT | Performed by: INTERNAL MEDICINE

## 2022-11-30 NOTE — PROGRESS NOTES
Follow Up Note     Date: 2022   Patient Name: Tobias Newberry  MRN: [id number]  : 1965     Referring Physician: Nabeel Bullock MD    Chief Complaint:    Chief Complaint   Patient presents with   • Follow-up   • Colon Polyps       Interval History:   2022  Tobias Newberry is a 57 y.o. male who is here today for follow up after his recent colonoscopy.  He states that he is doing well since the procedure. He had a recent colonoscopy on 2022 for colon cancer screening.  Colonoscopy revealed large ulcerated area in the terminal ileum.  He is here for follow-up.     He denies any abdominal pain has been having regular bowel movement.  No nausea vomiting.  He has occasional reflux symptoms and takes over-the-counter antacids as needed.  No history of anemia.  He is currently on immunotherapy following nephrectomy for renal cell carcinoma.  His mom had kidney cancer as well.  No prior EGD.  Family history of any colon cancer.  Stop smoking cigarette and now states that he stopped smoking vaping as well.  No family history of any IBD    Subjective      Past Medical History:   Past Medical History:   Diagnosis Date   • Cancer (HCC)     renal cell carcinoma:  on immunotherapy   • Hematuria    • History of transfusion 2022    pt states he had transfusion during surgery to remove kidney   • Hypertension    • Tattoo    • Tinnitus    • Wears glasses      Past Surgical History:   Past Surgical History:   Procedure Laterality Date   • COLONOSCOPY N/A 2022    Procedure: COLONOSCOPY, BIOPSIES, POLYPECTOMY;  Surgeon: Rico Pizarro MD;  Location: [facility] ENDOSCOPY;  Service: Gastroenterology;  Laterality: N/A;   • NEPHRECTOMY Left 2022    Procedure: OPEN LEFT RADICAL NEPHRECTOMY WITH LEFT ADRENALECTOMY;  Surgeon: Manjinder Obrien MD;  Location: [facility] OR;  Service: Urology;  Laterality: Left;   • VASECTOMY         Family History:   Family History   Problem Relation Age  "of Onset   • Cancer Mother    • Heart disease Father    • Cancer Brother    • Leukemia Maternal Aunt    • Heart disease Paternal Uncle    • Skin cancer Maternal Grandfather        Social History:   Social History     Socioeconomic History   • Marital status:    Tobacco Use   • Smoking status: Former     Packs/day: 1.50     Years: 4.00     Pack years: 6.00     Types: Cigarettes     Quit date: 1/10/2016     Years since quittin.8   • Smokeless tobacco: Former     Types: Chew     Quit date: 3/8/2022   Vaping Use   • Vaping Use: Some days   • Substances: Nicotine   • Devices: Ref[employer] Me Laterble tank   Substance and Sexual Activity   • Alcohol use: Yes     Comment: SOCIAL    • Drug use: No   • Sexual activity: Defer     Partners: Female       Medications:     Current Outpatient Medications:   •  amLODIPine (NORVASC) 5 MG tablet, Take 1 tablet by mouth Daily., Disp: 90 tablet, Rfl: 3  •  INV 1418 pembrolizumab 200 mg in sodium chloride 0.9 % 50 mL, Infuse 200 mg into a venous catheter Every 21 (Twenty-One) Days., Disp: , Rfl:     Allergies:   No Known Allergies    Review of Systems:   Review of Systems   Constitutional: Negative for appetite change, fatigue, fever and unexpected weight loss.   HENT: Negative for trouble swallowing.    Gastrointestinal: Negative for abdominal distention, abdominal pain, anal bleeding, blood in stool, constipation, diarrhea, nausea, rectal pain, vomiting, GERD and indigestion.       The following portions of the patient's history were reviewed and updated as appropriate: allergies, current medications, past family history, past medical history, past social history, past surgical history and problem list.    Objective     Physical Exam:  Vital Signs:   Vitals:    22 1613   BP: 122/80   Weight: 98.4 kg (217 lb)   Height: 180.3 cm (71\")       Physical Exam  Constitutional:       Appearance: He is obese.   HENT:      Head: Normocephalic and atraumatic.   Eyes:      Conjunctiva/sclera: " Conjunctivae normal.   Abdominal:      General: Abdomen is flat. There is no distension.      Palpations: There is no mass.      Tenderness: There is no abdominal tenderness. There is no guarding or rebound.      Hernia: No hernia is present.   Musculoskeletal:      Cervical back: Normal range of motion and neck supple.   Neurological:      Mental Status: He is alert.         Results Review:   I reviewed the patient's new clinical results.    Hospital Outpatient Visit on 11/11/2022   Component Date Value Ref Range Status   • Glucose 11/11/2022 114 (H)  65 - 99 mg/dL Final   • BUN 11/11/2022 20  6 - 20 mg/dL Final   • Creatinine 11/11/2022 1.75 (H)  0.76 - 1.27 mg/dL Final   • Sodium 11/11/2022 138  136 - 145 mmol/L Final   • Potassium 11/11/2022 4.8  3.5 - 5.2 mmol/L Final    Slight hemolysis detected by analyzer. Results may be affected.   • Chloride 11/11/2022 101  98 - 107 mmol/L Final   • CO2 11/11/2022 25.0  22.0 - 29.0 mmol/L Final   • Calcium 11/11/2022 10.0  8.6 - 10.5 mg/dL Final   • Total Protein 11/11/2022 7.4  6.0 - 8.5 g/dL Final   • Albumin 11/11/2022 4.20  3.50 - 5.20 g/dL Final   • ALT (SGPT) 11/11/2022 12  1 - 41 U/L Final   • AST (SGOT) 11/11/2022 15  1 - 40 U/L Final   • Alkaline Phosphatase 11/11/2022 87  39 - 117 U/L Final   • Total Bilirubin 11/11/2022 0.3  0.0 - 1.2 mg/dL Final   • Globulin 11/11/2022 3.2  gm/dL Final    Calculated Result   • A/G Ratio 11/11/2022 1.3  g/dL Final   • BUN/Creatinine Ratio 11/11/2022 11.4  7.0 - 25.0 Final   • Anion Gap 11/11/2022 12.0  5.0 - 15.0 mmol/L Final   • eGFR 11/11/2022 44.9 (L)  >60.0 mL/min/1.73 Final    National Kidney Foundation and American Society of Nephrology (ASN) Task Force recommended calculation based on the Chronic Kidney Disease Epidemiology Collaboration (CKD-EPI) equation refit without adjustment for race.   • WBC 11/11/2022 6.35  3.40 - 10.80 10*3/mm3 Final   • RBC 11/11/2022 4.68  4.14 - 5.80 10*6/mm3 Final   • Hemoglobin 11/11/2022  13.9  13.0 - 17.7 g/dL Final   • Hematocrit 2022 40.0  37.5 - 51.0 % Final   • MCV 2022 85.5  79.0 - 97.0 fL Final   • MCH 2022 29.7  26.6 - 33.0 pg Final   • MCHC 2022 34.8  31.5 - 35.7 g/dL Final   • RDW 2022 13.2  12.3 - 15.4 % Final   • RDW-SD 2022 41.0  37.0 - 54.0 fl Final   • MPV 2022 8.8  6.0 - 12.0 fL Final   • Platelets 2022 294  140 - 450 10*3/mm3 Final   • Neutrophil % 2022 63.2  42.7 - 76.0 % Final   • Lymphocyte % 2022 23.3  19.6 - 45.3 % Final   • Monocyte % 2022 10.4  5.0 - 12.0 % Final   • Eosinophil % 2022 2.5  0.3 - 6.2 % Final   • Basophil % 2022 0.3  0.0 - 1.5 % Final   • Immature Grans % 2022 0.3  0.0 - 0.5 % Final   • Neutrophils, Absolute 2022 4.01  1.70 - 7.00 10*3/mm3 Final   • Lymphocytes, Absolute 2022 1.48  0.70 - 3.10 10*3/mm3 Final   • Monocytes, Absolute 2022 0.66  0.10 - 0.90 10*3/mm3 Final   • Eosinophils, Absolute 2022 0.16  0.00 - 0.40 10*3/mm3 Final   • Basophils, Absolute 2022 0.02  0.00 - 0.20 10*3/mm3 Final   • Immature Grans, Absolute 2022 0.02  0.00 - 0.05 10*3/mm3 Final   Admission on 2022, Discharged on 2022   Component Date Value Ref Range Status   • Reference Lab Report 2022    Final                    Value:[facility]  [address] [place]  [address]  Phone: [phone] or [phone]  Fax: [phone]  Juan Ramon Wilde M.D., Medical Director    PATIENT NAME                           LABORATORY NO.  [id number]  NAVYA LEARY.                 [id number]  [id number]                         AGE              SEX  SSN           CLIENT REF #  [facility]            57      1965      [id number]   [id number]    [address]                REQUESTING M.D.     ATTENDING M.D.     COPY TO.   [address]                        FATOU HIGGINBOTHAM ARTHUR  [place] [id number]      "            JAGANNATH  DATE COLLECTED      DATE RECEIVED      DATE REPORTED  11/04/2022 11/04/2022 11/07/2022    DIAGNOSIS:  A.   TERMINAL ILEUM BIOPSY:  Scant small intestinal mucosa with nonspecific ulceration  Negative for specific microorganisms  Negative for dysplasia or malignancy  B.   SIGMOID COLON BIOPSY:  Colonic type mucosa with                           no significant histopathologic abnormalities  C.   CECUM BIOPSY:  Colonic type mucosa with no significant histopathologic abnormalities  D.   TRANSVERSE COLON BIOPSY:  Colonic type mucosa with no significant histopathologic abnormalities  E.   DESCENDING COLON BIOPSY:  Colonic type mucosa with no significant histopathologic abnormalities  F.   RECTAL BIOPSY:  Colonic type mucosa with no significant histopathologic abnormalities  G.   DESCENDING COLON POLYP:  Adenomatous polyp (tubular adenoma)  Negative for high-grade dysplasia  H.   RECTOSIGMOID COLON, POLYP:  Hyperplastic polyp    YONNY    CLINICAL HISTORY:  Encounter for screening for malignant neoplasm of colon    SPECIMENS RECEIVED:  A.  TERMINAL ILEUM BIOPSY  B.  SIGMOID COLON BIOPSY  C.  CECUM BIOPSY  D.  TRANSVERSE COLON BIOPSY  E.  DESCENDING COLON BIOPSY  F.  RECTAL BIOPSY  G.  DESCENDING COLON POLYP  H.  RECTOSIGMOID COLON, POLYP    MICROSCOPIC DESCRIPTION:  Tissue blocks are prepared and slides are examined microscopically on                           all  specimens. See diagnosis for details.    Professional interpretation rendered by Jaskaran Leigh M.D., F.C.A.P. at [facility] [employer], [address] [place] [address].    GROSS DESCRIPTION:  A.  Specimen is received in 1 formalin filled container \"terminal ileum biopsy\"  and consists of a single piece of tan soft tissue measuring 0.1 x 0.1 x 0.1  cm.  Specimen submitted entirely in 1 cassette.  B.  Specimen is received in 1 formalin filled container \"sigmoid colon biopsy\"  and consists of 3 pieces of tan soft tissue " "measuring 0.6 x 0.3 x 0.1 cm.  Specimen is submitted entirely in 1 cassette.  C.  Specimen is received in 1 formalin filled container \"cecum and ascending  biopsy\" and consists of multiple pieces of tan soft tissue measuring 0.6 x  0.3 x 0.1 cm.  Specimen is submitted entirely in 1 cassette.  D.  Specimen is received in 1 formalin filled container \"transverse colon  biopsy\" and consists of 2 pieces of tan soft tissue measuring 0.4 x 0.3 x 0.1  cm.  Specimen is                           submitted entirely in 1 cassette.  E.  Specimen is received in 1 formalin filled container \"descending colon  biopsy\" and consists of 3 pieces of tan soft tissue measuring 0.2 x 0.2 x 0.1  cm.  Specimen is submitted entirely in 1 cassette.  F.  Specimen is received in 1 formalin filled container \"rectum biopsy\" and  consists of 2 pieces of tan soft tissue measuring 0.3 x 0.2 x 0.1 cm.  Specimen is submitted entirely in 1 cassette.  G.  Specimen is received in 1 formalin filled container \"descending colon  polyp\" and consists of a single piece of tan soft tissue measuring 0.9 x 0.4  x 0.2 cm.  Specimen is trisected and submitted sequentially entirely in 1  cassette.  H.  Specimen is received in 1 formalin filled container \"rectal sigmoid polyp\"  consists of a single piece of tan soft tissue measuring 0.5 x 0.5 x 0.3 cm.  Specimen is submitted entirely in 1 cassette.  MM    REVIEWED, DIAGNOSED AND ELECTRONICALLY  SIGNED BY:    Jaskaran Leigh M.D., F.C.A.P.  CPT CODES:  88305x8     Hospital Outpatient Visit on 10/21/2022   Component Date Value Ref Range Status   • Glucose 10/21/2022 112 (H)  65 - 99 mg/dL Final   • BUN 10/21/2022 22 (H)  6 - 20 mg/dL Final   • Creatinine 10/21/2022 1.78 (H)  0.76 - 1.27 mg/dL Final   • Sodium 10/21/2022 139  136 - 145 mmol/L Final   • Potassium 10/21/2022 4.6  3.5 - 5.2 mmol/L Final    Slight hemolysis detected by analyzer. Results may be affected.   • Chloride 10/21/2022 102  98 - 107 mmol/L Final   • " CO2 10/21/2022 25.0  22.0 - 29.0 mmol/L Final   • Calcium 10/21/2022 9.4  8.6 - 10.5 mg/dL Final   • Total Protein 10/21/2022 7.1  6.0 - 8.5 g/dL Final   • Albumin 10/21/2022 4.20  3.50 - 5.20 g/dL Final   • ALT (SGPT) 10/21/2022 12  1 - 41 U/L Final   • AST (SGOT) 10/21/2022 13  1 - 40 U/L Final   • Alkaline Phosphatase 10/21/2022 93  39 - 117 U/L Final   • Total Bilirubin 10/21/2022 0.3  0.0 - 1.2 mg/dL Final   • Globulin 10/21/2022 2.9  gm/dL Final    Calculated Result   • A/G Ratio 10/21/2022 1.4  g/dL Final   • BUN/Creatinine Ratio 10/21/2022 12.4  7.0 - 25.0 Final   • Anion Gap 10/21/2022 12.0  5.0 - 15.0 mmol/L Final   • eGFR 10/21/2022 43.9 (L)  >60.0 mL/min/1.73 Final    National Kidney Foundation and American Society of Nephrology (ASN) Task Force recommended calculation based on the Chronic Kidney Disease Epidemiology Collaboration (CKD-EPI) equation refit without adjustment for race.   • TSH 10/21/2022 1.190  0.270 - 4.200 uIU/mL Final   • Free T4 10/21/2022 1.32  0.93 - 1.70 ng/dL Final   • WBC 10/21/2022 7.52  3.40 - 10.80 10*3/mm3 Final   • RBC 10/21/2022 4.62  4.14 - 5.80 10*6/mm3 Final   • Hemoglobin 10/21/2022 13.6  13.0 - 17.7 g/dL Final   • Hematocrit 10/21/2022 40.5  37.5 - 51.0 % Final   • MCV 10/21/2022 87.7  79.0 - 97.0 fL Final   • MCH 10/21/2022 29.4  26.6 - 33.0 pg Final   • MCHC 10/21/2022 33.6  31.5 - 35.7 g/dL Final   • RDW 10/21/2022 13.2  12.3 - 15.4 % Final   • RDW-SD 10/21/2022 42.8  37.0 - 54.0 fl Final   • MPV 10/21/2022 8.6  6.0 - 12.0 fL Final   • Platelets 10/21/2022 277  140 - 450 10*3/mm3 Final   • Neutrophil % 10/21/2022 73.1  42.7 - 76.0 % Final   • Lymphocyte % 10/21/2022 15.3 (L)  19.6 - 45.3 % Final   • Monocyte % 10/21/2022 9.4  5.0 - 12.0 % Final   • Eosinophil % 10/21/2022 2.0  0.3 - 6.2 % Final   • Basophil % 10/21/2022 0.1  0.0 - 1.5 % Final   • Immature Grans % 10/21/2022 0.1  0.0 - 0.5 % Final   • Neutrophils, Absolute 10/21/2022 5.49  1.70 - 7.00 10*3/mm3 Final    • Lymphocytes, Absolute 10/21/2022 1.15  0.70 - 3.10 10*3/mm3 Final   • Monocytes, Absolute 10/21/2022 0.71  0.10 - 0.90 10*3/mm3 Final   • Eosinophils, Absolute 10/21/2022 0.15  0.00 - 0.40 10*3/mm3 Final   • Basophils, Absolute 10/21/2022 0.01  0.00 - 0.20 10*3/mm3 Final   • Immature Grans, Absolute 10/21/2022 0.01  0.00 - 0.05 10*3/mm3 Final   Hospital Outpatient Visit on 09/30/2022   Component Date Value Ref Range Status   • Glucose 09/30/2022 137 (H)  65 - 99 mg/dL Final   • BUN 09/30/2022 27 (H)  6 - 20 mg/dL Final   • Creatinine 09/30/2022 1.66 (H)  0.76 - 1.27 mg/dL Final   • Sodium 09/30/2022 139  136 - 145 mmol/L Final   • Potassium 09/30/2022 4.9  3.5 - 5.2 mmol/L Final   • Chloride 09/30/2022 103  98 - 107 mmol/L Final   • CO2 09/30/2022 26.0  22.0 - 29.0 mmol/L Final   • Calcium 09/30/2022 9.5  8.6 - 10.5 mg/dL Final   • Total Protein 09/30/2022 7.2  6.0 - 8.5 g/dL Final   • Albumin 09/30/2022 4.10  3.50 - 5.20 g/dL Final   • ALT (SGPT) 09/30/2022 12  1 - 41 U/L Final   • AST (SGOT) 09/30/2022 15  1 - 40 U/L Final   • Alkaline Phosphatase 09/30/2022 80  39 - 117 U/L Final   • Total Bilirubin 09/30/2022 0.3  0.0 - 1.2 mg/dL Final   • Globulin 09/30/2022 3.1  gm/dL Final    Calculated Result   • A/G Ratio 09/30/2022 1.3  g/dL Final   • BUN/Creatinine Ratio 09/30/2022 16.3  7.0 - 25.0 Final   • Anion Gap 09/30/2022 10.0  5.0 - 15.0 mmol/L Final   • eGFR 09/30/2022 47.8 (L)  >60.0 mL/min/1.73 Final    National Kidney Foundation and American Society of Nephrology (ASN) Task Force recommended calculation based on the Chronic Kidney Disease Epidemiology Collaboration (CKD-EPI) equation refit without adjustment for race.   • WBC 09/30/2022 6.71  3.40 - 10.80 10*3/mm3 Final   • RBC 09/30/2022 4.68  4.14 - 5.80 10*6/mm3 Final   • Hemoglobin 09/30/2022 14.0  13.0 - 17.7 g/dL Final   • Hematocrit 09/30/2022 40.4  37.5 - 51.0 % Final   • MCV 09/30/2022 86.3  79.0 - 97.0 fL Final   • MCH 09/30/2022 29.9  26.6 -  33.0 pg Final   • MCHC 09/30/2022 34.7  31.5 - 35.7 g/dL Final   • RDW 09/30/2022 12.7  12.3 - 15.4 % Final   • RDW-SD 09/30/2022 40.3  37.0 - 54.0 fl Final   • MPV 09/30/2022 8.7  6.0 - 12.0 fL Final   • Platelets 09/30/2022 275  140 - 450 10*3/mm3 Final   • Neutrophil % 09/30/2022 69.1  42.7 - 76.0 % Final   • Lymphocyte % 09/30/2022 17.6 (L)  19.6 - 45.3 % Final   • Monocyte % 09/30/2022 10.1  5.0 - 12.0 % Final   • Eosinophil % 09/30/2022 2.7  0.3 - 6.2 % Final   • Basophil % 09/30/2022 0.4  0.0 - 1.5 % Final   • Immature Grans % 09/30/2022 0.1  0.0 - 0.5 % Final   • Neutrophils, Absolute 09/30/2022 4.63  1.70 - 7.00 10*3/mm3 Final   • Lymphocytes, Absolute 09/30/2022 1.18  0.70 - 3.10 10*3/mm3 Final   • Monocytes, Absolute 09/30/2022 0.68  0.10 - 0.90 10*3/mm3 Final   • Eosinophils, Absolute 09/30/2022 0.18  0.00 - 0.40 10*3/mm3 Final   • Basophils, Absolute 09/30/2022 0.03  0.00 - 0.20 10*3/mm3 Final   • Immature Grans, Absolute 09/30/2022 0.01  0.00 - 0.05 10*3/mm3 Final   Hospital Outpatient Visit on 09/09/2022   Component Date Value Ref Range Status   • Glucose 09/09/2022 118 (H)  65 - 99 mg/dL Final   • BUN 09/09/2022 16  6 - 20 mg/dL Final   • Creatinine 09/09/2022 1.62 (H)  0.76 - 1.27 mg/dL Final   • Sodium 09/09/2022 138  136 - 145 mmol/L Final   • Potassium 09/09/2022 4.5  3.5 - 5.2 mmol/L Final   • Chloride 09/09/2022 101  98 - 107 mmol/L Final   • CO2 09/09/2022 25.0  22.0 - 29.0 mmol/L Final   • Calcium 09/09/2022 9.7  8.6 - 10.5 mg/dL Final   • Total Protein 09/09/2022 7.1  6.0 - 8.5 g/dL Final   • Albumin 09/09/2022 4.20  3.50 - 5.20 g/dL Final   • ALT (SGPT) 09/09/2022 11  1 - 41 U/L Final   • AST (SGOT) 09/09/2022 15  1 - 40 U/L Final   • Alkaline Phosphatase 09/09/2022 83  39 - 117 U/L Final   • Total Bilirubin 09/09/2022 0.4  0.0 - 1.2 mg/dL Final   • Globulin 09/09/2022 2.9  gm/dL Final    Calculated Result   • A/G Ratio 09/09/2022 1.4  g/dL Final   • BUN/Creatinine Ratio 09/09/2022 9.9  7.0  - 25.0 Final   • Anion Gap 09/09/2022 12.0  5.0 - 15.0 mmol/L Final   • eGFR 09/09/2022 49.2 (L)  >60.0 mL/min/1.73 Final    National Kidney Foundation and American Society of Nephrology (ASN) Task Force recommended calculation based on the Chronic Kidney Disease Epidemiology Collaboration (CKD-EPI) equation refit without adjustment for race.   • TSH 09/09/2022 1.190  0.270 - 4.200 uIU/mL Final   • Free T4 09/09/2022 1.50  0.93 - 1.70 ng/dL Final   • WBC 09/09/2022 7.74  3.40 - 10.80 10*3/mm3 Final   • RBC 09/09/2022 4.87  4.14 - 5.80 10*6/mm3 Final   • Hemoglobin 09/09/2022 14.6  13.0 - 17.7 g/dL Final   • Hematocrit 09/09/2022 42.0  37.5 - 51.0 % Final   • MCV 09/09/2022 86.2  79.0 - 97.0 fL Final   • MCH 09/09/2022 30.0  26.6 - 33.0 pg Final   • MCHC 09/09/2022 34.8  31.5 - 35.7 g/dL Final   • RDW 09/09/2022 12.8  12.3 - 15.4 % Final   • RDW-SD 09/09/2022 41.0  37.0 - 54.0 fl Final   • MPV 09/09/2022 8.6  6.0 - 12.0 fL Final   • Platelets 09/09/2022 294  140 - 450 10*3/mm3 Final   • Neutrophil % 09/09/2022 74.5  42.7 - 76.0 % Final   • Lymphocyte % 09/09/2022 13.8 (L)  19.6 - 45.3 % Final   • Monocyte % 09/09/2022 9.3  5.0 - 12.0 % Final   • Eosinophil % 09/09/2022 1.9  0.3 - 6.2 % Final   • Basophil % 09/09/2022 0.4  0.0 - 1.5 % Final   • Immature Grans % 09/09/2022 0.1  0.0 - 0.5 % Final   • Neutrophils, Absolute 09/09/2022 5.76  1.70 - 7.00 10*3/mm3 Final   • Lymphocytes, Absolute 09/09/2022 1.07  0.70 - 3.10 10*3/mm3 Final   • Monocytes, Absolute 09/09/2022 0.72  0.10 - 0.90 10*3/mm3 Final   • Eosinophils, Absolute 09/09/2022 0.15  0.00 - 0.40 10*3/mm3 Final   • Basophils, Absolute 09/09/2022 0.03  0.00 - 0.20 10*3/mm3 Final   • Immature Grans, Absolute 09/09/2022 0.01  0.00 - 0.05 10*3/mm3 Final      CT Chest With Contrast Diagnostic    Result Date: 11/23/2022  1. Stable postsurgical changes in the left nephrectomy bed. 2. No evidence of progression or recurrence in the abdomen or pelvis. 3. Findings are  concerning for atypical viral pneumonia or acute infectious/inflammatory process. This limits evaluation for metastatic disease in the chest. A short-term follow-up CT scan of the chest is recommended in one month after patient is treated. This finding was called to Dr. Pizano on 11/23/2022 at 3:04 PM.  This study was performed with techniques to keep radiation doses as low as reasonably achievable (ALARA). Individualized dose reduction techniques using automated exposure control or adjustment of mA and/or kV according to the patient size were employed.     Images were reviewed, interpreted, and dictated by VIVIANE Salinas Transcribed by Iman Bedolla PA-C.  This report was signed and finalized on 11/23/2022 4:17 PM by VIVIANE Flowers.    CT Abdomen Pelvis With Contrast    Result Date: 11/23/2022  1. Stable postsurgical changes in the left nephrectomy bed. 2. No evidence of progression or recurrence in the abdomen or pelvis. 3. Findings are concerning for atypical viral pneumonia or acute infectious/inflammatory process. This limits evaluation for metastatic disease in the chest. A short-term follow-up CT scan of the chest is recommended in one month after patient is treated. This finding was called to Dr. Pizano on 11/23/2022 at 3:04 PM.  This study was performed with techniques to keep radiation doses as low as reasonably achievable (ALARA). Individualized dose reduction techniques using automated exposure control or adjustment of mA and/or kV according to the patient size were employed.     Images were reviewed, interpreted, and dictated by VIVIANE Salinas Transcribed by Iman Bedolla PA-C.  This report was signed and finalized on 11/23/2022 4:17 PM by VIVIANE Flowers.    Colonoscopy on 11/4/2022  - Preparation of the colon was fair.  - One 6 mm polyp in the proximal descending colon, removed with a cold snare. Resected and retrieved.  - One 2 mm polyp at the recto-sigmoid  colon, removed with a cold snare. Resected and retrieved.  - Diverticulosis in the sigmoid colon and in the descending colon.  - Ileitis with ulcerations. This is likely chemo induced mucosal ulcerations. Biopsied.  - Four biopsies were obtained in the rectum, in the sigmoid colon, in the descending colon, in the transverse  colon, in the ascending colon and in the cecum.    Path; terminal ileum biopsy revealed ulcerated mucosa.  Cecum, ascending colon, transverse colon, descending colon, sigmoid, rectal biopsies were normal.  Descending colon polyp was tubular adenoma without any dysplasia.  Rectosigmoid polyp was hyperplastic    Assessment / Plan      1.  Terminal ileitis with ulcerations  2.  Adenomatous colon polyp  3.  History of renal cell carcinoma status post nephrectomy on immunotherapy  He had a colonoscopy done on 11/4/2022 which revealed terminal ileal inflammation with a large ulcerated mucosa.  Biopsies done revealed ulcerated mucosa no signs of any chronicity.  He had 2 polyps removed the descending colon polyp was tubular adenoma without any dysplasia rectosigmoid polyp was hyperplastic.  Colon biopsies were normal.    I suspect his terminal ileal ulceration likely associated with his immunotherapy.  Endoscopic findings were not suggestive of any IBD.  Recent lab work done in September October reviewed which reveals normal hemoglobin.  He does have a elevated creatinine this could be again associated with immunotherapy    Will continue to monitor for now.  As per patient he will be on immunotherapy at least for couple of years.  Recent CT abdomen done in October 2022 did not reveal any recurrence  We will follow him up in 1 year time.  He has been advised to call the office if there is any GI bleed or anemia.  Repeat colonoscopy in 3 years time due to suboptimal colon prep November 2025    4.  Gastroesophageal reflux disease without esophagitis  Occasional reflux symptoms.  Advised to OTC antacids  and reflux diet          Follow Up:   No follow-ups on file.    Rico Pizarro MD  [facility]  11/30/2022  16:16 EST     Please note that portions of this note may have been completed with a voice recognition program.

## 2022-12-02 ENCOUNTER — HOSPITAL ENCOUNTER (OUTPATIENT)
Dept: ONCOLOGY | Facility: HOSPITAL | Age: 57
Setting detail: INFUSION SERIES
Discharge: HOME | End: 2022-12-02
Payer: COMMERCIAL

## 2022-12-02 ENCOUNTER — OFFICE VISIT (OUTPATIENT)
Dept: ONCOLOGY | Facility: CLINIC | Age: 57
End: 2022-12-02

## 2022-12-02 VITALS
HEART RATE: 72 BPM | TEMPERATURE: 97.5 F | HEIGHT: 71 IN | DIASTOLIC BLOOD PRESSURE: 80 MMHG | WEIGHT: 215 LBS | OXYGEN SATURATION: 98 % | SYSTOLIC BLOOD PRESSURE: 137 MMHG | RESPIRATION RATE: 18 BRPM | BODY MASS INDEX: 30.1 KG/M2

## 2022-12-02 DIAGNOSIS — C64.2 CARCINOMA OF LEFT KIDNEY: Primary | ICD-10-CM

## 2022-12-02 LAB
ALBUMIN SERPL-MCNC: 4.1 G/DL (ref 3.5–5.2)
ALBUMIN/GLOB SERPL: 1.3 G/DL
ALP SERPL-CCNC: 91 U/L (ref 39–117)
ALT SERPL W P-5'-P-CCNC: 11 U/L (ref 1–41)
ANION GAP SERPL CALCULATED.3IONS-SCNC: 12 MMOL/L (ref 5–15)
AST SERPL-CCNC: 14 U/L (ref 1–40)
BASOPHILS # BLD AUTO: 0.02 10*3/MM3 (ref 0–0.2)
BASOPHILS NFR BLD AUTO: 0.3 % (ref 0–1.5)
BILIRUB SERPL-MCNC: 0.3 MG/DL (ref 0–1.2)
BUN SERPL-MCNC: 21 MG/DL (ref 6–20)
BUN/CREAT SERPL: 12.1 (ref 7–25)
CALCIUM SPEC-SCNC: 9.7 MG/DL (ref 8.6–10.5)
CHLORIDE SERPL-SCNC: 100 MMOL/L (ref 98–107)
CO2 SERPL-SCNC: 25 MMOL/L (ref 22–29)
CREAT SERPL-MCNC: 1.73 MG/DL (ref 0.76–1.27)
DEPRECATED RDW RBC AUTO: 43 FL (ref 37–54)
EGFRCR SERPLBLD CKD-EPI 2021: 45.5 ML/MIN/1.73
EOSINOPHIL # BLD AUTO: 0.09 10*3/MM3 (ref 0–0.4)
EOSINOPHIL NFR BLD AUTO: 1.3 % (ref 0.3–6.2)
ERYTHROCYTE [DISTWIDTH] IN BLOOD BY AUTOMATED COUNT: 13.4 % (ref 12.3–15.4)
GLOBULIN UR ELPH-MCNC: 3.1 GM/DL
GLUCOSE SERPL-MCNC: 121 MG/DL (ref 65–99)
HCT VFR BLD AUTO: 39.9 % (ref 37.5–51)
HGB BLD-MCNC: 13.6 G/DL (ref 13–17.7)
IMM GRANULOCYTES # BLD AUTO: 0.01 10*3/MM3 (ref 0–0.05)
IMM GRANULOCYTES NFR BLD AUTO: 0.1 % (ref 0–0.5)
LYMPHOCYTES # BLD AUTO: 1.24 10*3/MM3 (ref 0.7–3.1)
LYMPHOCYTES NFR BLD AUTO: 18.3 % (ref 19.6–45.3)
MCH RBC QN AUTO: 29.4 PG (ref 26.6–33)
MCHC RBC AUTO-ENTMCNC: 34.1 G/DL (ref 31.5–35.7)
MCV RBC AUTO: 86.2 FL (ref 79–97)
MONOCYTES # BLD AUTO: 0.68 10*3/MM3 (ref 0.1–0.9)
MONOCYTES NFR BLD AUTO: 10 % (ref 5–12)
NEUTROPHILS NFR BLD AUTO: 4.73 10*3/MM3 (ref 1.7–7)
NEUTROPHILS NFR BLD AUTO: 70 % (ref 42.7–76)
PLATELET # BLD AUTO: 254 10*3/MM3 (ref 140–450)
PMV BLD AUTO: 8.6 FL (ref 6–12)
POTASSIUM SERPL-SCNC: 4.8 MMOL/L (ref 3.5–5.2)
PROT SERPL-MCNC: 7.2 G/DL (ref 6–8.5)
RBC # BLD AUTO: 4.63 10*6/MM3 (ref 4.14–5.8)
SODIUM SERPL-SCNC: 137 MMOL/L (ref 136–145)
T4 FREE SERPL-MCNC: 1.36 NG/DL (ref 0.93–1.7)
TSH SERPL DL<=0.05 MIU/L-ACNC: 2.51 UIU/ML (ref 0.27–4.2)
WBC NRBC COR # BLD: 6.77 10*3/MM3 (ref 3.4–10.8)

## 2022-12-02 PROCEDURE — 80050 GENERAL HEALTH PANEL: CPT | Performed by: INTERNAL MEDICINE

## 2022-12-02 PROCEDURE — 84439 ASSAY OF FREE THYROXINE: CPT | Performed by: INTERNAL MEDICINE

## 2022-12-02 PROCEDURE — 25010000002 PEMBROLIZUMAB 100 MG/4ML SOLUTION 4 ML VIAL: Performed by: NURSE PRACTITIONER

## 2022-12-02 PROCEDURE — 99214 OFFICE O/P EST MOD 30 MIN: CPT | Performed by: NURSE PRACTITIONER

## 2022-12-02 PROCEDURE — 96413 CHEMO IV INFUSION 1 HR: CPT

## 2022-12-02 RX ORDER — SODIUM CHLORIDE 9 MG/ML
250 INJECTION, SOLUTION INTRAVENOUS ONCE
Status: CANCELLED | OUTPATIENT
Start: 2022-12-02

## 2022-12-02 RX ORDER — SODIUM CHLORIDE 9 MG/ML
250 INJECTION, SOLUTION INTRAVENOUS ONCE
Status: DISCONTINUED | OUTPATIENT
Start: 2022-12-02 | End: 2022-12-03 | Stop reason: HOSPADM

## 2022-12-02 RX ADMIN — SODIUM CHLORIDE 200 MG: 9 INJECTION, SOLUTION INTRAVENOUS at 10:07

## 2022-12-02 NOTE — PROGRESS NOTES
DATE OF VISIT: 12/2/2022    REASON FOR VISIT: Followup for left kidney cancer     PROBLEM LIST:  1. 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  Presented with hematuria and back pain  B.  CT abdomen pelvis done January 16, 2022 revealed 6 cm left kidney mass  C.  Status post radical nephrectomy done by Dr. Obrien February 16, 2022  D.  Final pathology confirmed 6 cm, grade 2 clear cell carcinoma, invades renal vein, clear surgical margins, and no lymphovascular invasion.  E. Started adjuvant immunotherapy 3/18/2022, status post 12 cycles of treatment. 2.  Hypertension  3.  Postoperative pain  4.  Right upper lobe lung nodule, 1 cm:  A.  Incidentally noted on CT chest done January 20, 2022  5.  Family history of kidney cancer  6. Immune-therapy induced pruritis  7. POT1 gene mutation    HISTORY OF PRESENT ILLNESS: The patient is a very pleasant 57 y.o. male  with past medical history significant for left kidney cancer diagnosed February 2022.  He is status post radical left nephrectomy.  He was started on adjuvant Keytruda March 18, 2022.  The patient is here today for scheduled follow-up visit with treatment cycle #13.    SUBJECTIVE: The patient is here today by himself. He has been doing fairly well. He is tolerating treatment well without side effects. He denies cough, shortness of breath, diarrhea, nausea, or vomiting. He saw Dr. Obrien earlier this week for follow up. He suggested we do next CT scans without contrast.     Past History:  Medical History: has a past medical history of Cancer (HCC), Hematuria, History of transfusion (02/16/2022), Hypertension, Tattoo, Tinnitus, and Wears glasses.   Surgical History: has a past surgical history that includes Vasectomy; Nephrectomy (Left, 2/16/2022); and Colonoscopy (N/A, 11/4/2022).   Family History: family history includes Cancer in his brother and mother; Heart disease in his father and paternal uncle; Leukemia in his maternal aunt; Skin cancer in his  "maternal grandfather.   Social History: reports that he quit smoking about 6 years ago. His smoking use included cigarettes. He has a 6.00 pack-year smoking history. He quit smokeless tobacco use about 8 months ago.  His smokeless tobacco use included chew. He reports current alcohol use. He reports that he does not use drugs.    (Not in a hospital admission)     Allergies: Patient has no known allergies.     Review of Systems   Constitutional: Positive for fatigue.   Musculoskeletal: Positive for arthralgias.       PHYSICAL EXAMINATION:   /80   Pulse 72   Temp 97.5 °F (36.4 °C) (Temporal)   Resp 18   Ht 180.3 cm (70.98\")   Wt 97.5 kg (215 lb)   SpO2 98%   BMI 30.00 kg/m²    Pain Score    12/02/22 0856   PainSc: 0-No pain     ECOG score: 0        ECOG Performance Status: 0 - Asymptomatic  General Appearance:  alert, cooperative, no apparent distress and appears stated age   Lungs:   Clear to auscultation bilaterally; respirations regular, even, and unlabored bilaterally   Heart:  Regular rate and rhythm, no murmurs appreciated   Abdomen:   Soft, non-tender, non-distended and no organomegaly     Hospital Outpatient Visit on 12/02/2022   Component Date Value Ref Range Status   • Glucose 12/02/2022 121 (H)  65 - 99 mg/dL Final   • BUN 12/02/2022 21 (H)  6 - 20 mg/dL Final   • Creatinine 12/02/2022 1.73 (H)  0.76 - 1.27 mg/dL Final   • Sodium 12/02/2022 137  136 - 145 mmol/L Final   • Potassium 12/02/2022 4.8  3.5 - 5.2 mmol/L Final   • Chloride 12/02/2022 100  98 - 107 mmol/L Final   • CO2 12/02/2022 25.0  22.0 - 29.0 mmol/L Final   • Calcium 12/02/2022 9.7  8.6 - 10.5 mg/dL Final   • Total Protein 12/02/2022 7.2  6.0 - 8.5 g/dL Final   • Albumin 12/02/2022 4.10  3.50 - 5.20 g/dL Final   • ALT (SGPT) 12/02/2022 11  1 - 41 U/L Final   • AST (SGOT) 12/02/2022 14  1 - 40 U/L Final   • Alkaline Phosphatase 12/02/2022 91  39 - 117 U/L Final   • Total Bilirubin 12/02/2022 0.3  0.0 - 1.2 mg/dL Final   • Globulin " 12/02/2022 3.1  gm/dL Final    Calculated Result   • A/G Ratio 12/02/2022 1.3  g/dL Final   • BUN/Creatinine Ratio 12/02/2022 12.1  7.0 - 25.0 Final   • Anion Gap 12/02/2022 12.0  5.0 - 15.0 mmol/L Final   • eGFR 12/02/2022 45.5 (L)  >60.0 mL/min/1.73 Final    National Kidney Foundation and American Society of Nephrology (ASN) Task Force recommended calculation based on the Chronic Kidney Disease Epidemiology Collaboration (CKD-EPI) equation refit without adjustment for race.   • TSH 12/02/2022 2.510  0.270 - 4.200 uIU/mL Final   • Free T4 12/02/2022 1.36  0.93 - 1.70 ng/dL Final   • WBC 12/02/2022 6.77  3.40 - 10.80 10*3/mm3 Final   • RBC 12/02/2022 4.63  4.14 - 5.80 10*6/mm3 Final   • Hemoglobin 12/02/2022 13.6  13.0 - 17.7 g/dL Final   • Hematocrit 12/02/2022 39.9  37.5 - 51.0 % Final   • MCV 12/02/2022 86.2  79.0 - 97.0 fL Final   • MCH 12/02/2022 29.4  26.6 - 33.0 pg Final   • MCHC 12/02/2022 34.1  31.5 - 35.7 g/dL Final   • RDW 12/02/2022 13.4  12.3 - 15.4 % Final   • RDW-SD 12/02/2022 43.0  37.0 - 54.0 fl Final   • MPV 12/02/2022 8.6  6.0 - 12.0 fL Final   • Platelets 12/02/2022 254  140 - 450 10*3/mm3 Final   • Neutrophil % 12/02/2022 70.0  42.7 - 76.0 % Final   • Lymphocyte % 12/02/2022 18.3 (L)  19.6 - 45.3 % Final   • Monocyte % 12/02/2022 10.0  5.0 - 12.0 % Final   • Eosinophil % 12/02/2022 1.3  0.3 - 6.2 % Final   • Basophil % 12/02/2022 0.3  0.0 - 1.5 % Final   • Immature Grans % 12/02/2022 0.1  0.0 - 0.5 % Final   • Neutrophils, Absolute 12/02/2022 4.73  1.70 - 7.00 10*3/mm3 Final   • Lymphocytes, Absolute 12/02/2022 1.24  0.70 - 3.10 10*3/mm3 Final   • Monocytes, Absolute 12/02/2022 0.68  0.10 - 0.90 10*3/mm3 Final   • Eosinophils, Absolute 12/02/2022 0.09  0.00 - 0.40 10*3/mm3 Final   • Basophils, Absolute 12/02/2022 0.02  0.00 - 0.20 10*3/mm3 Final   • Immature Grans, Absolute 12/02/2022 0.01  0.00 - 0.05 10*3/mm3 Final        CT Chest With Contrast Diagnostic, CT Abdomen Pelvis With  Contrast    Result Date: 11/23/2022  Narrative: PROCEDURE: CT ABDOMEN PELVIS W CONTRAST-, CT CHEST W CONTRAST DIAGNOSTIC-  HISTORY: restaging renal cell carcinoma; C64.2-Malignant neoplasm of left kidney, except renal pelvis  COMPARISON: May 2022.  PROCEDURE: Axial images were obtained from the thoracic inlet through the pubic symphysis following the administration of Isovue 300 contrast.   FINDINGS:  CHEST: There are mildly enlarged bilateral hilar lymph nodes measure to 2 cm. No aggressive osseous lesion is seen. Heart size is normal. There are no pleural or pericardial effusions. There are bilateral multifocal patchy groundglass opacities. There are scattered subcentimeter pulmonary nodules. Bone windows reveal no lytic or destructive lesions.  ABDOMEN: There are stable hypodense lesions in the left hemiliver. The spleen is unremarkable. No adrenal mass is present.  The pancreas is normal. The kidneys are unremarkable. The aorta is normal in caliber. There is no free fluid or adenopathy. There are stable postsurgical changes in the left nephrectomy bed. There is mild diverticulosis without evidence of acute diverticulitis.  PELVIS: The appendix is normal. The prostate is mildly enlarged. There is no aggressive osseous sclerotic or lucent lesions. There is no significant free fluid or adenopathy. Bone windows reveal no lytic or destructive lesions.      Impression: 1. Stable postsurgical changes in the left nephrectomy bed. 2. No evidence of progression or recurrence in the abdomen or pelvis. 3. Findings are concerning for atypical viral pneumonia or acute infectious/inflammatory process. This limits evaluation for metastatic disease in the chest. A short-term follow-up CT scan of the chest is recommended in one month after patient is treated. This finding was called to Dr. Pizano on 11/23/2022 at 3:04 PM.  This study was performed with techniques to keep radiation doses as low as reasonably achievable (ALARA).  Individualized dose reduction techniques using automated exposure control or adjustment of mA and/or kV according to the patient size were employed.     Images were reviewed, interpreted, and dictated by VIVIANE Salinas Transcribed by Iman Bedolla PA-C.  This report was signed and finalized on 11/23/2022 4:17 PM by VIVIANE Flowers.      ASSESSMENT: The patient is a very pleasant 57 y.o. male  with left kidney cancer      PLAN:    1.  Left kidney cancer:  A.  I will proceed with adjuvant Keytruda 20 mg IV every 3 weeks cycle # 13 today.  B.  The patient will follow up with us in 3 weeks for cycle # 14.   C.  We will plan to finish 2 years of treatment if tolerated.    D.  I will continue to monitor the patient's blood work including blood counts, kidney function, liver functions, and electrolytes. We will also continue to monitor thyroid studies periodically.   E.  I reviewed the lab results from today with the patient. I reassured him that his blood counts are within normal limits. His creatinine has continued to improve to 1.73. His liver enzymes normal as well as thyroid functions.   F. We reviewed again the potential side effects of immunotherapy including but not limited to immune mediated reactions with thyroiditis, pneumonitis, hepatitis, colitis, rash, and electrolyte abnormalities, fatigue, multiorgan failure, and possibly death.  G.  I reviewed the CAT scan results with the patient. I told him he has no evidence of new or progressive disease. He has evidence of inflammatory changes in the lungs that may be consistent with atypical pneumonia versus pneumonitis. We will do follow up imaging to monitor for progressive inflammation. We will plan to repeat CAT scans in 3-6 months without contrast secondary to elevated creatinine.     2.  Right upper lobe lung nodule:  A. The scattered bilateral pulmonary nodules are stable in size. We will continue to monitor these are follow up imaging.   B.  He will follow up with Dr. Neff from pulmonary.     3.  Treatment induced nausea:  A.  He will continue use of Zofran as needed for treatment induced nausea.    4.  Hypertension:  A.  I will continue Norvasc 5 mg daily.  B.  We will monitor the patient blood pressure while on treatment.  He would be at risk for hypotension as well as hypertension.    5. Immune-therapy induced pruritis:  A. I encouraged him to keep his skin well moisturized. He will monitor for rash or worsening symptoms.       6. POT1 Gene mutation:  A. I recommended he have annual skin exam due to higher risk for melanoma.   B. We will continue surveillance CT scans for renal cell carcinoma.   C. We will consider MRI brain in the future if he has symptoms.     7.  Elevated creatinine:  A.  I did go over his creatinine result from today is improved some to 1.73.    B.  The patient was advised to continue to try to increase fluid intake.  C.  He will continue to avoid nephrotoxic agents.  D.  If his creatinine increases we may have to hold treatment and administer steroids for possibility of autoimmune nephritis.  E.  We will do his next scans without contrast to lessen the nephrotoxic effect from contrast dye.     8. Possible pneumonitis:  A. He will continue to monitor for symptoms of pneumonitis including cough, shortness of breath, or chest discomfort. We may have to hold treatment and start him on steroids if he has progressive symptoms or CT changes that indicate worsening pneumonitis. We discussed this is a side effect from Keytruda.     FOLLOW UP: 3 weeks with next cycle of treatment.    Yolanad Shaffer, APRN  12/2/2022

## 2022-12-20 DIAGNOSIS — C64.2 CARCINOMA OF LEFT KIDNEY: Primary | ICD-10-CM

## 2022-12-23 ENCOUNTER — HOSPITAL ENCOUNTER (OUTPATIENT)
Dept: ONCOLOGY | Facility: HOSPITAL | Age: 57
Setting detail: INFUSION SERIES
End: 2022-12-23
Payer: COMMERCIAL

## 2022-12-23 ENCOUNTER — HOSPITAL ENCOUNTER (OUTPATIENT)
Dept: ONCOLOGY | Facility: HOSPITAL | Age: 57
Setting detail: INFUSION SERIES
Discharge: HOME | End: 2022-12-23
Payer: COMMERCIAL

## 2022-12-23 ENCOUNTER — TELEMEDICINE (OUTPATIENT)
Dept: ONCOLOGY | Facility: CLINIC | Age: 57
End: 2022-12-23

## 2022-12-23 DIAGNOSIS — C64.2 CARCINOMA OF LEFT KIDNEY: Primary | ICD-10-CM

## 2022-12-23 PROCEDURE — 99422 OL DIG E/M SVC 11-20 MIN: CPT | Performed by: INTERNAL MEDICINE

## 2022-12-23 NOTE — PROGRESS NOTES
This visit has been rescheduled as a phone visit to comply with patient safety concerns in accordance with CDC recommendations. Total time of discussion was 18 minutes.    You have chosen to receive care through a telephone visit. Do you consent to use a telephone visit for your medical care today? Yes    DATE OF VISIT: 12/23/2022    REASON FOR VISIT: Followup for left kidney cancer     PROBLEM LIST:  1. 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  Presented with hematuria and back pain  B.  CT abdomen pelvis done January 16, 2022 revealed 6 cm left kidney mass  C.  Status post radical nephrectomy done by Dr. Obrien February 16, 2022  D.  Final pathology confirmed 6 cm, grade 2 clear cell carcinoma, invades renal vein, clear surgical margins, and no lymphovascular invasion.  E. Started adjuvant immunotherapy 3/18/2022, status post 12 cycles of treatment. 2.  Hypertension  3.  Postoperative pain  4.  Right upper lobe lung nodule, 1 cm:  A.  Incidentally noted on CT chest done January 20, 2022  5.  Family history of kidney cancer  6. Immune-therapy induced pruritis  7. POT1 gene mutation    HISTORY OF PRESENT ILLNESS: The patient is a very pleasant 57 y.o. male  with past medical history significant for left kidney cancer diagnosed February 2022.  He is status post radical left nephrectomy.  He was started on adjuvant Keytruda March 18, 2022.  The patient is here today for scheduled follow-up visit with treatment cycle #13.    SUBJECTIVE: The patient was interviewed using video telemedicine given the current pandemic.  He is doing fairly well.  Complaining of fatigue.  Has been having some joint tenderness.    Past History:  Medical History: has a past medical history of Cancer (HCC), Hematuria, History of transfusion (02/16/2022), Hypertension, Tattoo, Tinnitus, and Wears glasses.   Surgical History: has a past surgical history that includes Vasectomy; Nephrectomy (Left, 2/16/2022); and Colonoscopy (N/A,  11/4/2022).   Family History: family history includes Cancer in his brother and mother; Heart disease in his father and paternal uncle; Leukemia in his maternal aunt; Skin cancer in his maternal grandfather.   Social History: reports that he quit smoking about 6 years ago. His smoking use included cigarettes. He has a 6.00 pack-year smoking history. He quit smokeless tobacco use about 9 months ago.  His smokeless tobacco use included chew. He reports current alcohol use. He reports that he does not use drugs.    (Not in a hospital admission)     Allergies: Patient has no known allergies.     Review of Systems   Constitutional: Positive for fatigue.   Musculoskeletal: Positive for arthralgias.       PHYSICAL EXAMINATION:   There were no vitals taken for this visit.   There were no vitals filed for this visit.           ECOG Performance Status: 1 - Symptomatic but completely ambulatory  General Appearance:  alert, cooperative, no apparent distress and appears stated age   Lungs:      Heart:     Abdomen:        No visits with results within 2 Week(s) from this visit.   Latest known visit with results is:   Hospital Outpatient Visit on 12/02/2022   Component Date Value Ref Range Status   • Glucose 12/02/2022 121 (H)  65 - 99 mg/dL Final   • BUN 12/02/2022 21 (H)  6 - 20 mg/dL Final   • Creatinine 12/02/2022 1.73 (H)  0.76 - 1.27 mg/dL Final   • Sodium 12/02/2022 137  136 - 145 mmol/L Final   • Potassium 12/02/2022 4.8  3.5 - 5.2 mmol/L Final   • Chloride 12/02/2022 100  98 - 107 mmol/L Final   • CO2 12/02/2022 25.0  22.0 - 29.0 mmol/L Final   • Calcium 12/02/2022 9.7  8.6 - 10.5 mg/dL Final   • Total Protein 12/02/2022 7.2  6.0 - 8.5 g/dL Final   • Albumin 12/02/2022 4.10  3.50 - 5.20 g/dL Final   • ALT (SGPT) 12/02/2022 11  1 - 41 U/L Final   • AST (SGOT) 12/02/2022 14  1 - 40 U/L Final   • Alkaline Phosphatase 12/02/2022 91  39 - 117 U/L Final   • Total Bilirubin 12/02/2022 0.3  0.0 - 1.2 mg/dL Final   • Globulin  12/02/2022 3.1  gm/dL Final    Calculated Result   • A/G Ratio 12/02/2022 1.3  g/dL Final   • BUN/Creatinine Ratio 12/02/2022 12.1  7.0 - 25.0 Final   • Anion Gap 12/02/2022 12.0  5.0 - 15.0 mmol/L Final   • eGFR 12/02/2022 45.5 (L)  >60.0 mL/min/1.73 Final    National Kidney Foundation and American Society of Nephrology (ASN) Task Force recommended calculation based on the Chronic Kidney Disease Epidemiology Collaboration (CKD-EPI) equation refit without adjustment for race.   • TSH 12/02/2022 2.510  0.270 - 4.200 uIU/mL Final   • Free T4 12/02/2022 1.36  0.93 - 1.70 ng/dL Final   • WBC 12/02/2022 6.77  3.40 - 10.80 10*3/mm3 Final   • RBC 12/02/2022 4.63  4.14 - 5.80 10*6/mm3 Final   • Hemoglobin 12/02/2022 13.6  13.0 - 17.7 g/dL Final   • Hematocrit 12/02/2022 39.9  37.5 - 51.0 % Final   • MCV 12/02/2022 86.2  79.0 - 97.0 fL Final   • MCH 12/02/2022 29.4  26.6 - 33.0 pg Final   • MCHC 12/02/2022 34.1  31.5 - 35.7 g/dL Final   • RDW 12/02/2022 13.4  12.3 - 15.4 % Final   • RDW-SD 12/02/2022 43.0  37.0 - 54.0 fl Final   • MPV 12/02/2022 8.6  6.0 - 12.0 fL Final   • Platelets 12/02/2022 254  140 - 450 10*3/mm3 Final   • Neutrophil % 12/02/2022 70.0  42.7 - 76.0 % Final   • Lymphocyte % 12/02/2022 18.3 (L)  19.6 - 45.3 % Final   • Monocyte % 12/02/2022 10.0  5.0 - 12.0 % Final   • Eosinophil % 12/02/2022 1.3  0.3 - 6.2 % Final   • Basophil % 12/02/2022 0.3  0.0 - 1.5 % Final   • Immature Grans % 12/02/2022 0.1  0.0 - 0.5 % Final   • Neutrophils, Absolute 12/02/2022 4.73  1.70 - 7.00 10*3/mm3 Final   • Lymphocytes, Absolute 12/02/2022 1.24  0.70 - 3.10 10*3/mm3 Final   • Monocytes, Absolute 12/02/2022 0.68  0.10 - 0.90 10*3/mm3 Final   • Eosinophils, Absolute 12/02/2022 0.09  0.00 - 0.40 10*3/mm3 Final   • Basophils, Absolute 12/02/2022 0.02  0.00 - 0.20 10*3/mm3 Final   • Immature Grans, Absolute 12/02/2022 0.01  0.00 - 0.05 10*3/mm3 Final        CT Chest With Contrast Diagnostic, CT Abdomen Pelvis With  Contrast    Result Date: 11/23/2022  Narrative: PROCEDURE: CT ABDOMEN PELVIS W CONTRAST-, CT CHEST W CONTRAST DIAGNOSTIC-  HISTORY: restaging renal cell carcinoma; C64.2-Malignant neoplasm of left kidney, except renal pelvis  COMPARISON: May 2022.  PROCEDURE: Axial images were obtained from the thoracic inlet through the pubic symphysis following the administration of Isovue 300 contrast.   FINDINGS:  CHEST: There are mildly enlarged bilateral hilar lymph nodes measure to 2 cm. No aggressive osseous lesion is seen. Heart size is normal. There are no pleural or pericardial effusions. There are bilateral multifocal patchy groundglass opacities. There are scattered subcentimeter pulmonary nodules. Bone windows reveal no lytic or destructive lesions.  ABDOMEN: There are stable hypodense lesions in the left hemiliver. The spleen is unremarkable. No adrenal mass is present.  The pancreas is normal. The kidneys are unremarkable. The aorta is normal in caliber. There is no free fluid or adenopathy. There are stable postsurgical changes in the left nephrectomy bed. There is mild diverticulosis without evidence of acute diverticulitis.  PELVIS: The appendix is normal. The prostate is mildly enlarged. There is no aggressive osseous sclerotic or lucent lesions. There is no significant free fluid or adenopathy. Bone windows reveal no lytic or destructive lesions.      Impression: 1. Stable postsurgical changes in the left nephrectomy bed. 2. No evidence of progression or recurrence in the abdomen or pelvis. 3. Findings are concerning for atypical viral pneumonia or acute infectious/inflammatory process. This limits evaluation for metastatic disease in the chest. A short-term follow-up CT scan of the chest is recommended in one month after patient is treated. This finding was called to Dr. Pizano on 11/23/2022 at 3:04 PM.  This study was performed with techniques to keep radiation doses as low as reasonably achievable (ALARA).  Individualized dose reduction techniques using automated exposure control or adjustment of mA and/or kV according to the patient size were employed.     Images were reviewed, interpreted, and dictated by VIVIANE Salinas Transcribed by Iman Bedolla PA-C.  This report was signed and finalized on 11/23/2022 4:17 PM by VIVIANE Flowers.      ASSESSMENT: The patient is a very pleasant 57 y.o. male  with left kidney cancer      PLAN:    1.  Left kidney cancer:  A.  I will proceed with adjuvant Keytruda 20 mg IV every 3 weeks cycle # 14 next week.  B.  The patient will follow up with us in 4 weeks for cycle # 15.   C.  We will plan to finish 2 years of treatment if tolerated.    D.  I will continue to monitor the patient's blood work including blood counts, kidney function, liver functions, and electrolytes. We will also continue to monitor thyroid studies periodically.   E.  I reviewed the lab results from today with the patient. I reassured him that his blood counts are within normal limits. His creatinine has continued to improve to 1.73. His liver enzymes normal as well as thyroid functions.   F. We reviewed again the potential side effects of immunotherapy including but not limited to immune mediated reactions with thyroiditis, pneumonitis, hepatitis, colitis, rash, and electrolyte abnormalities, fatigue, multiorgan failure, and possibly death.  G.  I reviewed the CAT scan results with the patient. I told him he has no evidence of new or progressive disease. He has evidence of inflammatory changes in the lungs that may be consistent with atypical pneumonia versus pneumonitis. We will do follow up imaging to monitor for progressive inflammation. We will plan to repeat CAT scans in 3-6 months without contrast secondary to elevated creatinine.     2.  Right upper lobe lung nodule:  A. The scattered bilateral pulmonary nodules are stable in size. We will continue to monitor these are follow up imaging.    B. He will follow up with Dr. Neff from pulmonary.     3.  Treatment induced nausea:  A.  He will continue use of Zofran as needed for treatment induced nausea.    4.  Hypertension:  A.  I will continue Norvasc 5 mg daily.  B.  We will monitor the patient blood pressure while on treatment.  He would be at risk for hypotension as well as hypertension.    5. Immune-therapy induced pruritis:  A. I encouraged him to keep his skin well moisturized. He will monitor for rash or worsening symptoms.       6. POT1 Gene mutation:  A. I recommended he have annual skin exam due to higher risk for melanoma.   B. We will continue surveillance CT scans for renal cell carcinoma.   C. We will consider MRI brain in the future if he has symptoms.     7.  Elevated creatinine:  A.  I did go over his creatinine result from today is improved some to 1.73.    B.  The patient was advised to continue to try to increase fluid intake.  C.  He will continue to avoid nephrotoxic agents.  D.  If his creatinine increases we may have to hold treatment and administer steroids for possibility of autoimmune nephritis.  E.  We will do his next scans without contrast to lessen the nephrotoxic effect from contrast dye.     8. Possible pneumonitis:  A. He will continue to monitor for symptoms of pneumonitis including cough, shortness of breath, or chest discomfort. We may have to hold treatment and start him on steroids if he has progressive symptoms or CT changes that indicate worsening pneumonitis. We discussed this is a side effect from Keytruda.     FOLLOW UP: 4 weeks with next cycle of treatment.    Garrett Pizano MD  12/23/2022

## 2022-12-30 ENCOUNTER — HOSPITAL ENCOUNTER (OUTPATIENT)
Dept: ONCOLOGY | Facility: HOSPITAL | Age: 57
Setting detail: INFUSION SERIES
Discharge: HOME | End: 2022-12-30
Payer: COMMERCIAL

## 2022-12-30 ENCOUNTER — APPOINTMENT (OUTPATIENT)
Dept: ONCOLOGY | Facility: HOSPITAL | Age: 57
End: 2022-12-30
Payer: COMMERCIAL

## 2022-12-30 VITALS
HEART RATE: 81 BPM | RESPIRATION RATE: 16 BRPM | DIASTOLIC BLOOD PRESSURE: 103 MMHG | HEIGHT: 71 IN | SYSTOLIC BLOOD PRESSURE: 154 MMHG | TEMPERATURE: 97.6 F | BODY MASS INDEX: 30.38 KG/M2 | WEIGHT: 217 LBS

## 2022-12-30 DIAGNOSIS — C64.2 CARCINOMA OF LEFT KIDNEY: Primary | ICD-10-CM

## 2022-12-30 LAB
ALBUMIN SERPL-MCNC: 4.3 G/DL (ref 3.5–5.2)
ALBUMIN/GLOB SERPL: 1.5 G/DL
ALP SERPL-CCNC: 82 U/L (ref 39–117)
ALT SERPL W P-5'-P-CCNC: 17 U/L (ref 1–41)
ANION GAP SERPL CALCULATED.3IONS-SCNC: 11 MMOL/L (ref 5–15)
AST SERPL-CCNC: 18 U/L (ref 1–40)
BASOPHILS # BLD AUTO: 0.02 10*3/MM3 (ref 0–0.2)
BASOPHILS NFR BLD AUTO: 0.3 % (ref 0–1.5)
BILIRUB SERPL-MCNC: 0.3 MG/DL (ref 0–1.2)
BUN SERPL-MCNC: 21 MG/DL (ref 6–20)
BUN/CREAT SERPL: 12.7 (ref 7–25)
CALCIUM SPEC-SCNC: 9.3 MG/DL (ref 8.6–10.5)
CHLORIDE SERPL-SCNC: 102 MMOL/L (ref 98–107)
CO2 SERPL-SCNC: 25 MMOL/L (ref 22–29)
CREAT SERPL-MCNC: 1.66 MG/DL (ref 0.76–1.27)
DEPRECATED RDW RBC AUTO: 42.6 FL (ref 37–54)
EGFRCR SERPLBLD CKD-EPI 2021: 47.8 ML/MIN/1.73
EOSINOPHIL # BLD AUTO: 0.14 10*3/MM3 (ref 0–0.4)
EOSINOPHIL NFR BLD AUTO: 2.1 % (ref 0.3–6.2)
ERYTHROCYTE [DISTWIDTH] IN BLOOD BY AUTOMATED COUNT: 13.1 % (ref 12.3–15.4)
GLOBULIN UR ELPH-MCNC: 2.8 GM/DL
GLUCOSE SERPL-MCNC: 121 MG/DL (ref 65–99)
HCT VFR BLD AUTO: 40.5 % (ref 37.5–51)
HGB BLD-MCNC: 13.7 G/DL (ref 13–17.7)
IMM GRANULOCYTES # BLD AUTO: 0.01 10*3/MM3 (ref 0–0.05)
IMM GRANULOCYTES NFR BLD AUTO: 0.2 % (ref 0–0.5)
LYMPHOCYTES # BLD AUTO: 1.51 10*3/MM3 (ref 0.7–3.1)
LYMPHOCYTES NFR BLD AUTO: 23.2 % (ref 19.6–45.3)
MCH RBC QN AUTO: 29.5 PG (ref 26.6–33)
MCHC RBC AUTO-ENTMCNC: 33.8 G/DL (ref 31.5–35.7)
MCV RBC AUTO: 87.1 FL (ref 79–97)
MONOCYTES # BLD AUTO: 0.51 10*3/MM3 (ref 0.1–0.9)
MONOCYTES NFR BLD AUTO: 7.8 % (ref 5–12)
NEUTROPHILS NFR BLD AUTO: 4.33 10*3/MM3 (ref 1.7–7)
NEUTROPHILS NFR BLD AUTO: 66.4 % (ref 42.7–76)
PLATELET # BLD AUTO: 280 10*3/MM3 (ref 140–450)
PMV BLD AUTO: 8.6 FL (ref 6–12)
POTASSIUM SERPL-SCNC: 4.5 MMOL/L (ref 3.5–5.2)
PROT SERPL-MCNC: 7.1 G/DL (ref 6–8.5)
RBC # BLD AUTO: 4.65 10*6/MM3 (ref 4.14–5.8)
SODIUM SERPL-SCNC: 138 MMOL/L (ref 136–145)
WBC NRBC COR # BLD: 6.52 10*3/MM3 (ref 3.4–10.8)

## 2022-12-30 PROCEDURE — 85025 COMPLETE CBC W/AUTO DIFF WBC: CPT | Performed by: NURSE PRACTITIONER

## 2022-12-30 PROCEDURE — 80053 COMPREHEN METABOLIC PANEL: CPT | Performed by: NURSE PRACTITIONER

## 2022-12-30 PROCEDURE — 96413 CHEMO IV INFUSION 1 HR: CPT

## 2022-12-30 PROCEDURE — 25010000002 PEMBROLIZUMAB 100 MG/4ML SOLUTION 4 ML VIAL: Performed by: INTERNAL MEDICINE

## 2022-12-30 RX ORDER — SODIUM CHLORIDE 9 MG/ML
250 INJECTION, SOLUTION INTRAVENOUS ONCE
Status: CANCELLED | OUTPATIENT
Start: 2022-12-30

## 2022-12-30 RX ORDER — SODIUM CHLORIDE 9 MG/ML
250 INJECTION, SOLUTION INTRAVENOUS ONCE
Status: CANCELLED | OUTPATIENT
Start: 2023-01-20

## 2022-12-30 RX ORDER — SODIUM CHLORIDE 9 MG/ML
250 INJECTION, SOLUTION INTRAVENOUS ONCE
Status: DISCONTINUED | OUTPATIENT
Start: 2022-12-30 | End: 2022-12-31 | Stop reason: HOSPADM

## 2022-12-30 RX ADMIN — SODIUM CHLORIDE 200 MG: 9 INJECTION, SOLUTION INTRAVENOUS at 11:20

## 2023-01-20 ENCOUNTER — HOSPITAL ENCOUNTER (OUTPATIENT)
Dept: ONCOLOGY | Facility: HOSPITAL | Age: 58
Setting detail: INFUSION SERIES
Discharge: HOME | End: 2023-01-20
Payer: COMMERCIAL

## 2023-01-20 ENCOUNTER — APPOINTMENT (OUTPATIENT)
Dept: ONCOLOGY | Facility: HOSPITAL | Age: 58
End: 2023-01-20
Payer: COMMERCIAL

## 2023-01-20 ENCOUNTER — OFFICE VISIT (OUTPATIENT)
Dept: ONCOLOGY | Facility: CLINIC | Age: 58
End: 2023-01-20
Payer: COMMERCIAL

## 2023-01-20 VITALS
TEMPERATURE: 97.3 F | HEART RATE: 73 BPM | OXYGEN SATURATION: 99 % | SYSTOLIC BLOOD PRESSURE: 173 MMHG | HEIGHT: 71 IN | DIASTOLIC BLOOD PRESSURE: 90 MMHG | BODY MASS INDEX: 30.66 KG/M2 | WEIGHT: 219 LBS | RESPIRATION RATE: 18 BRPM

## 2023-01-20 DIAGNOSIS — C64.2 CARCINOMA OF LEFT KIDNEY: Primary | ICD-10-CM

## 2023-01-20 LAB
ALBUMIN SERPL-MCNC: 4.4 G/DL (ref 3.5–5.2)
ALBUMIN/GLOB SERPL: 1.8 G/DL
ALP SERPL-CCNC: 84 U/L (ref 39–117)
ALT SERPL W P-5'-P-CCNC: 11 U/L (ref 1–41)
ANION GAP SERPL CALCULATED.3IONS-SCNC: 11 MMOL/L (ref 5–15)
AST SERPL-CCNC: 13 U/L (ref 1–40)
BASOPHILS # BLD AUTO: 0.01 10*3/MM3 (ref 0–0.2)
BASOPHILS NFR BLD AUTO: 0.2 % (ref 0–1.5)
BILIRUB SERPL-MCNC: 0.5 MG/DL (ref 0–1.2)
BUN SERPL-MCNC: 23 MG/DL (ref 6–20)
BUN/CREAT SERPL: 14.8 (ref 7–25)
CALCIUM SPEC-SCNC: 9.4 MG/DL (ref 8.6–10.5)
CHLORIDE SERPL-SCNC: 99 MMOL/L (ref 98–107)
CO2 SERPL-SCNC: 25 MMOL/L (ref 22–29)
CREAT SERPL-MCNC: 1.55 MG/DL (ref 0.76–1.27)
DEPRECATED RDW RBC AUTO: 41.6 FL (ref 37–54)
EGFRCR SERPLBLD CKD-EPI 2021: 51.9 ML/MIN/1.73
EOSINOPHIL # BLD AUTO: 0.07 10*3/MM3 (ref 0–0.4)
EOSINOPHIL NFR BLD AUTO: 1.3 % (ref 0.3–6.2)
ERYTHROCYTE [DISTWIDTH] IN BLOOD BY AUTOMATED COUNT: 13.4 % (ref 12.3–15.4)
GLOBULIN UR ELPH-MCNC: 2.5 GM/DL
GLUCOSE SERPL-MCNC: 109 MG/DL (ref 65–99)
HCT VFR BLD AUTO: 40.8 % (ref 37.5–51)
HGB BLD-MCNC: 13.8 G/DL (ref 13–17.7)
IMM GRANULOCYTES # BLD AUTO: 0.01 10*3/MM3 (ref 0–0.05)
IMM GRANULOCYTES NFR BLD AUTO: 0.2 % (ref 0–0.5)
LYMPHOCYTES # BLD AUTO: 1.24 10*3/MM3 (ref 0.7–3.1)
LYMPHOCYTES NFR BLD AUTO: 22.7 % (ref 19.6–45.3)
MCH RBC QN AUTO: 28.9 PG (ref 26.6–33)
MCHC RBC AUTO-ENTMCNC: 33.8 G/DL (ref 31.5–35.7)
MCV RBC AUTO: 85.4 FL (ref 79–97)
MONOCYTES # BLD AUTO: 0.53 10*3/MM3 (ref 0.1–0.9)
MONOCYTES NFR BLD AUTO: 9.7 % (ref 5–12)
NEUTROPHILS NFR BLD AUTO: 3.6 10*3/MM3 (ref 1.7–7)
NEUTROPHILS NFR BLD AUTO: 65.9 % (ref 42.7–76)
PLATELET # BLD AUTO: 248 10*3/MM3 (ref 140–450)
PMV BLD AUTO: 8.9 FL (ref 6–12)
POTASSIUM SERPL-SCNC: 4.4 MMOL/L (ref 3.5–5.2)
PROT SERPL-MCNC: 6.9 G/DL (ref 6–8.5)
RBC # BLD AUTO: 4.78 10*6/MM3 (ref 4.14–5.8)
SODIUM SERPL-SCNC: 135 MMOL/L (ref 136–145)
T4 FREE SERPL-MCNC: 1.44 NG/DL (ref 0.93–1.7)
TSH SERPL DL<=0.05 MIU/L-ACNC: 0.89 UIU/ML (ref 0.27–4.2)
WBC NRBC COR # BLD: 5.46 10*3/MM3 (ref 3.4–10.8)

## 2023-01-20 PROCEDURE — 96413 CHEMO IV INFUSION 1 HR: CPT

## 2023-01-20 PROCEDURE — 80050 GENERAL HEALTH PANEL: CPT | Performed by: INTERNAL MEDICINE

## 2023-01-20 PROCEDURE — 84439 ASSAY OF FREE THYROXINE: CPT | Performed by: INTERNAL MEDICINE

## 2023-01-20 PROCEDURE — 25010000002 PEMBROLIZUMAB 100 MG/4ML SOLUTION 4 ML VIAL: Performed by: INTERNAL MEDICINE

## 2023-01-20 PROCEDURE — 99214 OFFICE O/P EST MOD 30 MIN: CPT | Performed by: INTERNAL MEDICINE

## 2023-01-20 RX ORDER — SODIUM CHLORIDE 9 MG/ML
250 INJECTION, SOLUTION INTRAVENOUS ONCE
Status: CANCELLED | OUTPATIENT
Start: 2023-02-10

## 2023-01-20 RX ORDER — SODIUM CHLORIDE 9 MG/ML
250 INJECTION, SOLUTION INTRAVENOUS ONCE
Status: COMPLETED | OUTPATIENT
Start: 2023-01-20 | End: 2023-01-20

## 2023-01-20 RX ADMIN — SODIUM CHLORIDE 250 ML: 9 INJECTION, SOLUTION INTRAVENOUS at 13:55

## 2023-01-20 RX ADMIN — SODIUM CHLORIDE 200 MG: 9 INJECTION, SOLUTION INTRAVENOUS at 13:56

## 2023-01-20 NOTE — PROGRESS NOTES
DATE OF VISIT: 1/20/2023    REASON FOR VISIT: Followup for left kidney cancer     PROBLEM LIST:  1. 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  Presented with hematuria and back pain  B.  CT abdomen pelvis done January 16, 2022 revealed 6 cm left kidney mass  C.  Status post radical nephrectomy done by Dr. Obrien February 16, 2022  D.  Final pathology confirmed 6 cm, grade 2 clear cell carcinoma, invades renal vein, clear surgical margins, and no lymphovascular invasion.  E. Started adjuvant immunotherapy 3/18/2022, status post 13 cycles of treatment.   2.  Hypertension  3.  Postoperative pain  4.  Right upper lobe lung nodule, 1 cm:  A.  Incidentally noted on CT chest done January 20, 2022  5.  Family history of kidney cancer  6. Immune-therapy induced pruritis  7. POT1 gene mutation    HISTORY OF PRESENT ILLNESS: The patient is a very pleasant 57 y.o. male  with past medical history significant for left kidney cancer diagnosed February 2022.  He is status post radical left nephrectomy.  He was started on adjuvant Keytruda March 18, 2022.  The patient is here today for scheduled follow-up visit with treatment cycle #14.    SUBJECTIVE: Alexis is here today by himself.  He is doing great.  Denies any shortness of breath.  He has been trying to keep himself well-hydrated.    Past History:  Medical History: has a past medical history of Cancer (HCC), Hematuria, History of transfusion (02/16/2022), Hypertension, Tattoo, Tinnitus, and Wears glasses.   Surgical History: has a past surgical history that includes Vasectomy; Nephrectomy (Left, 2/16/2022); and Colonoscopy (N/A, 11/4/2022).   Family History: family history includes Cancer in his brother and mother; Heart disease in his father and paternal uncle; Leukemia in his maternal aunt; Skin cancer in his maternal grandfather.   Social History: reports that he quit smoking about 7 years ago. His smoking use included cigarettes. He has a 6.00 pack-year smoking  "history. He quit smokeless tobacco use about 10 months ago.  His smokeless tobacco use included chew. He reports current alcohol use. He reports that he does not use drugs.    (Not in a hospital admission)     Allergies: Patient has no known allergies.     Review of Systems   Constitutional: Positive for fatigue.   Musculoskeletal: Positive for arthralgias.       PHYSICAL EXAMINATION:   /90   Pulse 73   Temp 97.3 °F (36.3 °C) (Infrared)   Resp 18   Ht 180.3 cm (70.98\")   Wt 99.3 kg (219 lb)   SpO2 99%   BMI 30.56 kg/m²    Pain Score    01/20/23 1259   PainSc: 0-No pain     ECOG score: 0        ECOG Performance Status: 1 - Symptomatic but completely ambulatory  General Appearance:  alert, cooperative, no apparent distress and appears stated age   Lungs:    Clear to auscultation bilaterally no wheezing no rhonchi   Heart:   Regular rate and rhythm S1-S2 no murmurs   Abdomen:    Soft nontender not distended bowel sounds are positive     Hospital Outpatient Visit on 01/20/2023   Component Date Value Ref Range Status   • Glucose 01/20/2023 109 (H)  65 - 99 mg/dL Final   • BUN 01/20/2023 23 (H)  6 - 20 mg/dL Final   • Creatinine 01/20/2023 1.55 (H)  0.76 - 1.27 mg/dL Final   • Sodium 01/20/2023 135 (L)  136 - 145 mmol/L Final   • Potassium 01/20/2023 4.4  3.5 - 5.2 mmol/L Final   • Chloride 01/20/2023 99  98 - 107 mmol/L Final   • CO2 01/20/2023 25.0  22.0 - 29.0 mmol/L Final   • Calcium 01/20/2023 9.4  8.6 - 10.5 mg/dL Final   • Total Protein 01/20/2023 6.9  6.0 - 8.5 g/dL Final   • Albumin 01/20/2023 4.4  3.5 - 5.2 g/dL Final   • ALT (SGPT) 01/20/2023 11  1 - 41 U/L Final   • AST (SGOT) 01/20/2023 13  1 - 40 U/L Final   • Alkaline Phosphatase 01/20/2023 84  39 - 117 U/L Final   • Total Bilirubin 01/20/2023 0.5  0.0 - 1.2 mg/dL Final   • Globulin 01/20/2023 2.5  gm/dL Final    Calculated Result   • A/G Ratio 01/20/2023 1.8  g/dL Final   • BUN/Creatinine Ratio 01/20/2023 14.8  7.0 - 25.0 Final   • Anion Gap " 01/20/2023 11.0  5.0 - 15.0 mmol/L Final   • eGFR 01/20/2023 51.9 (L)  >60.0 mL/min/1.73 Final   • WBC 01/20/2023 5.46  3.40 - 10.80 10*3/mm3 Final   • RBC 01/20/2023 4.78  4.14 - 5.80 10*6/mm3 Final   • Hemoglobin 01/20/2023 13.8  13.0 - 17.7 g/dL Final   • Hematocrit 01/20/2023 40.8  37.5 - 51.0 % Final   • MCV 01/20/2023 85.4  79.0 - 97.0 fL Final   • MCH 01/20/2023 28.9  26.6 - 33.0 pg Final   • MCHC 01/20/2023 33.8  31.5 - 35.7 g/dL Final   • RDW 01/20/2023 13.4  12.3 - 15.4 % Final   • RDW-SD 01/20/2023 41.6  37.0 - 54.0 fl Final   • MPV 01/20/2023 8.9  6.0 - 12.0 fL Final   • Platelets 01/20/2023 248  140 - 450 10*3/mm3 Final   • Neutrophil % 01/20/2023 65.9  42.7 - 76.0 % Final   • Lymphocyte % 01/20/2023 22.7  19.6 - 45.3 % Final   • Monocyte % 01/20/2023 9.7  5.0 - 12.0 % Final   • Eosinophil % 01/20/2023 1.3  0.3 - 6.2 % Final   • Basophil % 01/20/2023 0.2  0.0 - 1.5 % Final   • Immature Grans % 01/20/2023 0.2  0.0 - 0.5 % Final   • Neutrophils, Absolute 01/20/2023 3.60  1.70 - 7.00 10*3/mm3 Final   • Lymphocytes, Absolute 01/20/2023 1.24  0.70 - 3.10 10*3/mm3 Final   • Monocytes, Absolute 01/20/2023 0.53  0.10 - 0.90 10*3/mm3 Final   • Eosinophils, Absolute 01/20/2023 0.07  0.00 - 0.40 10*3/mm3 Final   • Basophils, Absolute 01/20/2023 0.01  0.00 - 0.20 10*3/mm3 Final   • Immature Grans, Absolute 01/20/2023 0.01  0.00 - 0.05 10*3/mm3 Final        No results found.    ASSESSMENT: The patient is a very pleasant 57 y.o. male  with left kidney cancer      PLAN:    1.  Left kidney cancer:  A.  I will proceed with adjuvant Keytruda 20 mg IV every 3 weeks cycle # 14.  B.  The patient will follow up with us in 3 weeks for cycle # 15.   C.  We will plan to finish 2 years of treatment if tolerated.    D.  I will continue to monitor the patient's blood work including blood counts, kidney function, liver functions, and electrolytes. We will also continue to monitor thyroid studies periodically.   BOGDAN  I reviewed the  lab results from today with the patient. I reassured him that his blood counts are within normal limits. His creatinine has continued to improve to 1.73. His liver enzymes normal as well as thyroid functions.   F. We reviewed again the potential side effects of immunotherapy including but not limited to immune mediated reactions with thyroiditis, pneumonitis, hepatitis, colitis, rash, and electrolyte abnormalities, fatigue, multiorgan failure, and possibly death.  G.  His 6-month follow-up scans will be due in May 2023.    2.  Right upper lobe lung nodule:  A. The scattered bilateral pulmonary nodules are stable in size. We will continue to monitor these are follow up imaging.   B. He will follow up with Dr. Neff from pulmonary.     3.  Treatment induced nausea:  A.  He will continue use of Zofran as needed for treatment induced nausea.    4.  Hypertension:  A.  I will continue Norvasc 5 mg daily.  B.  We will monitor the patient blood pressure while on treatment.  He would be at risk for hypotension as well as hypertension.    5. Immune-therapy induced pruritis:  A. I encouraged him to keep his skin well moisturized. He will monitor for rash or worsening symptoms.       6. POT1 Gene mutation:  A. I recommended he have annual skin exam due to higher risk for melanoma.   B. We will continue surveillance CT scans for renal cell carcinoma.   C. We will consider MRI brain in the future if he has symptoms.     7.  Elevated creatinine:  A.  I did go over his creatinine result from today and reassured his creatinine improved to 1.55.    B.  The patient was advised to continue to try to increase fluid intake.  C.  He will continue to avoid nephrotoxic agents.  D.  If his creatinine increases we may have to hold treatment and administer steroids for possibility of autoimmune nephritis.  E.  We will do his next scans without contrast to lessen the nephrotoxic effect from contrast dye.     8.  Grade 1 pneumonitis:  A.  The  patient currently be asymptomatic.  B.  I will follow-up on his scans in May which is a 6-month visit.  I will consider doing scans sooner if he has any progressive symptoms.    FOLLOW UP: 3 weeks with next cycle of treatment.    Garrett Pizano MD  1/20/2023

## 2023-01-23 ENCOUNTER — OFFICE VISIT (OUTPATIENT)
Dept: INTERNAL MEDICINE | Facility: CLINIC | Age: 58
End: 2023-01-23
Payer: COMMERCIAL

## 2023-01-23 VITALS
HEART RATE: 87 BPM | SYSTOLIC BLOOD PRESSURE: 157 MMHG | WEIGHT: 222 LBS | TEMPERATURE: 97.8 F | OXYGEN SATURATION: 100 % | HEIGHT: 71 IN | DIASTOLIC BLOOD PRESSURE: 94 MMHG | RESPIRATION RATE: 17 BRPM | BODY MASS INDEX: 31.08 KG/M2

## 2023-01-23 DIAGNOSIS — C64.2 CARCINOMA OF LEFT KIDNEY: ICD-10-CM

## 2023-01-23 DIAGNOSIS — E78.2 MIXED HYPERLIPIDEMIA: ICD-10-CM

## 2023-01-23 DIAGNOSIS — R73.9 HYPERGLYCEMIA: ICD-10-CM

## 2023-01-23 DIAGNOSIS — I10 PRIMARY HYPERTENSION: Primary | ICD-10-CM

## 2023-01-23 PROBLEM — N18.30 STAGE 3 CHRONIC KIDNEY DISEASE: Status: ACTIVE | Noted: 2023-01-23

## 2023-01-23 PROBLEM — E66.812 CLASS 2 SEVERE OBESITY DUE TO EXCESS CALORIES WITH SERIOUS COMORBIDITY IN ADULT: Status: ACTIVE | Noted: 2018-08-14

## 2023-01-23 PROBLEM — E66.01 CLASS 2 SEVERE OBESITY DUE TO EXCESS CALORIES WITH SERIOUS COMORBIDITY IN ADULT: Status: ACTIVE | Noted: 2018-08-14

## 2023-01-23 PROBLEM — E66.3 OVERWEIGHT: Status: RESOLVED | Noted: 2022-06-07 | Resolved: 2023-01-23

## 2023-01-23 PROCEDURE — 99214 OFFICE O/P EST MOD 30 MIN: CPT | Performed by: INTERNAL MEDICINE

## 2023-01-23 RX ORDER — AMLODIPINE BESYLATE 10 MG/1
10 TABLET ORAL DAILY
Qty: 30 TABLET | Refills: 1 | Status: SHIPPED | OUTPATIENT
Start: 2023-01-23 | End: 2023-03-09 | Stop reason: SDUPTHER

## 2023-01-23 NOTE — PROGRESS NOTES
Subjective   Tobias Newberry is a 57 y.o. male.     Chief Complaint   Patient presents with   • Hypertension     Been elevated last few times it was checked   • Hyperlipidemia   • Heartburn   • Follow-up     6 MONTHS       History of Present Illness   Patient here for follow-up.  Blood pressure elevated.  Patient is on amlodipine 5 mg daily.  Below is historical record only: Kidney cancer status post nephrectomy.  Hyperlipidemia stable on diet.  Hyperglycemia on diet is stable.  Weight is elevated.    Current Outpatient Medications:   •  amLODIPine (NORVASC) 10 MG tablet, Take 1 tablet by mouth Daily., Disp: 30 tablet, Rfl: 1  •  INV 1418 pembrolizumab 200 mg in sodium chloride 0.9 % 50 mL, Infuse 200 mg into a venous catheter Every 21 (Twenty-One) Days., Disp: , Rfl:     The following portions of the patient's history were reviewed and updated as appropriate: allergies, current medications, past family history, past medical history, past social history, past surgical history and problem list.    Review of Systems   Constitutional: Negative.    Respiratory: Negative.    Cardiovascular: Negative.    Gastrointestinal: Negative.    Musculoskeletal: Negative.    Skin: Negative.    Neurological: Negative.    Psychiatric/Behavioral: Negative.        Objective   Physical Exam  Cardiovascular:      Rate and Rhythm: Normal rate and regular rhythm.      Heart sounds: Normal heart sounds.   Pulmonary:      Effort: Pulmonary effort is normal.      Breath sounds: Normal breath sounds.   Abdominal:      General: Bowel sounds are normal.   Musculoskeletal:      Cervical back: Neck supple.   Skin:     General: Skin is warm.   Neurological:      Mental Status: He is alert and oriented to person, place, and time.         All tests have been reviewed.    Assessment & Plan   Diagnoses and all orders for this visit:    Primary hypertension increase amlodipine to 10 mg daily  -     amLODIPine (NORVASC) 10 MG tablet; Take 1 tablet by  mouth Daily.    Mixed hyperlipidemia continue good diet    Hyperglycemia continue good diet    Carcinoma of left kidney (HCC)          1 month follow-up with blood pressure       Below is to historical records for reference only:  Left radical nephrectomy, thrombectomy, and adrenalectomy.   HTN increase norvasc to 5 mg qd  tob quit already   Cr elevation   Arthritis stable now   Hyperglycemia  Do lab  Lung nodule cont watch by onc    GERD improved after good diet patient is not taking medication  Right thumb pain due to repetitive work.  Watch for now   Family history of diabetes  Possible kidney cancer in the family.    Patient uses tobacco.    loss of hearing  Colon ca screen scope schedule   Borderline, BP, watch for now, need to loose weight and low salt  Hyperlipidemia, diet for now  Easy bruise mild.

## 2023-01-31 PROBLEM — E66.01 CLASS 2 SEVERE OBESITY DUE TO EXCESS CALORIES WITH SERIOUS COMORBIDITY IN ADULT (HCC): Status: RESOLVED | Noted: 2018-08-14 | Resolved: 2023-01-31

## 2023-01-31 PROBLEM — E66.812 CLASS 2 SEVERE OBESITY DUE TO EXCESS CALORIES WITH SERIOUS COMORBIDITY IN ADULT: Status: RESOLVED | Noted: 2018-08-14 | Resolved: 2023-01-31

## 2023-02-10 ENCOUNTER — HOSPITAL ENCOUNTER (OUTPATIENT)
Dept: ONCOLOGY | Facility: HOSPITAL | Age: 58
Discharge: HOME | End: 2023-02-10
Admitting: INTERNAL MEDICINE
Payer: COMMERCIAL

## 2023-02-10 ENCOUNTER — APPOINTMENT (OUTPATIENT)
Dept: ONCOLOGY | Facility: HOSPITAL | Age: 58
End: 2023-02-10
Payer: COMMERCIAL

## 2023-02-10 ENCOUNTER — OFFICE VISIT (OUTPATIENT)
Dept: ONCOLOGY | Facility: CLINIC | Age: 58
End: 2023-02-10
Payer: COMMERCIAL

## 2023-02-10 VITALS
SYSTOLIC BLOOD PRESSURE: 144 MMHG | HEART RATE: 87 BPM | WEIGHT: 217 LBS | OXYGEN SATURATION: 99 % | HEIGHT: 71 IN | DIASTOLIC BLOOD PRESSURE: 84 MMHG | TEMPERATURE: 97.5 F | BODY MASS INDEX: 30.38 KG/M2 | RESPIRATION RATE: 18 BRPM

## 2023-02-10 DIAGNOSIS — C64.2 CARCINOMA OF LEFT KIDNEY: Primary | ICD-10-CM

## 2023-02-10 LAB
ALBUMIN SERPL-MCNC: 4.5 G/DL (ref 3.5–5.2)
ALBUMIN/GLOB SERPL: 1.6 G/DL
ALP SERPL-CCNC: 81 U/L (ref 39–117)
ALT SERPL W P-5'-P-CCNC: 12 U/L (ref 1–41)
ANION GAP SERPL CALCULATED.3IONS-SCNC: 14 MMOL/L (ref 5–15)
AST SERPL-CCNC: 17 U/L (ref 1–40)
BASOPHILS # BLD AUTO: 0.02 10*3/MM3 (ref 0–0.2)
BASOPHILS NFR BLD AUTO: 0.2 % (ref 0–1.5)
BILIRUB SERPL-MCNC: 0.4 MG/DL (ref 0–1.2)
BUN SERPL-MCNC: 20 MG/DL (ref 6–20)
BUN/CREAT SERPL: 14.2 (ref 7–25)
CALCIUM SPEC-SCNC: 9.7 MG/DL (ref 8.6–10.5)
CHLORIDE SERPL-SCNC: 98 MMOL/L (ref 98–107)
CO2 SERPL-SCNC: 24 MMOL/L (ref 22–29)
CREAT SERPL-MCNC: 1.41 MG/DL (ref 0.76–1.27)
DEPRECATED RDW RBC AUTO: 42.2 FL (ref 37–54)
EGFRCR SERPLBLD CKD-EPI 2021: 58.1 ML/MIN/1.73
EOSINOPHIL # BLD AUTO: 0.14 10*3/MM3 (ref 0–0.4)
EOSINOPHIL NFR BLD AUTO: 1.6 % (ref 0.3–6.2)
ERYTHROCYTE [DISTWIDTH] IN BLOOD BY AUTOMATED COUNT: 13.3 % (ref 12.3–15.4)
GLOBULIN UR ELPH-MCNC: 2.9 GM/DL
GLUCOSE SERPL-MCNC: 103 MG/DL (ref 65–99)
HCT VFR BLD AUTO: 41.6 % (ref 37.5–51)
HGB BLD-MCNC: 14.1 G/DL (ref 13–17.7)
IMM GRANULOCYTES # BLD AUTO: 0.02 10*3/MM3 (ref 0–0.05)
IMM GRANULOCYTES NFR BLD AUTO: 0.2 % (ref 0–0.5)
LYMPHOCYTES # BLD AUTO: 1.52 10*3/MM3 (ref 0.7–3.1)
LYMPHOCYTES NFR BLD AUTO: 17.5 % (ref 19.6–45.3)
MCH RBC QN AUTO: 29.3 PG (ref 26.6–33)
MCHC RBC AUTO-ENTMCNC: 33.9 G/DL (ref 31.5–35.7)
MCV RBC AUTO: 86.3 FL (ref 79–97)
MONOCYTES # BLD AUTO: 0.72 10*3/MM3 (ref 0.1–0.9)
MONOCYTES NFR BLD AUTO: 8.3 % (ref 5–12)
NEUTROPHILS NFR BLD AUTO: 6.29 10*3/MM3 (ref 1.7–7)
NEUTROPHILS NFR BLD AUTO: 72.2 % (ref 42.7–76)
PLATELET # BLD AUTO: 281 10*3/MM3 (ref 140–450)
PMV BLD AUTO: 8.4 FL (ref 6–12)
POTASSIUM SERPL-SCNC: 4.6 MMOL/L (ref 3.5–5.2)
PROT SERPL-MCNC: 7.4 G/DL (ref 6–8.5)
RBC # BLD AUTO: 4.82 10*6/MM3 (ref 4.14–5.8)
SODIUM SERPL-SCNC: 136 MMOL/L (ref 136–145)
WBC NRBC COR # BLD: 8.71 10*3/MM3 (ref 3.4–10.8)

## 2023-02-10 PROCEDURE — 96413 CHEMO IV INFUSION 1 HR: CPT

## 2023-02-10 PROCEDURE — 99214 OFFICE O/P EST MOD 30 MIN: CPT | Performed by: NURSE PRACTITIONER

## 2023-02-10 PROCEDURE — 80053 COMPREHEN METABOLIC PANEL: CPT | Performed by: INTERNAL MEDICINE

## 2023-02-10 PROCEDURE — 25010000002 PEMBROLIZUMAB 100 MG/4ML SOLUTION 4 ML VIAL: Performed by: INTERNAL MEDICINE

## 2023-02-10 PROCEDURE — 85025 COMPLETE CBC W/AUTO DIFF WBC: CPT | Performed by: INTERNAL MEDICINE

## 2023-02-10 RX ORDER — SODIUM CHLORIDE 9 MG/ML
250 INJECTION, SOLUTION INTRAVENOUS ONCE
Status: COMPLETED | OUTPATIENT
Start: 2023-02-10 | End: 2023-02-10

## 2023-02-10 RX ADMIN — SODIUM CHLORIDE 250 ML: 9 INJECTION, SOLUTION INTRAVENOUS at 11:58

## 2023-02-10 RX ADMIN — SODIUM CHLORIDE 200 MG: 9 INJECTION, SOLUTION INTRAVENOUS at 11:58

## 2023-02-10 NOTE — PROGRESS NOTES
DATE OF VISIT: 2/10/2023    REASON FOR VISIT: Followup for left kidney cancer     PROBLEM LIST:  1. 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  Presented with hematuria and back pain  B.  CT abdomen pelvis done January 16, 2022 revealed 6 cm left kidney mass  C.  Status post radical nephrectomy done by Dr. Obiren February 16, 2022  D.  Final pathology confirmed 6 cm, grade 2 clear cell carcinoma, invades renal vein, clear surgical margins, and no lymphovascular invasion.  E. Started adjuvant immunotherapy 3/18/2022, status post 15 cycles of treatment.   2.  Hypertension  3.  Postoperative pain  4.  Right upper lobe lung nodule, 1 cm:  A.  Incidentally noted on CT chest done January 20, 2022  5.  Family history of kidney cancer  6. Immune-therapy induced pruritis  7. POT1 gene mutation    HISTORY OF PRESENT ILLNESS: The patient is a very pleasant 57 y.o. male  with past medical history significant for left kidney cancer diagnosed February 2022.  He is status post radical left nephrectomy.  He was started on adjuvant Keytruda March 18, 2022.  The patient is here today for scheduled follow-up visit with treatment cycle #16.    SUBJECTIVE: The patient is here today by himself. He has been doing fairly well. He did have an episode of fever, with cough and right shoulder pain that resolved with Tylenol and has not recurred. He does, however, complains of increased cough that is worse at night when he lies down. He denies shortness of breath or further episodes of chest discomfort. He has had no diarrhea or abdominal pain. He had one episode of constipation that was relieved with use of laxatives, and has not been a problem since.      Past History:  Medical History: has a past medical history of Cancer (HCC), Hematuria, History of transfusion (02/16/2022), Hypertension, Tattoo, Tinnitus, and Wears glasses.   Surgical History: has a past surgical history that includes Vasectomy; Nephrectomy (Left, 2/16/2022);  "and Colonoscopy (N/A, 11/4/2022).   Family History: family history includes Cancer in his brother and mother; Heart disease in his father and paternal uncle; Leukemia in his maternal aunt; Skin cancer in his maternal grandfather.   Social History: reports that he quit smoking about 7 years ago. His smoking use included cigarettes. He has a 6.00 pack-year smoking history. He quit smokeless tobacco use about 11 months ago.  His smokeless tobacco use included chew. He reports current alcohol use. He reports that he does not use drugs.    (Not in a hospital admission)     Allergies: Patient has no known allergies.     Review of Systems   Constitutional: Positive for fatigue and fever.        One episode of fever   Respiratory: Positive for cough.    Gastrointestinal: Positive for constipation.   Musculoskeletal: Positive for arthralgias.        Right shoulder pain       PHYSICAL EXAMINATION:   /84   Pulse 87   Temp 97.5 °F (36.4 °C) (Infrared)   Resp 18   Ht 180.3 cm (70.98\")   Wt 98.4 kg (217 lb)   SpO2 99%   BMI 30.28 kg/m²    Pain Score    02/10/23 1047   PainSc: 0-No pain     ECOG score: 0        ECOG Performance Status: 0 - Asymptomatic  General Appearance:  alert, cooperative, no apparent distress and appears stated age   Lungs:    Clear to auscultation bilaterally no wheezing no rhonchi   Heart:   Regular rate and rhythm S1-S2 no murmurs   Abdomen:    Soft nontender not distended bowel sounds are positive     Hospital Outpatient Visit on 02/10/2023   Component Date Value Ref Range Status   • WBC 02/10/2023 8.71  3.40 - 10.80 10*3/mm3 Final   • RBC 02/10/2023 4.82  4.14 - 5.80 10*6/mm3 Final   • Hemoglobin 02/10/2023 14.1  13.0 - 17.7 g/dL Final   • Hematocrit 02/10/2023 41.6  37.5 - 51.0 % Final   • MCV 02/10/2023 86.3  79.0 - 97.0 fL Final   • MCH 02/10/2023 29.3  26.6 - 33.0 pg Final   • MCHC 02/10/2023 33.9  31.5 - 35.7 g/dL Final   • RDW 02/10/2023 13.3  12.3 - 15.4 % Final   • RDW-SD 02/10/2023 " 42.2  37.0 - 54.0 fl Final   • MPV 02/10/2023 8.4  6.0 - 12.0 fL Final   • Platelets 02/10/2023 281  140 - 450 10*3/mm3 Final   • Neutrophil % 02/10/2023 72.2  42.7 - 76.0 % Final   • Lymphocyte % 02/10/2023 17.5 (L)  19.6 - 45.3 % Final   • Monocyte % 02/10/2023 8.3  5.0 - 12.0 % Final   • Eosinophil % 02/10/2023 1.6  0.3 - 6.2 % Final   • Basophil % 02/10/2023 0.2  0.0 - 1.5 % Final   • Immature Grans % 02/10/2023 0.2  0.0 - 0.5 % Final   • Neutrophils, Absolute 02/10/2023 6.29  1.70 - 7.00 10*3/mm3 Final   • Lymphocytes, Absolute 02/10/2023 1.52  0.70 - 3.10 10*3/mm3 Final   • Monocytes, Absolute 02/10/2023 0.72  0.10 - 0.90 10*3/mm3 Final   • Eosinophils, Absolute 02/10/2023 0.14  0.00 - 0.40 10*3/mm3 Final   • Basophils, Absolute 02/10/2023 0.02  0.00 - 0.20 10*3/mm3 Final   • Immature Grans, Absolute 02/10/2023 0.02  0.00 - 0.05 10*3/mm3 Final        No results found.    ASSESSMENT: The patient is a very pleasant 57 y.o. male  with left kidney cancer      PLAN:    1.  Left kidney cancer:  A.  I will proceed with adjuvant Keytruda 20 mg IV every 3 weeks cycle # 16.  B.  The patient will follow up with us in 3 weeks for cycle # 17.   C.  We will plan to finish 2 years of treatment if tolerated.    D.  I will continue to monitor the patient's blood work including blood counts, kidney function, liver functions, and electrolytes. We will also continue to monitor thyroid studies periodically.   E.  I reviewed the lab results from today with the patient. I reassured him that his blood counts are normal. We will follow up on the CMP results. His last creatinine was improved to 1.55.   F. We reviewed again the potential side effects of immunotherapy including but not limited to immune mediated reactions with thyroiditis, pneumonitis, hepatitis, colitis, rash, and electrolyte abnormalities, fatigue, multiorgan failure, and possibly death.    2.  Right upper lobe lung nodule:  A. The scattered bilateral pulmonary nodules  are stable in size. We will continue to monitor these are follow up imaging.   B. He will follow up with Dr. Neff from pulmonary.     3.  Treatment induced nausea:  A.  He will continue use of Zofran as needed for treatment induced nausea.    4.  Hypertension:  A.  I will continue [id number] mg daily.   B. His dose was recently increased by his primary care provider secondary to persistent elevation in blood pressure. He will continue to monitor his blood pressure at home.     5. Immune-therapy induced pruritis:  A. I encouraged him to keep his skin well moisturized.      6. POT1 Gene mutation:  A. I recommended he have annual skin exam due to higher risk for melanoma.   B. We will continue surveillance CT scans for renal cell carcinoma.   C. We will consider MRI brain in the future if he develops neurologic symptoms.     7.  Elevated creatinine:  A.  He creatinine from 1/20/2023 was improved to 1.55.   B.  The patient was advised to continue to try to increase fluid intake.  C.  He will continue to avoid nephrotoxic agents.  D.  If his creatinine increases we may have to hold treatment and administer steroids for possibility of autoimmune nephritis.    8.  Grade 1 pneumonitis:  A.  We will go ahead and order CT chest without contrast to evaluate his pneumonitis due to symptoms of increased cough and to follow up on right upper lobe inflammatory changes noted on CT done November 2022.   B. If he has progressive pneumonitis on imaging with worsening symptoms, we will hold next treatment and start him on steroids.     FOLLOW UP: 3 weeks with next cycle of treatment.    Yolanda Shaffer, APRN  2/10/2023

## 2023-02-27 ENCOUNTER — HOSPITAL ENCOUNTER (OUTPATIENT)
Dept: CT IMAGING | Facility: HOSPITAL | Age: 58
Discharge: HOME | End: 2023-02-27
Admitting: NURSE PRACTITIONER
Payer: COMMERCIAL

## 2023-02-27 DIAGNOSIS — C64.2 CARCINOMA OF LEFT KIDNEY: ICD-10-CM

## 2023-02-27 PROCEDURE — 71250 CT THORAX DX C-: CPT

## 2023-03-01 DIAGNOSIS — C64.2 CARCINOMA OF LEFT KIDNEY: Primary | ICD-10-CM

## 2023-03-06 ENCOUNTER — HOSPITAL ENCOUNTER (OUTPATIENT)
Dept: ONCOLOGY | Facility: HOSPITAL | Age: 58
Discharge: HOME | End: 2023-03-06
Admitting: NURSE PRACTITIONER
Payer: COMMERCIAL

## 2023-03-06 ENCOUNTER — OFFICE VISIT (OUTPATIENT)
Dept: ONCOLOGY | Facility: CLINIC | Age: 58
End: 2023-03-06
Payer: COMMERCIAL

## 2023-03-06 ENCOUNTER — HOSPITAL ENCOUNTER (OUTPATIENT)
Dept: ONCOLOGY | Facility: HOSPITAL | Age: 58
Discharge: HOME | End: 2023-03-06
Payer: COMMERCIAL

## 2023-03-06 VITALS
HEART RATE: 88 BPM | RESPIRATION RATE: 18 BRPM | WEIGHT: 224 LBS | BODY MASS INDEX: 31.36 KG/M2 | OXYGEN SATURATION: 97 % | TEMPERATURE: 97.3 F | DIASTOLIC BLOOD PRESSURE: 101 MMHG | HEIGHT: 71 IN | SYSTOLIC BLOOD PRESSURE: 169 MMHG

## 2023-03-06 DIAGNOSIS — C64.2 CARCINOMA OF LEFT KIDNEY: Primary | ICD-10-CM

## 2023-03-06 LAB
ALBUMIN SERPL-MCNC: 4.1 G/DL (ref 3.5–5.2)
ALBUMIN/GLOB SERPL: 1.4 G/DL
ALP SERPL-CCNC: 91 U/L (ref 39–117)
ALT SERPL W P-5'-P-CCNC: 11 U/L (ref 1–41)
ANION GAP SERPL CALCULATED.3IONS-SCNC: 12 MMOL/L (ref 5–15)
AST SERPL-CCNC: 16 U/L (ref 1–40)
BASOPHILS # BLD AUTO: 0.02 10*3/MM3 (ref 0–0.2)
BASOPHILS NFR BLD AUTO: 0.3 % (ref 0–1.5)
BILIRUB SERPL-MCNC: 0.3 MG/DL (ref 0–1.2)
BUN SERPL-MCNC: 17 MG/DL (ref 6–20)
BUN/CREAT SERPL: 11.3 (ref 7–25)
CALCIUM SPEC-SCNC: 9.4 MG/DL (ref 8.6–10.5)
CHLORIDE SERPL-SCNC: 100 MMOL/L (ref 98–107)
CO2 SERPL-SCNC: 25 MMOL/L (ref 22–29)
CREAT SERPL-MCNC: 1.51 MG/DL (ref 0.76–1.27)
DEPRECATED RDW RBC AUTO: 43.6 FL (ref 37–54)
EGFRCR SERPLBLD CKD-EPI 2021: 53.5 ML/MIN/1.73
EOSINOPHIL # BLD AUTO: 0.11 10*3/MM3 (ref 0–0.4)
EOSINOPHIL NFR BLD AUTO: 1.6 % (ref 0.3–6.2)
ERYTHROCYTE [DISTWIDTH] IN BLOOD BY AUTOMATED COUNT: 14 % (ref 12.3–15.4)
GLOBULIN UR ELPH-MCNC: 2.9 GM/DL
GLUCOSE SERPL-MCNC: 120 MG/DL (ref 65–99)
HCT VFR BLD AUTO: 40.8 % (ref 37.5–51)
HGB BLD-MCNC: 13.9 G/DL (ref 13–17.7)
IMM GRANULOCYTES # BLD AUTO: 0.03 10*3/MM3 (ref 0–0.05)
IMM GRANULOCYTES NFR BLD AUTO: 0.4 % (ref 0–0.5)
LYMPHOCYTES # BLD AUTO: 1.43 10*3/MM3 (ref 0.7–3.1)
LYMPHOCYTES NFR BLD AUTO: 20.8 % (ref 19.6–45.3)
MCH RBC QN AUTO: 29.2 PG (ref 26.6–33)
MCHC RBC AUTO-ENTMCNC: 34.1 G/DL (ref 31.5–35.7)
MCV RBC AUTO: 85.7 FL (ref 79–97)
MONOCYTES # BLD AUTO: 0.63 10*3/MM3 (ref 0.1–0.9)
MONOCYTES NFR BLD AUTO: 9.2 % (ref 5–12)
NEUTROPHILS NFR BLD AUTO: 4.64 10*3/MM3 (ref 1.7–7)
NEUTROPHILS NFR BLD AUTO: 67.7 % (ref 42.7–76)
PLATELET # BLD AUTO: 262 10*3/MM3 (ref 140–450)
PMV BLD AUTO: 8.6 FL (ref 6–12)
POTASSIUM SERPL-SCNC: 4.7 MMOL/L (ref 3.5–5.2)
PROT SERPL-MCNC: 7 G/DL (ref 6–8.5)
RBC # BLD AUTO: 4.76 10*6/MM3 (ref 4.14–5.8)
SODIUM SERPL-SCNC: 137 MMOL/L (ref 136–145)
T4 FREE SERPL-MCNC: 1.41 NG/DL (ref 0.93–1.7)
TSH SERPL DL<=0.05 MIU/L-ACNC: 1.08 UIU/ML (ref 0.27–4.2)
WBC NRBC COR # BLD: 6.86 10*3/MM3 (ref 3.4–10.8)

## 2023-03-06 PROCEDURE — 84439 ASSAY OF FREE THYROXINE: CPT | Performed by: NURSE PRACTITIONER

## 2023-03-06 PROCEDURE — 96413 CHEMO IV INFUSION 1 HR: CPT

## 2023-03-06 PROCEDURE — 99214 OFFICE O/P EST MOD 30 MIN: CPT | Performed by: INTERNAL MEDICINE

## 2023-03-06 PROCEDURE — 80050 GENERAL HEALTH PANEL: CPT | Performed by: NURSE PRACTITIONER

## 2023-03-06 PROCEDURE — 25010000002 PEMBROLIZUMAB 100 MG/4ML SOLUTION 4 ML VIAL: Performed by: INTERNAL MEDICINE

## 2023-03-06 RX ORDER — SODIUM CHLORIDE 9 MG/ML
250 INJECTION, SOLUTION INTRAVENOUS ONCE
Status: CANCELLED | OUTPATIENT
Start: 2023-03-06

## 2023-03-06 RX ADMIN — SODIUM CHLORIDE 200 MG: 9 INJECTION, SOLUTION INTRAVENOUS at 10:25

## 2023-03-06 NOTE — PROGRESS NOTES
DATE OF VISIT: 3/6/2023    REASON FOR VISIT: Followup for left kidney cancer     PROBLEM LIST:  1. 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  Presented with hematuria and back pain  B.  CT abdomen pelvis done January 16, 2022 revealed 6 cm left kidney mass  C.  Status post radical nephrectomy done by Dr. Obrien February 16, 2022  D.  Final pathology confirmed 6 cm, grade 2 clear cell carcinoma, invades renal vein, clear surgical margins, and no lymphovascular invasion.  E. Started adjuvant immunotherapy 3/18/2022, status post 16 cycles of treatment.   2.  Hypertension  3.  Postoperative pain  4.  Right upper lobe lung nodule, 1 cm:  A.  Incidentally noted on CT chest done January 20, 2022  5.  Family history of kidney cancer  6. Immune-therapy induced pruritis  7. POT1 gene mutation    HISTORY OF PRESENT ILLNESS: The patient is a very pleasant 57 y.o. male  with past medical history significant for left kidney cancer diagnosed February 2022.  He is status post radical left nephrectomy.  He was started on adjuvant Keytruda March 18, 2022.  The patient is here today for scheduled follow-up visit with treatment cycle #17.    SUBJECTIVE: Jefry is here today by himself.  His shortness of breath has improved.  His fever has resolved.  He is anxious about the scan results.    Past History:  Medical History: has a past medical history of Cancer (HCC), Hematuria, History of transfusion (02/16/2022), Hypertension, Tattoo, Tinnitus, and Wears glasses.   Surgical History: has a past surgical history that includes Vasectomy; Nephrectomy (Left, 2/16/2022); and Colonoscopy (N/A, 11/4/2022).   Family History: family history includes Cancer in his brother and mother; Heart disease in his father and paternal uncle; Leukemia in his maternal aunt; Skin cancer in his maternal grandfather.   Social History: reports that he quit smoking about 7 years ago. His smoking use included cigarettes. He has a 6.00 pack-year smoking  history. He quit smokeless tobacco use about a year ago.  His smokeless tobacco use included chew. He reports current alcohol use. He reports that he does not use drugs.    (Not in a hospital admission)     Allergies: Patient has no known allergies.     Review of Systems   Constitutional: Positive for fatigue.   Gastrointestinal: Positive for constipation.   Musculoskeletal: Positive for arthralgias.       PHYSICAL EXAMINATION:   There were no vitals taken for this visit.   There were no vitals filed for this visit.           ECOG Performance Status: 1 - Symptomatic but completely ambulatory  General Appearance:  alert, cooperative, no apparent distress and appears stated age   Lungs:    Clear to auscultation bilaterally no wheezing no rhonchi   Heart:   Regular rate and rhythm S1-S2 no murmurs   Abdomen:    Soft nontender not distended bowel sounds are positive     No visits with results within 2 Week(s) from this visit.   Latest known visit with results is:   Hospital Outpatient Visit on 02/10/2023   Component Date Value Ref Range Status   • Glucose 02/10/2023 103 (H)  65 - 99 mg/dL Final   • BUN 02/10/2023 20  6 - 20 mg/dL Final   • Creatinine 02/10/2023 1.41 (H)  0.76 - 1.27 mg/dL Final   • Sodium 02/10/2023 136  136 - 145 mmol/L Final   • Potassium 02/10/2023 4.6  3.5 - 5.2 mmol/L Final    Slight hemolysis detected by analyzer. Results may be affected.   • Chloride 02/10/2023 98  98 - 107 mmol/L Final   • CO2 02/10/2023 24.0  22.0 - 29.0 mmol/L Final   • Calcium 02/10/2023 9.7  8.6 - 10.5 mg/dL Final   • Total Protein 02/10/2023 7.4  6.0 - 8.5 g/dL Final   • Albumin 02/10/2023 4.5  3.5 - 5.2 g/dL Final   • ALT (SGPT) 02/10/2023 12  1 - 41 U/L Final   • AST (SGOT) 02/10/2023 17  1 - 40 U/L Final   • Alkaline Phosphatase 02/10/2023 81  39 - 117 U/L Final   • Total Bilirubin 02/10/2023 0.4  0.0 - 1.2 mg/dL Final   • Globulin 02/10/2023 2.9  gm/dL Final    Calculated Result   • A/G Ratio 02/10/2023 1.6  g/dL Final    • BUN/Creatinine Ratio 02/10/2023 14.2  7.0 - 25.0 Final   • Anion Gap 02/10/2023 14.0  5.0 - 15.0 mmol/L Final   • eGFR 02/10/2023 58.1 (L)  >60.0 mL/min/1.73 Final   • WBC 02/10/2023 8.71  3.40 - 10.80 10*3/mm3 Final   • RBC 02/10/2023 4.82  4.14 - 5.80 10*6/mm3 Final   • Hemoglobin 02/10/2023 14.1  13.0 - 17.7 g/dL Final   • Hematocrit 02/10/2023 41.6  37.5 - 51.0 % Final   • MCV 02/10/2023 86.3  79.0 - 97.0 fL Final   • MCH 02/10/2023 29.3  26.6 - 33.0 pg Final   • MCHC 02/10/2023 33.9  31.5 - 35.7 g/dL Final   • RDW 02/10/2023 13.3  12.3 - 15.4 % Final   • RDW-SD 02/10/2023 42.2  37.0 - 54.0 fl Final   • MPV 02/10/2023 8.4  6.0 - 12.0 fL Final   • Platelets 02/10/2023 281  140 - 450 10*3/mm3 Final   • Neutrophil % 02/10/2023 72.2  42.7 - 76.0 % Final   • Lymphocyte % 02/10/2023 17.5 (L)  19.6 - 45.3 % Final   • Monocyte % 02/10/2023 8.3  5.0 - 12.0 % Final   • Eosinophil % 02/10/2023 1.6  0.3 - 6.2 % Final   • Basophil % 02/10/2023 0.2  0.0 - 1.5 % Final   • Immature Grans % 02/10/2023 0.2  0.0 - 0.5 % Final   • Neutrophils, Absolute 02/10/2023 6.29  1.70 - 7.00 10*3/mm3 Final   • Lymphocytes, Absolute 02/10/2023 1.52  0.70 - 3.10 10*3/mm3 Final   • Monocytes, Absolute 02/10/2023 0.72  0.10 - 0.90 10*3/mm3 Final   • Eosinophils, Absolute 02/10/2023 0.14  0.00 - 0.40 10*3/mm3 Final   • Basophils, Absolute 02/10/2023 0.02  0.00 - 0.20 10*3/mm3 Final   • Immature Grans, Absolute 02/10/2023 0.02  0.00 - 0.05 10*3/mm3 Final        CT Chest Without Contrast Diagnostic    Result Date: 2/28/2023  Narrative: PROCEDURE: CT CHEST WO CONTRAST DIAGNOSTIC-  HISTORY: restaging renal cell, increased cough, possible pneumonitis; C64.2-Malignant neoplasm of left kidney, except renal pelvis  COMPARISON: 11/23/2022  PROCEDURE: Axial images were obtained from the lung apex to the mid abdomen by computed tomography. This study was performed with techniques to keep radiation doses as low as reasonably achievable, (ALARA).  Individualized dose reduction techniques using automated exposure control or adjustment of mA and/or kV according to the patient size were employed.  FINDINGS:  CHEST: There is no suspicious axillary adenopathy. The hilar adenopathy appears similar to prior exam although less definitive without the presence of IV contrast. No new mediastinal lymph nodes identified.. The heart is proper size. There is no pericardial or pleural effusion.The ground glass opacities and noncalcified nodules in the left lung are stable. There has been slight interval improvement in the patchy airspace disease anteriorly in the right upper lobe but remaining nodular densities are stable. Limited images of the upper abdomen are unremarkable.      Impression: Mild interval improvement in right upper lobe airspace disease suggesting infectious/inflammatory process.  Persistent remaining bilateral patchy airspace disease and noncalcified nodules which may represent metastatic disease; recommend continued follow-up and/or PET CT..    This report was signed and finalized on 2/28/2023 7:25 AM by Zoë Fontaine MD.      ASSESSMENT: The patient is a very pleasant 57 y.o. male  with left kidney cancer      PLAN:    1.  Left kidney cancer:  A.  I will proceed with adjuvant Keytruda 20 mg IV every 3 weeks cycle # 17.  We will complete his full year of treatment.  B.  I will discontinue treatment after today given his poor tolerance with shortness of breath and evidence of pneumonitis on his current scan.  C.  I will continue to monitor the patient's blood work including blood counts, kidney function, liver functions, and electrolytes. We will also continue to monitor thyroid studies periodically.   D.  I reviewed the lab results from today with the patient. I reassured him that his blood counts are normal. We will follow up on the CMP results. His last creatinine was improved to 1.55.   E. We reviewed again the potential side effects of immunotherapy  including but not limited to immune mediated reactions with thyroiditis, pneumonitis, hepatitis, colitis, rash, and electrolyte abnormalities, fatigue, multiorgan failure, and possibly death.  F.  I did go over his scan results from 3/20/2023.  His bilateral lung infiltrates are better but he still has persistent inflammatory changes.  At this point I will discontinue treatment given the fact that he has completed a full year.  I will see him back in 3 months with CT chest in 6 months CT chest abdomen pelvis.  We will continue to avoid contrast secondary chronic kidney disease.    2.  Right upper lobe lung nodule:  A. The scattered bilateral pulmonary nodules are stable in size. We will continue to monitor these are follow up imaging.   B. He will follow up with Dr. Neff from pulmonary.     3.  Treatment induced nausea:  A.  He will continue use of Zofran as needed for treatment induced nausea.    4.  Hypertension:  A.  I will continue [id number] mg daily.   B. His dose was recently increased by his primary care provider secondary to persistent elevation in blood pressure. He will continue to monitor his blood pressure at home.     5. Immune-therapy induced pruritis:  A. I encouraged him to keep his skin well moisturized.      6. POT1 Gene mutation:  A. I recommended he have annual skin exam due to higher risk for melanoma.   B. We will continue surveillance CT scans for renal cell carcinoma.   C. We will consider MRI brain in the future if he develops neurologic symptoms.     7.  Elevated creatinine:  A.  He creatinine from 1/20/2023 was improved to 1.55.   B.  The patient was advised to continue to try to increase fluid intake.  C.  He will continue to avoid nephrotoxic agents.  D.  If his creatinine increases we may have to hold treatment and administer steroids for possibility of autoimmune nephritis.    8.  Grade 2 pneumonitis:  A.  While CT chest is slightly better he still has evidence of bilateral lung  infiltrates.  B.  I will discontinue treatment after today.    FOLLOW UP: 3 months with repeated scan.    Garrett Pizano MD  3/6/2023

## 2023-03-09 ENCOUNTER — OFFICE VISIT (OUTPATIENT)
Dept: INTERNAL MEDICINE | Facility: CLINIC | Age: 58
End: 2023-03-09
Payer: COMMERCIAL

## 2023-03-09 VITALS
DIASTOLIC BLOOD PRESSURE: 100 MMHG | HEIGHT: 71 IN | OXYGEN SATURATION: 98 % | WEIGHT: 226 LBS | TEMPERATURE: 97.1 F | HEART RATE: 89 BPM | BODY MASS INDEX: 31.64 KG/M2 | SYSTOLIC BLOOD PRESSURE: 162 MMHG

## 2023-03-09 DIAGNOSIS — N18.30 STAGE 3 CHRONIC KIDNEY DISEASE, UNSPECIFIED WHETHER STAGE 3A OR 3B CKD: ICD-10-CM

## 2023-03-09 DIAGNOSIS — I10 PRIMARY HYPERTENSION: Primary | ICD-10-CM

## 2023-03-09 DIAGNOSIS — R60.0 BILATERAL LEG EDEMA: ICD-10-CM

## 2023-03-09 PROCEDURE — 99214 OFFICE O/P EST MOD 30 MIN: CPT | Performed by: INTERNAL MEDICINE

## 2023-03-09 RX ORDER — NADOLOL 40 MG/1
40 TABLET ORAL DAILY
Qty: 30 TABLET | Refills: 1 | Status: SHIPPED | OUTPATIENT
Start: 2023-03-09

## 2023-03-09 RX ORDER — FUROSEMIDE 20 MG/1
20 TABLET ORAL DAILY PRN
Qty: 30 TABLET | Refills: 1 | Status: SHIPPED | OUTPATIENT
Start: 2023-03-09

## 2023-03-09 RX ORDER — AMLODIPINE BESYLATE 5 MG/1
5 TABLET ORAL DAILY
Qty: 30 TABLET | Refills: 1 | Status: SHIPPED | OUTPATIENT
Start: 2023-03-09

## 2023-03-09 NOTE — PROGRESS NOTES
Subjective   Tobias Newberry is a 57 y.o. male.     Chief Complaint   Patient presents with   • Follow-up   • Hypertension     Symptoms persist; increased swelling in lower extremities        History of Present Illness   6 week follow up patient here for follow-upBlood pressure still elevated amlodipine 10 mg caused significant swelling in lower extremities.  History of nephrectomy kidney function declined.    Current Outpatient Medications:   •  amLODIPine (NORVASC) 5 MG tablet, Take 1 tablet by mouth Daily., Disp: 30 tablet, Rfl: 1  •  furosemide (Lasix) 20 MG tablet, Take 1 tablet by mouth Daily As Needed (leg swelling)., Disp: 30 tablet, Rfl: 1  •  INV 1418 pembrolizumab 200 mg in sodium chloride 0.9 % 50 mL, Infuse 200 mg into a venous catheter Every 21 (Twenty-One) Days., Disp: , Rfl:   •  nadolol (Corgard) 40 MG tablet, Take 1 tablet by mouth Daily., Disp: 30 tablet, Rfl: 1    The following portions of the patient's history were reviewed and updated as appropriate: allergies, current medications, past family history, past medical history, past social history, past surgical history and problem list.    Review of Systems   Constitutional: Negative.    Respiratory: Negative.    Cardiovascular: Negative.    Gastrointestinal: Negative.    Musculoskeletal: Negative.    Skin: Negative.    Neurological: Negative.    Psychiatric/Behavioral: Negative.        Objective   Physical Exam  Cardiovascular:      Rate and Rhythm: Normal rate and regular rhythm.      Heart sounds: Normal heart sounds.   Pulmonary:      Effort: Pulmonary effort is normal.      Breath sounds: Normal breath sounds.   Abdominal:      General: Bowel sounds are normal.   Musculoskeletal:      Cervical back: Neck supple.   Skin:     General: Skin is warm.   Neurological:      Mental Status: He is alert and oriented to person, place, and time.         All tests have been reviewed.    Assessment & Plan   Diagnoses and all orders for this  visit:    Primary hypertension add nadolol.  Decrease amlodipine to 5 mg daily due to edema.  Refer to nephrology  -     nadolol (Corgard) 40 MG tablet; Take 1 tablet by mouth Daily.  -     Ambulatory Referral to Nephrology  -     amLODIPine (NORVASC) 5 MG tablet; Take 1 tablet by mouth Daily.    Bilateral leg edema initiate water pill only as needed and the lower amlodipine to 5 mg  -     furosemide (Lasix) 20 MG tablet; Take 1 tablet by mouth Daily As Needed (leg swelling).  -     Ambulatory Referral to Nephrology    Stage 3 chronic kidney disease, unspecified whether stage 3a or 3b CKD (HCC)  -     Ambulatory Referral to Nephrology    1 mo

## 2023-04-10 ENCOUNTER — OFFICE VISIT (OUTPATIENT)
Dept: INTERNAL MEDICINE | Facility: CLINIC | Age: 58
End: 2023-04-10
Payer: COMMERCIAL

## 2023-04-10 VITALS
SYSTOLIC BLOOD PRESSURE: 120 MMHG | HEART RATE: 66 BPM | TEMPERATURE: 97.2 F | HEIGHT: 71 IN | WEIGHT: 216 LBS | BODY MASS INDEX: 30.24 KG/M2 | OXYGEN SATURATION: 96 % | DIASTOLIC BLOOD PRESSURE: 80 MMHG

## 2023-04-10 DIAGNOSIS — I10 PRIMARY HYPERTENSION: Primary | ICD-10-CM

## 2023-04-10 DIAGNOSIS — N18.30 STAGE 3 CHRONIC KIDNEY DISEASE, UNSPECIFIED WHETHER STAGE 3A OR 3B CKD: ICD-10-CM

## 2023-04-10 DIAGNOSIS — R60.0 BILATERAL LEG EDEMA: ICD-10-CM

## 2023-04-10 DIAGNOSIS — E78.2 MIXED HYPERLIPIDEMIA: ICD-10-CM

## 2023-04-10 NOTE — PROGRESS NOTES
Subjective   Tobias Newberry is a 57 y.o. male.     Chief Complaint   Patient presents with   • Follow-up   • Hypertension     Did not start nadolol as directed       History of Present Illness   Patient here for follow-up.  Blood pressure stable without taking nadolol.  Patient did have a vigorous diet low with the salt now blood pressure normal 120/80 lower extremity edema much improved.  Hyperlipidemia stable kidney disease the patient yet to see kidney doctor.    Current Outpatient Medications:   •  amLODIPine (NORVASC) 5 MG tablet, Take 1 tablet by mouth Daily., Disp: 30 tablet, Rfl: 1  •  furosemide (Lasix) 20 MG tablet, Take 1 tablet by mouth Daily As Needed (leg swelling)., Disp: 30 tablet, Rfl: 1  •  INV 1418 pembrolizumab 200 mg in sodium chloride 0.9 % 50 mL, Infuse 200 mg into a venous catheter Every 21 (Twenty-One) Days. (Patient not taking: Reported on 4/10/2023), Disp: , Rfl:     The following portions of the patient's history were reviewed and updated as appropriate: allergies, current medications, past family history, past medical history, past social history, past surgical history and problem list.    Review of Systems   Constitutional: Negative.    Respiratory: Negative.    Cardiovascular: Positive for leg swelling.   Gastrointestinal: Negative.    Skin: Negative.    Psychiatric/Behavioral: Negative.        Objective   Physical Exam  Constitutional:       Appearance: He is well-developed.   Cardiovascular:      Rate and Rhythm: Normal rate and regular rhythm.      Heart sounds: Normal heart sounds.   Pulmonary:      Effort: Pulmonary effort is normal.      Breath sounds: Normal breath sounds.   Abdominal:      General: Bowel sounds are normal.      Palpations: Abdomen is soft.   Musculoskeletal:      Cervical back: Neck supple.      Right lower leg: Edema present.      Left lower leg: Edema present.      Comments: trace   Neurological:      Mental Status: He is alert and oriented to person,  place, and time.   Psychiatric:         Behavior: Behavior normal.         All tests have been reviewed.    Assessment & Plan   Diagnoses and all orders for this visit:    Primary hypertension stable on amlodipine 5 mg daily continue    Mixed hyperlipidemia continue good diet    Bilateral leg edema continue cut down salt Lasix only as needed.  Leg edema now is trace in the ankle area.    Stage 3 chronic kidney disease, unspecified whether stage 3a or 3b CKD pending to see nephrologist

## 2023-05-09 ENCOUNTER — HOSPITAL ENCOUNTER (OUTPATIENT)
Dept: CT IMAGING | Facility: HOSPITAL | Age: 58
Discharge: HOME | End: 2023-05-09
Payer: COMMERCIAL

## 2023-05-09 DIAGNOSIS — C64.2 RENAL CELL CARCINOMA OF LEFT KIDNEY: ICD-10-CM

## 2023-05-09 PROCEDURE — 71250 CT THORAX DX C-: CPT

## 2023-05-09 PROCEDURE — 74176 CT ABD & PELVIS W/O CONTRAST: CPT

## 2023-05-22 ENCOUNTER — OFFICE VISIT (OUTPATIENT)
Dept: UROLOGY | Facility: CLINIC | Age: 58
End: 2023-05-22
Payer: COMMERCIAL

## 2023-05-22 VITALS
WEIGHT: 216 LBS | TEMPERATURE: 97.5 F | HEIGHT: 71 IN | HEART RATE: 89 BPM | SYSTOLIC BLOOD PRESSURE: 145 MMHG | BODY MASS INDEX: 30.24 KG/M2 | DIASTOLIC BLOOD PRESSURE: 90 MMHG | OXYGEN SATURATION: 99 %

## 2023-05-22 DIAGNOSIS — C64.2 RENAL CELL CARCINOMA OF LEFT KIDNEY: Primary | ICD-10-CM

## 2023-05-22 LAB
BILIRUB BLD-MCNC: NEGATIVE MG/DL
CLARITY, POC: CLEAR
COLOR UR: YELLOW
EXPIRATION DATE: NORMAL
GLUCOSE UR STRIP-MCNC: NEGATIVE MG/DL
KETONES UR QL: NEGATIVE
LEUKOCYTE EST, POC: NEGATIVE
Lab: NORMAL
NITRITE UR-MCNC: NEGATIVE MG/ML
PH UR: 6 [PH] (ref 5–8)
PROT UR STRIP-MCNC: NEGATIVE MG/DL
RBC # UR STRIP: NEGATIVE /UL
SP GR UR: 1.01 (ref 1–1.03)
UROBILINOGEN UR QL: NORMAL

## 2023-05-22 PROCEDURE — 99214 OFFICE O/P EST MOD 30 MIN: CPT | Performed by: UROLOGY

## 2023-05-22 PROCEDURE — 81003 URINALYSIS AUTO W/O SCOPE: CPT | Performed by: UROLOGY

## 2023-05-22 NOTE — LETTER
May 22, 2023     Nabeel Bullock MD  [address]  [facility] [address]    Patient: Tobias Newberry   YOB: 1965   Date of Visit: 5/22/2023     Dear Dr. Ara MD:    Thank you for referring Tobias Newberry to me for evaluation. Below are the relevant portions of my assessment and plan of care.    If you have questions, please do not hesitate to call me. I look forward to following Tobias along with you.         Sincerely,        Manjinder Obrien MD        CC: MD Lillian Judge MD      Progress Notes:  Chief Complaint   Patient presents with   • Renal Cell Carcinoma of Left Kidney     Follow up- 6 month      HPI  Mr. Newberry is a 57 y.o. male with history below in assessment, who presents for follow up.     At this visit patient denies any shortness of breath    Past Medical History:   Diagnosis Date   • Cancer     renal cell carcinoma:  on immunotherapy   • Hematuria    • History of transfusion 02/16/2022    pt states he had transfusion during surgery to remove kidney   • Hypertension    • Tattoo    • Tinnitus    • Wears glasses        Past Surgical History:   Procedure Laterality Date   • COLONOSCOPY N/A 11/4/2022    Procedure: COLONOSCOPY, BIOPSIES, POLYPECTOMY;  Surgeon: Rico Pizarro MD;  Location: [facility] ENDOSCOPY;  Service: Gastroenterology;  Laterality: N/A;   • NEPHRECTOMY Left 2/16/2022    Procedure: OPEN LEFT RADICAL NEPHRECTOMY WITH LEFT ADRENALECTOMY;  Surgeon: Manjinder Obrien MD;  Location: [facility] OR;  Service: Urology;  Laterality: Left;   • VASECTOMY           Current Outpatient Medications:   •  amLODIPine (NORVASC) 5 MG tablet, Take 1 tablet by mouth Daily., Disp: 30 tablet, Rfl: 1  •  furosemide (Lasix) 20 MG tablet, Take 1 tablet by mouth Daily As Needed (leg swelling)., Disp: 30 tablet, Rfl: 1     Physical Exam  Visit Vitals  /90 (BP Location: Left arm, Patient Position: Sitting, Cuff Size: Adult)   Pulse 89   Temp 97.5 °F (36.4  "°C) (Temporal)   Ht 180.3 cm (71\") Comment: patient stated   Wt 98 kg (216 lb)   SpO2 99%   BMI 30.13 kg/m²       Labs  Brief Urine Lab Results  (Last result in the past 365 days)        Color   Clarity   Blood   Leuk Est   Nitrite   Protein   CREAT   Urine HCG        05/22/23 1550 Yellow   Clear   Negative   Negative   Negative   Negative                   Lab Results   Component Value Date    GLUCOSE 120 (H) 03/06/2023    CALCIUM 9.4 03/06/2023     03/06/2023    K 4.7 03/06/2023    CO2 25.0 03/06/2023     03/06/2023    BUN 17 03/06/2023    CREATININE 1.51 (H) 03/06/2023    EGFRIFAFRI 50 (L) 02/24/2022    EGFRIFNONA 42 (L) 02/24/2022    BCR 11.3 03/06/2023    ANIONGAP 12.0 03/06/2023       Lab Results   Component Value Date    WBC 6.86 03/06/2023    HGB 13.9 03/06/2023    HCT 40.8 03/06/2023    MCV 85.7 03/06/2023     03/06/2023            Lab Results   Component Value Date    PSA 0.5 06/17/2022    PSA 0.705 08/14/2018           Radiographic Studies  CT Abdomen Pelvis Without Contrast  Result Date: 5/10/2023  Stable post left nephrectomy change with no evidence of metastatic disease this time.  Worsened bibasilar airspace disease as described.  This report was signed and finalized on 5/10/2023 7:16 AM by Zoë Fontaine MD.    CT Chest Without Contrast Diagnostic    Result Date: 5/10/2023  Interval worsening of bilateral ground-glass opacities with some demonstrating more nodular appearance, particularly an 11 mm nodule right lower lobe; findings concerning for metastatic disease. PET/CT may be helpful.      This report was signed and finalized on 5/10/2023 8:53 AM by Zoë Fontaine MD.    CT Chest Without Contrast Diagnostic  Result Date: 2/28/2023  Mild interval improvement in right upper lobe airspace disease suggesting infectious/inflammatory process.  Persistent remaining bilateral patchy airspace disease and noncalcified nodules which may represent metastatic disease; recommend continued " follow-up and/or PET CT..    This report was signed and finalized on 2/28/2023 7:25 AM by Zoë Fontaine MD.      I have reviewed above labs and imaging.     Assessment  57 y.o. male with pT3a clear cell renal cell carcinoma, grade 2 (stage III), status post open radical nephrectomy and adrenalectomy 2/16/2022.  Margins all negative.  He is receiving adjuvant pembrolizumab.  He is CT abdomen looks clean, but his CT chest is worrisome with the nodules getting larger for metastatic disease/cancer recurrence.     Plan  1. Fu with Dr. Pizano and Tawanda. May need bronch with biopsy.  I reached out to Dr. Neff personally to discuss his coordination of care.

## 2023-05-22 NOTE — PROGRESS NOTES
"Chief Complaint   Patient presents with   • Renal Cell Carcinoma of Left Kidney     Follow up- 6 month      HPI  Mr. Newberry is a 57 y.o. male with history below in assessment, who presents for follow up.     At this visit patient denies any shortness of breath    Past Medical History:   Diagnosis Date   • Cancer     renal cell carcinoma:  on immunotherapy   • Hematuria    • History of transfusion 02/16/2022    pt states he had transfusion during surgery to remove kidney   • Hypertension    • Tattoo    • Tinnitus    • Wears glasses        Past Surgical History:   Procedure Laterality Date   • COLONOSCOPY N/A 11/4/2022    Procedure: COLONOSCOPY, BIOPSIES, POLYPECTOMY;  Surgeon: Rico Pizarro MD;  Location: [facility] ENDOSCOPY;  Service: Gastroenterology;  Laterality: N/A;   • NEPHRECTOMY Left 2/16/2022    Procedure: OPEN LEFT RADICAL NEPHRECTOMY WITH LEFT ADRENALECTOMY;  Surgeon: Manjinder Obrien MD;  Location: [facility] OR;  Service: Urology;  Laterality: Left;   • VASECTOMY           Current Outpatient Medications:   •  amLODIPine (NORVASC) 5 MG tablet, Take 1 tablet by mouth Daily., Disp: 30 tablet, Rfl: 1  •  furosemide (Lasix) 20 MG tablet, Take 1 tablet by mouth Daily As Needed (leg swelling)., Disp: 30 tablet, Rfl: 1     Physical Exam  Visit Vitals  /90 (BP Location: Left arm, Patient Position: Sitting, Cuff Size: Adult)   Pulse 89   Temp 97.5 °F (36.4 °C) (Temporal)   Ht 180.3 cm (71\") Comment: patient stated   Wt 98 kg (216 lb)   SpO2 99%   BMI 30.13 kg/m²       Labs  Brief Urine Lab Results  (Last result in the past 365 days)      Color   Clarity   Blood   Leuk Est   Nitrite   Protein   CREAT   Urine HCG        05/22/23 1550 Yellow   Clear   Negative   Negative   Negative   Negative                 Lab Results   Component Value Date    GLUCOSE 120 (H) 03/06/2023    CALCIUM 9.4 03/06/2023     03/06/2023    K 4.7 03/06/2023    CO2 25.0 03/06/2023     03/06/2023    BUN 17 03/06/2023    " CREATININE 1.51 (H) 03/06/2023    EGFRIFAFRI 50 (L) 02/24/2022    EGFRIFNONA 42 (L) 02/24/2022    BCR 11.3 03/06/2023    ANIONGAP 12.0 03/06/2023       Lab Results   Component Value Date    WBC 6.86 03/06/2023    HGB 13.9 03/06/2023    HCT 40.8 03/06/2023    MCV 85.7 03/06/2023     03/06/2023            Lab Results   Component Value Date    PSA 0.5 06/17/2022    PSA 0.705 08/14/2018           Radiographic Studies  CT Abdomen Pelvis Without Contrast  Result Date: 5/10/2023  Stable post left nephrectomy change with no evidence of metastatic disease this time.  Worsened bibasilar airspace disease as described.  This report was signed and finalized on 5/10/2023 7:16 AM by Zoë Fontaine MD.    CT Chest Without Contrast Diagnostic    Result Date: 5/10/2023  Interval worsening of bilateral ground-glass opacities with some demonstrating more nodular appearance, particularly an 11 mm nodule right lower lobe; findings concerning for metastatic disease. PET/CT may be helpful.      This report was signed and finalized on 5/10/2023 8:53 AM by Zoë Fontaine MD.    CT Chest Without Contrast Diagnostic  Result Date: 2/28/2023  Mild interval improvement in right upper lobe airspace disease suggesting infectious/inflammatory process.  Persistent remaining bilateral patchy airspace disease and noncalcified nodules which may represent metastatic disease; recommend continued follow-up and/or PET CT..    This report was signed and finalized on 2/28/2023 7:25 AM by Zoë Fontaine MD.      I have reviewed above labs and imaging.     Assessment  57 y.o. male with pT3a clear cell renal cell carcinoma, grade 2 (stage III), status post open radical nephrectomy and adrenalectomy 2/16/2022.  Margins all negative.  He is receiving adjuvant pembrolizumab.  He is CT abdomen looks clean, but his CT chest is worrisome with the nodules getting larger for metastatic disease/cancer recurrence.     Plan  1. Fu with Dr. Pizano and Tawanda. May need bronch  with biopsy.  I reached out to Dr. Neff personally to discuss his coordination of care.

## 2023-06-06 NOTE — PROGRESS NOTES
DATE OF VISIT: 6/7/2023    REASON FOR VISIT: Followup for left kidney cancer     PROBLEM LIST:  1. 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  Presented with hematuria and back pain  B.  CT abdomen pelvis done January 16, 2022 revealed 6 cm left kidney mass  C.  Status post radical nephrectomy done by Dr. Obrien February 16, 2022  D.  Final pathology confirmed 6 cm, grade 2 clear cell carcinoma, invades renal vein, clear surgical margins, and no lymphovascular invasion.  E.  Completed 1 year of adjuvant immunotherapy from 3/18/2022 till March 2023.  2.  Hypertension  3.  Postoperative pain  4.  Right upper lobe lung nodule, 1 cm:  A.  Incidentally noted on CT chest done January 20, 2022  5.  Family history of kidney cancer  6. Immune-therapy induced pruritis  7. POT1 gene mutation    HISTORY OF PRESENT ILLNESS: The patient is a very pleasant 57 y.o. male  with past medical history significant for left kidney cancer diagnosed February 2022.  He is status post radical left nephrectomy.  He was started on adjuvant Keytruda March 18, 2022.  The patient is here today for scheduled follow-up visit.    SUBJECTIVE: Jefry is here today with his wife.  All in all he is doing fairly well.  He is complaining of shortness of breath.  He is anxious with the scan results.    Past History:  Medical History: has a past medical history of Cancer, Hematuria, History of transfusion (02/16/2022), Hypertension, Tattoo, Tinnitus, and Wears glasses.   Surgical History: has a past surgical history that includes Vasectomy; Nephrectomy (Left, 2/16/2022); and Colonoscopy (N/A, 11/4/2022).   Family History: family history includes Cancer in his brother and mother; Heart disease in his father and paternal uncle; Leukemia in his maternal aunt; Skin cancer in his maternal grandfather.   Social History: reports that he quit smoking about 7 years ago. His smoking use included cigarettes. He has a 6.00 pack-year smoking history. He quit  "smokeless tobacco use about 15 months ago.  His smokeless tobacco use included chew. He reports current alcohol use. He reports that he does not use drugs.    (Not in a hospital admission)     Allergies: Patient has no known allergies.     Review of Systems   Constitutional:  Positive for fatigue.   Gastrointestinal:  Positive for constipation.   Musculoskeletal:  Positive for arthralgias.     PHYSICAL EXAMINATION:   BP (!) 186/107   Pulse 88   Temp 98.4 °F (36.9 °C) (Infrared)   Resp 18   Ht 180.3 cm (70.98\")   Wt 98 kg (216 lb)   SpO2 99%   BMI 30.14 kg/m²    Pain Score    06/07/23 1534   PainSc: 0-No pain     ECOG score: 0        ECOG Performance Status: 1 - Symptomatic but completely ambulatory  General Appearance:  alert, cooperative, no apparent distress and appears stated age   Lungs:    Clear to auscultation bilaterally no wheezing no rhonchi   Heart:   Regular rate and rhythm S1-S2 no murmurs   Abdomen:    Soft nontender not distended bowel sounds are positive     No visits with results within 2 Week(s) from this visit.   Latest known visit with results is:   Office Visit on 05/22/2023   Component Date Value Ref Range Status    Color 05/22/2023 Yellow  Yellow, Straw, Dark Yellow, Emily Final    Clarity, UA 05/22/2023 Clear  Clear Final    Specific Gravity  05/22/2023 1.010  1.005 - 1.030 Final    pH, Urine 05/22/2023 6.0  5.0 - 8.0 Final    Leukocytes 05/22/2023 Negative  Negative Final    Nitrite, UA 05/22/2023 Negative  Negative Final    Protein, POC 05/22/2023 Negative  Negative mg/dL Final    Glucose, UA 05/22/2023 Negative  Negative mg/dL Final    Ketones, UA 05/22/2023 Negative  Negative Final    Urobilinogen, UA 05/22/2023 Normal  Normal, 0.2 E.U./dL Final    Bilirubin 05/22/2023 Negative  Negative Final    Blood, UA 05/22/2023 Negative  Negative Final    Lot Number 05/22/2023 [id number]   Final    Expiration Date 05/22/2023 07/14/2024   Final        CT Abdomen Pelvis Without " Contrast    Result Date: 5/10/2023  Narrative: PROCEDURE: CT ABDOMEN PELVIS WO CONTRAST-  HISTORY: Hx of RCC; C64.2-Malignant neoplasm of left kidney, except renal pelvis  COMPARISON: 11/23/2022.  PROCEDURE: Axial images were obtained from the lung bases to the pubic symphysis by computed tomography. This study was performed with techniques to keep radiation doses as low as reasonably achievable, (ALARA). Individualized dose reduction techniques using automated exposure control or adjustment of mA and/or kV according to the patient size were employed.  FINDINGS:  ABDOMEN: There is motion artifact on the images of the lung bases. There are bilateral groundglass opacities with a nodular appearance new from the prior exam, likely infectious but malignancy is not excluded. This represents a significant change from the prior exam and short-term follow-up recommended. The heart is proper size. The limited non-contrast images of the liver again demonstrates a mildly lobulated hypodense lesion left lobe of the liver most consistent with a cyst. This is stable from the prior exam. Gallbladder present with no CT visible stones. Left nephrectomy changes identified with no new nodularity density seen in the nephrectomy bed to indicate recurrent disease. Surgical clips noted. Air collection noted between the second portion of the duodenum in the pancreas consistent with a type particularly of the duodenum; this is stable from the prior exam. 2 small retrocrural lymph nodes are stable from the prior exam. The spleen is unremarkable. No adrenal masses are seen. The aorta is normal in caliber. There is no significant free fluid or adenopathy.  There is no nephrolithiasis. There is no hydronephrosis.  PELVIS: The GI tract demonstrate no obstruction. The appendix is identified and appears normal. Prostate is normal in size and contains small calcifications, stable from the prior exam. The urinary bladder is unremarkable. There is no  fluid or adenopathy.  No bony destructive lesion is seen.      Impression: Stable post left nephrectomy change with no evidence of metastatic disease this time.  Worsened bibasilar airspace disease as described.  This report was signed and finalized on 5/10/2023 7:16 AM by Zoë Fontaine MD.    CT Chest Without Contrast Diagnostic    Result Date: 5/10/2023  Narrative: PROCEDURE: CT CHEST WO CONTRAST DIAGNOSTIC-  HISTORY: Hx of RCC; C64.2-Malignant neoplasm of left kidney, except renal pelvis  COMPARISON: 02/27/2023  PROCEDURE: Axial images were obtained from the lung apex to the mid abdomen by computed tomography. This study was performed with techniques to keep radiation doses as low as reasonably achievable, (ALARA). Individualized dose reduction techniques using automated exposure control or adjustment of mA and/or kV according to the patient size were employed.  FINDINGS:  CHEST: There is no suspicious axillary adenopathy. There is no suspicious hilar or mediastinal adenopathy. The heart is proper size. There is no pericardial or pleural effusion.The previously described predominantly ground-glass opacities have worsened compared to the prior exam. There is one particularly in the right lower lobe laterally which has a more nodular appearance and now measures 11 mm. The ground-glass opacity still raise the possibility of an infectious/inflammatory process, but the nodular lesions are concerning for metastatic disease. PET/CT may be helpful. No bony destructive lesion is seen. Limited images of the upper abdomen demonstrate surgical clips in the left nephrectomy bed.      Impression: Interval worsening of bilateral ground-glass opacities with some demonstrating more nodular appearance, particularly an 11 mm nodule right lower lobe; findings concerning for metastatic disease. PET/CT may be helpful.      This report was signed and finalized on 5/10/2023 8:53 AM by Zoë Fontaine MD.       ASSESSMENT: The patient is a  very pleasant 57 y.o. male  with left kidney cancer      PLAN:    1.  Left kidney cancer:  A.  I did go over the scan results with the patient from May 9, 2023.  I reviewed the films myself.  He continues to have bilateral lung infiltrates with questionable lung nodules versus inflammation.  No convincing evidence of metastatic disease per  B.  I will continue watchful waiting.  The patient is not interested in another year of immunotherapy.  C.  He will follow-up with me in 3 months with repeated scans.    2.  Right upper lobe lung nodule:  A. The scattered bilateral pulmonary nodules are stable in size. We will continue to monitor these are follow up imaging.   B. He will follow up with Dr. Neff from pulmonary.     3.  Treatment induced nausea:  A.  He will continue use of Zofran as needed for treatment induced nausea.    4.  Hypertension:  A.  I will continue [id number] mg daily.   B. His dose was recently increased by his primary care provider secondary to persistent elevation in blood pressure. He will continue to monitor his blood pressure at home.     5. Immune-therapy pneumonitis:  A. I we will start the patient on prednisone 40 mg daily with 10 mg taper every week.      6. POT1 Gene mutation:  A. I recommended he have annual skin exam due to higher risk for melanoma.   B. We will continue surveillance CT scans for renal cell carcinoma.   C. We will consider MRI brain in the future if he develops neurologic symptoms.     7.  Elevated creatinine:  A.  I will clinically avoid contrast with his scans.    FOLLOW UP: 6 weeks with repeated scan.    Garrett Pizano MD  6/7/2023

## 2023-06-07 ENCOUNTER — OFFICE VISIT (OUTPATIENT)
Dept: ONCOLOGY | Facility: CLINIC | Age: 58
End: 2023-06-07
Payer: COMMERCIAL

## 2023-06-07 VITALS
BODY MASS INDEX: 30.24 KG/M2 | RESPIRATION RATE: 18 BRPM | SYSTOLIC BLOOD PRESSURE: 186 MMHG | OXYGEN SATURATION: 99 % | HEIGHT: 71 IN | HEART RATE: 88 BPM | WEIGHT: 216 LBS | DIASTOLIC BLOOD PRESSURE: 107 MMHG | TEMPERATURE: 98.4 F

## 2023-06-07 DIAGNOSIS — C64.2 CARCINOMA OF LEFT KIDNEY: Primary | ICD-10-CM

## 2023-06-07 PROCEDURE — 99214 OFFICE O/P EST MOD 30 MIN: CPT | Performed by: INTERNAL MEDICINE

## 2023-06-07 RX ORDER — PREDNISONE 10 MG/1
TABLET ORAL
Qty: 70 TABLET | Refills: 0 | Status: SHIPPED | OUTPATIENT
Start: 2023-06-07

## 2023-06-07 NOTE — LETTER
June 7, 2023     Lillian Neff MD  [address]  [address]  [facility] [address]    Patient: Tobias Newberry   YOB: 1965   Date of Visit: 6/7/2023       Dear Lillian Neff MD    Tobias Newberry was in my office today. Below is a copy of my note.    If you have questions, please do not hesitate to call me. I look forward to following Tobias along with you.         Sincerely,        Garrett Pizano MD        CC: No Recipients    DATE OF VISIT: 6/7/2023    REASON FOR VISIT: Followup for left kidney cancer     PROBLEM LIST:  1. 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  Presented with hematuria and back pain  B.  CT abdomen pelvis done January 16, 2022 revealed 6 cm left kidney mass  C.  Status post radical nephrectomy done by Dr. Obrien February 16, 2022  D.  Final pathology confirmed 6 cm, grade 2 clear cell carcinoma, invades renal vein, clear surgical margins, and no lymphovascular invasion.  E.  Completed 1 year of adjuvant immunotherapy from 3/18/2022 till March 2023.  2.  Hypertension  3.  Postoperative pain  4.  Right upper lobe lung nodule, 1 cm:  A.  Incidentally noted on CT chest done January 20, 2022  5.  Family history of kidney cancer  6. Immune-therapy induced pruritis  7. POT1 gene mutation    HISTORY OF PRESENT ILLNESS: The patient is a very pleasant 57 y.o. male  with past medical history significant for left kidney cancer diagnosed February 2022.  He is status post radical left nephrectomy.  He was started on adjuvant Keytruda March 18, 2022.  The patient is here today for scheduled follow-up visit.    SUBJECTIVE: Jefry is here today with his wife.  All in all he is doing fairly well.  He is complaining of shortness of breath.  He is anxious with the scan results.    Past History:  Medical History: has a past medical history of Cancer, Hematuria, History of transfusion (02/16/2022), Hypertension, Tattoo, Tinnitus, and Wears glasses.   Surgical History:  "has a past surgical history that includes Vasectomy; Nephrectomy (Left, 2/16/2022); and Colonoscopy (N/A, 11/4/2022).   Family History: family history includes Cancer in his brother and mother; Heart disease in his father and paternal uncle; Leukemia in his maternal aunt; Skin cancer in his maternal grandfather.   Social History: reports that he quit smoking about 7 years ago. His smoking use included cigarettes. He has a 6.00 pack-year smoking history. He quit smokeless tobacco use about 15 months ago.  His smokeless tobacco use included chew. He reports current alcohol use. He reports that he does not use drugs.    (Not in a hospital admission)     Allergies: Patient has no known allergies.     Review of Systems   Constitutional:  Positive for fatigue.   Gastrointestinal:  Positive for constipation.   Musculoskeletal:  Positive for arthralgias.     PHYSICAL EXAMINATION:   BP (!) 186/107   Pulse 88   Temp 98.4 °F (36.9 °C) (Infrared)   Resp 18   Ht 180.3 cm (70.98\")   Wt 98 kg (216 lb)   SpO2 99%   BMI 30.14 kg/m²    Pain Score    06/07/23 1534   PainSc: 0-No pain     ECOG score: 0        ECOG Performance Status: 1 - Symptomatic but completely ambulatory  General Appearance:  alert, cooperative, no apparent distress and appears stated age   Lungs:    Clear to auscultation bilaterally no wheezing no rhonchi   Heart:   Regular rate and rhythm S1-S2 no murmurs   Abdomen:    Soft nontender not distended bowel sounds are positive     No visits with results within 2 Week(s) from this visit.   Latest known visit with results is:   Office Visit on 05/22/2023   Component Date Value Ref Range Status   • Color 05/22/2023 Yellow  Yellow, Straw, Dark Yellow, Emily Final   • Clarity, UA 05/22/2023 Clear  Clear Final   • Specific Gravity  05/22/2023 1.010  1.005 - 1.030 Final   • pH, Urine 05/22/2023 6.0  5.0 - 8.0 Final   • Leukocytes 05/22/2023 Negative  Negative Final   • Nitrite, UA 05/22/2023 Negative  Negative Final "   • Protein, POC 05/22/2023 Negative  Negative mg/dL Final   • Glucose, UA 05/22/2023 Negative  Negative mg/dL Final   • Ketones, UA 05/22/2023 Negative  Negative Final   • Urobilinogen, UA 05/22/2023 Normal  Normal, 0.2 E.U./dL Final   • Bilirubin 05/22/2023 Negative  Negative Final   • Blood, UA 05/22/2023 Negative  Negative Final   • Lot Number 05/22/2023 [id number]   Final   • Expiration Date 05/22/2023 07/14/2024   Final        CT Abdomen Pelvis Without Contrast    Result Date: 5/10/2023  Narrative: PROCEDURE: CT ABDOMEN PELVIS WO CONTRAST-  HISTORY: Hx of RCC; C64.2-Malignant neoplasm of left kidney, except renal pelvis  COMPARISON: 11/23/2022.  PROCEDURE: Axial images were obtained from the lung bases to the pubic symphysis by computed tomography. This study was performed with techniques to keep radiation doses as low as reasonably achievable, (ALARA). Individualized dose reduction techniques using automated exposure control or adjustment of mA and/or kV according to the patient size were employed.  FINDINGS:  ABDOMEN: There is motion artifact on the images of the lung bases. There are bilateral groundglass opacities with a nodular appearance new from the prior exam, likely infectious but malignancy is not excluded. This represents a significant change from the prior exam and short-term follow-up recommended. The heart is proper size. The limited non-contrast images of the liver again demonstrates a mildly lobulated hypodense lesion left lobe of the liver most consistent with a cyst. This is stable from the prior exam. Gallbladder present with no CT visible stones. Left nephrectomy changes identified with no new nodularity density seen in the nephrectomy bed to indicate recurrent disease. Surgical clips noted. Air collection noted between the second portion of the duodenum in the pancreas consistent with a type particularly of the duodenum; this is stable from the prior exam. 2 small retrocrural lymph  nodes are stable from the prior exam. The spleen is unremarkable. No adrenal masses are seen. The aorta is normal in caliber. There is no significant free fluid or adenopathy.  There is no nephrolithiasis. There is no hydronephrosis.  PELVIS: The GI tract demonstrate no obstruction. The appendix is identified and appears normal. Prostate is normal in size and contains small calcifications, stable from the prior exam. The urinary bladder is unremarkable. There is no fluid or adenopathy.  No bony destructive lesion is seen.      Impression: Stable post left nephrectomy change with no evidence of metastatic disease this time.  Worsened bibasilar airspace disease as described.  This report was signed and finalized on 5/10/2023 7:16 AM by Zoë Fontaine MD.    CT Chest Without Contrast Diagnostic    Result Date: 5/10/2023  Narrative: PROCEDURE: CT CHEST WO CONTRAST DIAGNOSTIC-  HISTORY: Hx of RCC; C64.2-Malignant neoplasm of left kidney, except renal pelvis  COMPARISON: 02/27/2023  PROCEDURE: Axial images were obtained from the lung apex to the mid abdomen by computed tomography. This study was performed with techniques to keep radiation doses as low as reasonably achievable, (ALARA). Individualized dose reduction techniques using automated exposure control or adjustment of mA and/or kV according to the patient size were employed.  FINDINGS:  CHEST: There is no suspicious axillary adenopathy. There is no suspicious hilar or mediastinal adenopathy. The heart is proper size. There is no pericardial or pleural effusion.The previously described predominantly ground-glass opacities have worsened compared to the prior exam. There is one particularly in the right lower lobe laterally which has a more nodular appearance and now measures 11 mm. The ground-glass opacity still raise the possibility of an infectious/inflammatory process, but the nodular lesions are concerning for metastatic disease. PET/CT may be helpful. No bony  destructive lesion is seen. Limited images of the upper abdomen demonstrate surgical clips in the left nephrectomy bed.      Impression: Interval worsening of bilateral ground-glass opacities with some demonstrating more nodular appearance, particularly an 11 mm nodule right lower lobe; findings concerning for metastatic disease. PET/CT may be helpful.      This report was signed and finalized on 5/10/2023 8:53 AM by Zoë Fontaine MD.       ASSESSMENT: The patient is a very pleasant 57 y.o. male  with left kidney cancer      PLAN:    1.  Left kidney cancer:  A.  I did go over the scan results with the patient from May 9, 2023.  I reviewed the films myself.  He continues to have bilateral lung infiltrates with questionable lung nodules versus inflammation.  No convincing evidence of metastatic disease per  B.  I will continue watchful waiting.  The patient is not interested in another year of immunotherapy.  C.  He will follow-up with me in 3 months with repeated scans.    2.  Right upper lobe lung nodule:  A. The scattered bilateral pulmonary nodules are stable in size. We will continue to monitor these are follow up imaging.   B. He will follow up with Dr. Neff from pulmonary.     3.  Treatment induced nausea:  A.  He will continue use of Zofran as needed for treatment induced nausea.    4.  Hypertension:  A.  I will continue [id number] mg daily.   B. His dose was recently increased by his primary care provider secondary to persistent elevation in blood pressure. He will continue to monitor his blood pressure at home.     5. Immune-therapy pneumonitis:  A. I we will start the patient on prednisone 40 mg daily with 10 mg taper every week.      6. POT1 Gene mutation:  A. I recommended he have annual skin exam due to higher risk for melanoma.   B. We will continue surveillance CT scans for renal cell carcinoma.   C. We will consider MRI brain in the future if he develops neurologic symptoms.     7.  Elevated  creatinine:  A.  I will clinically avoid contrast with his scans.    FOLLOW UP: 6 weeks with repeated scan.    Garrett Pizano MD  6/7/2023

## 2023-06-19 ENCOUNTER — OFFICE VISIT (OUTPATIENT)
Dept: PULMONOLOGY | Facility: CLINIC | Age: 58
End: 2023-06-19
Payer: COMMERCIAL

## 2023-06-19 VITALS
RESPIRATION RATE: 18 BRPM | DIASTOLIC BLOOD PRESSURE: 80 MMHG | SYSTOLIC BLOOD PRESSURE: 136 MMHG | HEART RATE: 96 BPM | OXYGEN SATURATION: 98 % | BODY MASS INDEX: 30.83 KG/M2 | WEIGHT: 220.2 LBS | HEIGHT: 71 IN

## 2023-06-19 DIAGNOSIS — R06.02 SHORTNESS OF BREATH: Primary | ICD-10-CM

## 2023-06-19 DIAGNOSIS — J45.30 MILD PERSISTENT ASTHMA WITHOUT COMPLICATION: ICD-10-CM

## 2023-06-19 DIAGNOSIS — R91.8 GROUND GLASS OPACITY PRESENT ON IMAGING OF LUNG: ICD-10-CM

## 2023-06-19 DIAGNOSIS — R93.89 ABNORMAL CT OF THE CHEST: Primary | ICD-10-CM

## 2023-06-19 DIAGNOSIS — R91.8 LUNG NODULE, MULTIPLE: ICD-10-CM

## 2023-06-19 PROCEDURE — 94060 EVALUATION OF WHEEZING: CPT | Performed by: INTERNAL MEDICINE

## 2023-06-19 PROCEDURE — 99214 OFFICE O/P EST MOD 30 MIN: CPT | Performed by: INTERNAL MEDICINE

## 2023-06-19 PROCEDURE — 95012 NITRIC OXIDE EXP GAS DETER: CPT | Performed by: INTERNAL MEDICINE

## 2023-06-19 RX ORDER — ALBUTEROL SULFATE 90 UG/1
2 AEROSOL, METERED RESPIRATORY (INHALATION) EVERY 4 HOURS PRN
Qty: 18 G | Refills: 5 | Status: SHIPPED | OUTPATIENT
Start: 2023-06-19

## 2023-06-19 NOTE — PROGRESS NOTES
"  Chief Complaint   Patient presents with    Abnormal Chest X-ray    Follow-up       Subjective   Tobias Newberry is a 57 y.o. male.   Patient was evaluated today to discuss the results of CT scan.    The patient denies any night sweats, weight loss or hemoptysis.     He has been seen by Dr. Pizano and was started on steroids.     Does wheeze at night, when he lays down.     The following portions of the patient's history were reviewed and updated as appropriate: allergies, current medications, past family history, past medical history, past social history, and past surgical history.    Review of Systems   HENT:  Negative for sinus pressure, sneezing and sore throat.    Respiratory:  Positive for wheezing. Negative for cough, chest tightness and shortness of breath.    Cardiovascular:  Positive for palpitations. Negative for leg swelling.     Objective   Visit Vitals  /80   Pulse 96   Resp 18   Ht 180.3 cm (70.98\")   Wt 99.9 kg (220 lb 3.2 oz)   SpO2 98%   BMI 30.73 kg/m²       Physical Exam  Vitals reviewed.   Constitutional:       Appearance: He is well-developed.   HENT:      Head:      Comments: No acute lesions noted     Mouth/Throat:      Mouth: Mucous membranes are moist.   Neck:      Thyroid: No thyromegaly.      Vascular: No JVD.   Cardiovascular:      Rate and Rhythm: Normal rate and regular rhythm.      Heart sounds: No murmur heard.  Pulmonary:      Effort: Pulmonary effort is normal. No respiratory distress.      Breath sounds: No wheezing or rales.   Musculoskeletal:      Cervical back: Neck supple.      Comments: Gait was normal.   Skin:     General: Skin is warm and dry.   Neurological:      Mental Status: He is alert and oriented to person, place, and time.   Psychiatric:         Behavior: Behavior normal.       Assessment & Plan   Diagnoses and all orders for this visit:    1. Abnormal CT of the chest (Primary)    2. Lung nodule, multiple    3. Mild persistent asthma without " complication  -     Spirometry With Bronchodilator  -     Nitric Oxide    4. Ground glass opacity present on imaging of lung    Other orders  -     albuterol sulfate HFA (Ventolin HFA) 108 (90 Base) MCG/ACT inhaler; Inhale 2 puffs Every 4 (Four) Hours As Needed for Wheezing or Shortness of Air.  Dispense: 18 g; Refill: 5  -     Beclomethasone Diprop HFA (QVAR Redihaler) 80 MCG/ACT inhaler; Inhale 2 puffs 2 (Two) Times a Day. Rinse mouth with water after use.  Dispense: 1 each; Refill: 5           Return in about 6 months (around 12/19/2023) for Recheck.    DISCUSSION (if any):  Last CT scan results was reviewed in great detail with the patient.  Results for orders placed during the hospital encounter of 05/09/23    CT Chest Without Contrast Diagnostic    Narrative  PROCEDURE: CT CHEST WO CONTRAST DIAGNOSTIC-    HISTORY: Hx of RCC; C64.2-Malignant neoplasm of left kidney, except  renal pelvis    COMPARISON: 02/27/2023    PROCEDURE: Axial images were obtained from the lung apex to the mid  abdomen by computed tomography. This study was performed with techniques  to keep radiation doses as low as reasonably achievable, (ALARA).  Individualized dose reduction techniques using automated exposure  control or adjustment of mA and/or kV according to the patient size were  employed.    FINDINGS:    CHEST: There is no suspicious axillary adenopathy. There is no  suspicious hilar or mediastinal adenopathy. The heart is proper size.  There is no pericardial or pleural effusion.The previously described  predominantly ground-glass opacities have worsened compared to the prior  exam. There is one particularly in the right lower lobe laterally which  has a more nodular appearance and now measures 11 mm. The ground-glass  opacity still raise the possibility of an infectious/inflammatory  process, but the nodular lesions are concerning for metastatic disease.  PET/CT may be helpful. No bony destructive lesion is seen. Limited  images  of the upper abdomen demonstrate surgical clips in the left  nephrectomy bed.    Impression  Interval worsening of bilateral ground-glass opacities with  some demonstrating more nodular appearance, particularly an 11 mm nodule right lower lobe; findings concerning for metastatic disease. PET/CT may be helpful.        This report was signed and finalized on 5/10/2023 8:53 AM by Zoë Fontaine MD.      Based on the history obtained today, and based on the latest guidelines, the patient will need a repeat CT chest in few months, after the last CT. This has been arranged by Dr. Pizano.     I told the patient that abnormal finding on the CT could represent mild pneumonitis, which is a known side effect of immunotherapy.      He was asked to continue prednisone, as already ordered by Dr. Pizano.    I told the patient that occasionally, long-term prednisone is needed.    This will need to be decided based on clinical and radiological data.    If the CT scan shows worsening or development of adenopathy, then he may need to be considered for bronchoscopy.    FeNO level was 57 today.    Spirometry was reviewed.  Moderate obstruction noted.    I had a detailed discussion with the patient regarding his symptoms that are very suggestive of asthma    Orders as above.    The patient was started on Qvar with instructions to rinse his mouth with water after use.     The patient may be started on Singulair    Other contributing factors may also need to be treated and this will be discussed with the patient, if and when applicable    Patient was advised to use rescue inhaler for when necessary purposes    Patient was also advised to keep a log of the use of rescue inhaler.    Patient was given reading material.        Dictated utilizing Dragon dictation.    This document was electronically signed by Lillian Neff MD on 06/19/23 at 10:31 EDT

## 2023-06-19 NOTE — LETTER
"June 19, 2023       No Recipients    Patient: Tobias Newberry   YOB: 1965   Date of Visit: 6/19/2023     Dear Dr. Gamble Recipients:    Thank you for referring Tobias Newberry to me for evaluation. Below are the relevant portions of my assessment and plan of care.    If you have questions, please do not hesitate to call me. I look forward to following Tobias along with you.         Sincerely,        Lillian Neff MD        CC:   No Recipients      Progress Notes:    Chief Complaint   Patient presents with   • Abnormal Chest X-ray   • Follow-up       Subjective  Tobias Newberry is a 57 y.o. male.   Patient was evaluated today to discuss the results of CT scan.    The patient denies any night sweats, weight loss or hemoptysis.     He has been seen by Dr. Pizano and was started on steroids.     Does wheeze at night, when he lays down.     The following portions of the patient's history were reviewed and updated as appropriate: allergies, current medications, past family history, past medical history, past social history, and past surgical history.    Review of Systems   HENT:  Negative for sinus pressure, sneezing and sore throat.    Respiratory:  Positive for wheezing. Negative for cough, chest tightness and shortness of breath.    Cardiovascular:  Positive for palpitations. Negative for leg swelling.     Objective  Visit Vitals  /80   Pulse 96   Resp 18   Ht 180.3 cm (70.98\")   Wt 99.9 kg (220 lb 3.2 oz)   SpO2 98%   BMI 30.73 kg/m²       Physical Exam  Vitals reviewed.   Constitutional:       Appearance: He is well-developed.   HENT:      Head:      Comments: No acute lesions noted     Mouth/Throat:      Mouth: Mucous membranes are moist.   Neck:      Thyroid: No thyromegaly.      Vascular: No JVD.   Cardiovascular:      Rate and Rhythm: Normal rate and regular rhythm.      Heart sounds: No murmur heard.  Pulmonary:      Effort: Pulmonary effort is normal. No respiratory distress.    "   Breath sounds: No wheezing or rales.   Musculoskeletal:      Cervical back: Neck supple.      Comments: Gait was normal.   Skin:     General: Skin is warm and dry.   Neurological:      Mental Status: He is alert and oriented to person, place, and time.   Psychiatric:         Behavior: Behavior normal.       Assessment & Plan  Diagnoses and all orders for this visit:    1. Abnormal CT of the chest (Primary)    2. Lung nodule, multiple    3. Mild persistent asthma without complication  -     Spirometry With Bronchodilator  -     Nitric Oxide    4. Ground glass opacity present on imaging of lung    Other orders  -     albuterol sulfate HFA (Ventolin HFA) 108 (90 Base) MCG/ACT inhaler; Inhale 2 puffs Every 4 (Four) Hours As Needed for Wheezing or Shortness of Air.  Dispense: 18 g; Refill: 5  -     Beclomethasone Diprop HFA (QVAR Redihaler) 80 MCG/ACT inhaler; Inhale 2 puffs 2 (Two) Times a Day. Rinse mouth with water after use.  Dispense: 1 each; Refill: 5           Return in about 6 months (around 12/19/2023) for Recheck.    DISCUSSION (if any):  Last CT scan results was reviewed in great detail with the patient.  Results for orders placed during the hospital encounter of 05/09/23    CT Chest Without Contrast Diagnostic    Narrative  PROCEDURE: CT CHEST WO CONTRAST DIAGNOSTIC-    HISTORY: Hx of RCC; C64.2-Malignant neoplasm of left kidney, except  renal pelvis    COMPARISON: 02/27/2023    PROCEDURE: Axial images were obtained from the lung apex to the mid  abdomen by computed tomography. This study was performed with techniques  to keep radiation doses as low as reasonably achievable, (ALARA).  Individualized dose reduction techniques using automated exposure  control or adjustment of mA and/or kV according to the patient size were  employed.    FINDINGS:    CHEST: There is no suspicious axillary adenopathy. There is no  suspicious hilar or mediastinal adenopathy. The heart is proper size.  There is no pericardial or  pleural effusion.The previously described  predominantly ground-glass opacities have worsened compared to the prior  exam. There is one particularly in the right lower lobe laterally which  has a more nodular appearance and now measures 11 mm. The ground-glass  opacity still raise the possibility of an infectious/inflammatory  process, but the nodular lesions are concerning for metastatic disease.  PET/CT may be helpful. No bony destructive lesion is seen. Limited  images of the upper abdomen demonstrate surgical clips in the left  nephrectomy bed.    Impression  Interval worsening of bilateral ground-glass opacities with  some demonstrating more nodular appearance, particularly an 11 mm nodule right lower lobe; findings concerning for metastatic disease. PET/CT may be helpful.        This report was signed and finalized on 5/10/2023 8:53 AM by Zoë Fontaine MD.      Based on the history obtained today, and based on the latest guidelines, the patient will need a repeat CT chest in few months, after the last CT. This has been arranged by Dr. Pizano.     I told the patient that abnormal finding on the CT could represent mild pneumonitis, which is a known side effect of immunotherapy.      He was asked to continue prednisone, as already ordered by Dr. Pizano.    I told the patient that occasionally, long-term prednisone is needed.    This will need to be decided based on clinical and radiological data.    If the CT scan shows worsening or development of adenopathy, then he may need to be considered for bronchoscopy.    FeNO level was 57 today.    Spirometry was reviewed.  Moderate obstruction noted.    I had a detailed discussion with the patient regarding his symptoms that are very suggestive of asthma    Orders as above.    The patient was started on Qvar with instructions to rinse his mouth with water after use.     The patient may be started on Singulair    Other contributing factors may also need to be treated and  this will be discussed with the patient, if and when applicable    Patient was advised to use rescue inhaler for when necessary purposes    Patient was also advised to keep a log of the use of rescue inhaler.    Patient was given reading material.        Dictated utilizing Dragon dictation.    This document was electronically signed by Lillian Neff MD on 06/19/23 at 10:31 EDT

## 2023-07-10 PROBLEM — R91.8 LUNG INFILTRATE ON CT: Status: ACTIVE | Noted: 2023-07-10

## 2023-08-02 ENCOUNTER — HOSPITAL ENCOUNTER (OUTPATIENT)
Dept: CT IMAGING | Facility: HOSPITAL | Age: 58
Discharge: HOME | End: 2023-08-02
Admitting: INTERNAL MEDICINE
Payer: COMMERCIAL

## 2023-08-02 DIAGNOSIS — C64.2 CARCINOMA OF LEFT KIDNEY: ICD-10-CM

## 2023-08-02 PROCEDURE — 71250 CT THORAX DX C-: CPT

## 2023-10-11 ENCOUNTER — OFFICE VISIT (OUTPATIENT)
Dept: INTERNAL MEDICINE | Facility: CLINIC | Age: 58
End: 2023-10-11
Payer: COMMERCIAL

## 2023-10-11 VITALS
RESPIRATION RATE: 14 BRPM | DIASTOLIC BLOOD PRESSURE: 94 MMHG | SYSTOLIC BLOOD PRESSURE: 138 MMHG | HEART RATE: 80 BPM | HEIGHT: 71 IN | BODY MASS INDEX: 30.8 KG/M2 | OXYGEN SATURATION: 98 % | WEIGHT: 220 LBS

## 2023-10-11 DIAGNOSIS — N18.30 STAGE 3 CHRONIC KIDNEY DISEASE, UNSPECIFIED WHETHER STAGE 3A OR 3B CKD: ICD-10-CM

## 2023-10-11 DIAGNOSIS — M10.9 ACUTE GOUT OF RIGHT ANKLE, UNSPECIFIED CAUSE: ICD-10-CM

## 2023-10-11 DIAGNOSIS — M25.571 ARTHRALGIA OF RIGHT ANKLE: Primary | ICD-10-CM

## 2023-10-11 LAB
ALBUMIN SERPL-MCNC: 4.4 G/DL (ref 3.5–5.2)
ALBUMIN/GLOB SERPL: 1.7 G/DL
ALP SERPL-CCNC: 77 U/L (ref 39–117)
ALT SERPL-CCNC: 14 U/L (ref 1–41)
AST SERPL-CCNC: 15 U/L (ref 1–40)
BILIRUB SERPL-MCNC: 0.6 MG/DL (ref 0–1.2)
BUN SERPL-MCNC: 13 MG/DL (ref 6–20)
BUN/CREAT SERPL: 9.2 (ref 7–25)
CALCIUM SERPL-MCNC: 9.9 MG/DL (ref 8.6–10.5)
CHLORIDE SERPL-SCNC: 101 MMOL/L (ref 98–107)
CO2 SERPL-SCNC: 24.6 MMOL/L (ref 22–29)
CREAT SERPL-MCNC: 1.42 MG/DL (ref 0.76–1.27)
EGFRCR SERPLBLD CKD-EPI 2021: 57.3 ML/MIN/1.73
ERYTHROCYTE [DISTWIDTH] IN BLOOD BY AUTOMATED COUNT: 13.3 % (ref 12.3–15.4)
GLOBULIN SER CALC-MCNC: 2.6 GM/DL
GLUCOSE SERPL-MCNC: 116 MG/DL (ref 65–99)
HCT VFR BLD AUTO: 42.1 % (ref 37.5–51)
HGB BLD-MCNC: 14.7 G/DL (ref 13–17.7)
MCH RBC QN AUTO: 30.1 PG (ref 26.6–33)
MCHC RBC AUTO-ENTMCNC: 34.9 G/DL (ref 31.5–35.7)
MCV RBC AUTO: 86.3 FL (ref 79–97)
PLATELET # BLD AUTO: 280 10*3/MM3 (ref 140–450)
POTASSIUM SERPL-SCNC: 4.3 MMOL/L (ref 3.5–5.2)
PROT SERPL-MCNC: 7 G/DL (ref 6–8.5)
RBC # BLD AUTO: 4.88 10*6/MM3 (ref 4.14–5.8)
SODIUM SERPL-SCNC: 137 MMOL/L (ref 136–145)
URATE SERPL-MCNC: 6.9 MG/DL (ref 3.4–7)
WBC # BLD AUTO: 8.88 10*3/MM3 (ref 3.4–10.8)

## 2023-10-11 RX ORDER — PREDNISONE 20 MG/1
TABLET ORAL
Qty: 15 TABLET | Refills: 0 | Status: SHIPPED | OUTPATIENT
Start: 2023-10-11

## 2023-10-11 NOTE — PROGRESS NOTES
Office Note     Name: Tobias Newberry    : 1965     MRN: [id number]     Chief Complaint  Gout (Right foot. Started )    Subjective     History of Present Illness:  Tobias Newberry is a 58 y.o. male who presents today for right foot pain and swelling.  This started about 10 days ago and noted right big toe pain going on to the right ankle and now arch of his foot.  This has happened before alternating between right and left foot.  However, he knows that this is the worst that it has gotten.  No known trauma or fall.  No fever or chills.  He notes that his diet is heavy and steak, beef, red meat such as deer meat.  He does have a history of chronic kidney disease stage III.      Family History:   Family History   Problem Relation Age of Onset    Cancer Mother     Heart disease Father     Cancer Brother     Leukemia Maternal Aunt     Heart disease Paternal Uncle     Skin cancer Maternal Grandfather        Social History:   Social History     Socioeconomic History    Marital status:    Tobacco Use    Smoking status: Former     Packs/day: 1.50     Years: 4.00     Additional pack years: 0.00     Total pack years: 6.00     Types: Cigarettes     Quit date: 1/10/2016     Years since quittin.7    Smokeless tobacco: Former     Types: Chew     Quit date: 3/8/2022   Vaping Use    Vaping Use: Former    Substances: Nicotine    Devices: Refillable tank   Substance and Sexual Activity    Alcohol use: Yes     Comment: SOCIAL     Drug use: No    Sexual activity: Defer     Partners: Female       Health Maintenance   Topic Date Due    BMI FOLLOWUP  Never done    COVID-19 Vaccine (3 2023- season) 10/13/2023 (Originally 2023)    INFLUENZA VACCINE  2024 (Originally 2023)    Pneumococcal Vaccine 0-64 (1 - PCV) 07/10/2024 (Originally 1971)    TDAP/TD VACCINES (1 - Tdap) 10/11/2024 (Originally 1984)    ZOSTER VACCINE (1 of 2) 2025 (Originally  "8/26/2015)    ANNUAL PHYSICAL  07/10/2024    LIPID PANEL  07/14/2024    COLORECTAL CANCER SCREENING  11/04/2032    HEPATITIS C SCREENING  Completed       Objective     Vital Signs  /94   Pulse 80   Resp 14   Ht 180.3 cm (70.98\")   Wt 99.8 kg (220 lb)   SpO2 98%   BMI 30.70 kg/mý   Estimated body mass index is 30.7 kg/mý as calculated from the following:    Height as of this encounter: 180.3 cm (70.98\").    Weight as of this encounter: 99.8 kg (220 lb).    BMI is >= 30 and <35. (Class 1 Obesity). The following options were offered after discussion;: exercise counseling/recommendations and nutrition counseling/recommendations    Physical Exam  Vitals reviewed.   Musculoskeletal:      Comments: Right ankle: Swelling, redness, tenderness up to the base of the big toe.   Neurological:      Mental Status: He is alert.       Procedures     Assessment and Plan     Diagnoses and all orders for this visit:    1. Arthralgia of right ankle (Primary)  -     CBC (No Diff)  -     Comprehensive Metabolic Panel  -     Uric Acid    2. Acute gout of right ankle, unspecified cause  Assessment & Plan:  Initial visit prior.  Referred to Ortho for arthrocentesis.  From history and clinical physical exam, this is most likely gout, recurrent given that pain and swelling starts on the big toe and goes up to the ankle.  This was not his first time to have this flareup, and had episodes on the left foot.  Given his chronic kidney disease, will start him on prednisone 40 mg for 5 days, 20 mg for 3 days, 10 mg for the 3 days and 5 mg for 2 days.  Will obtain labs.  Once patient is not on acute flareup, follow-up in 4 weeks and consider starting febuxostat.    Orders:  -     Ambulatory Referral to Orthopedic Surgery    3. Stage 3 chronic kidney disease, unspecified whether stage 3a or 3b CKD  Assessment & Plan:  Renal condition is unchanged.      Other orders  -     predniSONE (DELTASONE) 20 MG tablet; Take 40mg for 5 days then 20 " mg for 3 days then 10mg for 3 days and 5mg for 2 days  Dispense: 15 tablet; Refill: 0         Counseling was given to patient and family for the following topics: instructions for management and importance of treatment compliance.    Follow Up  Return in about 4 weeks (around 11/8/2023) for Recheck.    MD JAMAL Benavidez [facility]  [address] [place] [id number] [phone]

## 2023-10-11 NOTE — ASSESSMENT & PLAN NOTE
Initial visit prior.  Referred to Ortho for arthrocentesis.  From history and clinical physical exam, this is most likely gout, recurrent given that pain and swelling starts on the big toe and goes up to the ankle.  This was not his first time to have this flareup, and had episodes on the left foot.  Given his chronic kidney disease, will start him on prednisone 40 mg for 5 days, 20 mg for 3 days, 10 mg for the 3 days and 5 mg for 2 days.  Will obtain labs.  Once patient is not on acute flareup, follow-up in 4 weeks and consider starting febuxostat.

## 2023-10-31 ENCOUNTER — TELEPHONE (OUTPATIENT)
Dept: INTERNAL MEDICINE | Facility: CLINIC | Age: 58
End: 2023-10-31

## 2023-10-31 NOTE — TELEPHONE ENCOUNTER
"Caller: Tobias Newberry \"Jefry\"    Relationship: Self    Best call back number: [phone]    What test was performed: LABS    When was the test performed:  10/11/23        Additional notes:     PATIENT CALLED AND STATED  MONIKA HAS CALLED HIM THREE TIMES ABOUT LABS.  PATIENT SAW THEM ON MYCHART AND SAYS THEY LOOK OK    "

## 2023-11-13 ENCOUNTER — OFFICE VISIT (OUTPATIENT)
Dept: INTERNAL MEDICINE | Facility: CLINIC | Age: 58
End: 2023-11-13
Payer: COMMERCIAL

## 2023-11-13 VITALS
BODY MASS INDEX: 30.8 KG/M2 | OXYGEN SATURATION: 97 % | SYSTOLIC BLOOD PRESSURE: 168 MMHG | DIASTOLIC BLOOD PRESSURE: 96 MMHG | HEIGHT: 71 IN | RESPIRATION RATE: 16 BRPM | WEIGHT: 220 LBS | HEART RATE: 88 BPM

## 2023-11-13 DIAGNOSIS — M10.071 ACUTE IDIOPATHIC GOUT OF RIGHT ANKLE: Primary | ICD-10-CM

## 2023-11-13 PROBLEM — Z12.11 ENCOUNTER FOR SCREENING FOR MALIGNANT NEOPLASM OF COLON: Status: RESOLVED | Noted: 2022-10-21 | Resolved: 2023-11-13

## 2023-11-13 PROCEDURE — 99213 OFFICE O/P EST LOW 20 MIN: CPT | Performed by: STUDENT IN AN ORGANIZED HEALTH CARE EDUCATION/TRAINING PROGRAM

## 2023-11-13 RX ORDER — FUROSEMIDE 20 MG/1
20 TABLET ORAL DAILY
COMMUNITY
Start: 2023-10-27

## 2023-11-13 NOTE — ASSESSMENT & PLAN NOTE
Was referred to Ortho for arthrocentesis, patient has not done this.  I did advise patient to follow with Ortho for arthrocentesis.  Completely resolved with prednisone taper given patient's history of CKD.  We will continue to monitor uric acid level every 6 months.  Last was 6.9.  Goal is less than 7.  If with any frequent or recurrent gout flare, may need urate lowering agent such as febuxostat.  Will defer for now given that this is only his initial episode.

## 2023-11-13 NOTE — PROGRESS NOTES
Office Note     Name: Tobias Newberry    : 1965     MRN: [id number]     Chief Complaint  Follow-up (Gout)    Subjective     History of Present Illness:  Tobias Newberry is a 58 y.o. male who presents today for follow-up on right foot pain.  He noted that after 1 dose of prednisone his pain resolved.  He does have a history of CKD.  Uric acid was obtained and was 6.9.  He mentions that after his course of prednisone, right foot pain and swelling completely resolved with no recurrence.  Patient has been decreasing.  And diet but when he does eat red meat or seafood, he does increase water intake.  And since then he has not had any recurrence of gout flare.  HTN blood pressure high today, denies any symptoms.  He notes that blood pressure at home ranges 135-140/83-85, only on lasix prn about 10 days in a month.  Creatinine better at 1.4    Family History:   Family History   Problem Relation Age of Onset    Cancer Mother     Heart disease Father     Cancer Brother     Leukemia Maternal Aunt     Heart disease Paternal Uncle     Skin cancer Maternal Grandfather        Social History:   Social History     Socioeconomic History    Marital status:    Tobacco Use    Smoking status: Former     Packs/day: 1.50     Years: 4.00     Additional pack years: 0.00     Total pack years: 6.00     Types: Cigarettes     Quit date: 1/10/2016     Years since quittin.8    Smokeless tobacco: Former     Types: Chew     Quit date: 3/8/2022   Vaping Use    Vaping Use: Former    Substances: Nicotine    Devices: Refillable tank   Substance and Sexual Activity    Alcohol use: Yes     Comment: SOCIAL     Drug use: No    Sexual activity: Defer     Partners: Female       Health Maintenance   Topic Date Due    COVID-19 Vaccine (3 2023-24 season) 11/15/2023 (Originally 2023)    INFLUENZA VACCINE  2024 (Originally 2023)    Pneumococcal Vaccine 0-64 (1 - PCV) 07/10/2024 (Originally 1971)    TDAP/TD  "VACCINES (1 - Tdap) 10/11/2024 (Originally 8/26/1984)    ZOSTER VACCINE (1 of 2) 05/26/2025 (Originally 8/26/2015)    ANNUAL PHYSICAL  07/10/2024    LIPID PANEL  07/14/2024    BMI FOLLOWUP  10/11/2024    COLORECTAL CANCER SCREENING  11/04/2032    HEPATITIS C SCREENING  Completed       Objective     Vital Signs  /96   Pulse 88   Resp 16   Ht 180.3 cm (70.98\")   Wt 99.8 kg (220 lb)   SpO2 97%   BMI 30.70 kg/m²   Estimated body mass index is 30.7 kg/m² as calculated from the following:    Height as of this encounter: 180.3 cm (70.98\").    Weight as of this encounter: 99.8 kg (220 lb).        Physical Exam  Vitals and nursing note reviewed.   Constitutional:       Appearance: Normal appearance.   HENT:      Head: Normocephalic and atraumatic.   Cardiovascular:      Rate and Rhythm: Normal rate and regular rhythm.      Pulses: Normal pulses.      Heart sounds: Normal heart sounds.   Pulmonary:      Effort: Pulmonary effort is normal. No respiratory distress.      Breath sounds: Normal breath sounds. No wheezing, rhonchi or rales.   Abdominal:      General: Abdomen is flat. Bowel sounds are normal.      Palpations: Abdomen is soft.      Tenderness: There is no abdominal tenderness. There is no guarding.   Musculoskeletal:         General: No swelling or deformity. Normal range of motion.      Cervical back: Neck supple.   Skin:     General: Skin is warm.      Capillary Refill: Capillary refill takes less than 2 seconds.   Neurological:      General: No focal deficit present.      Mental Status: He is alert. Mental status is at baseline.   Psychiatric:         Mood and Affect: Mood normal.         Behavior: Behavior normal.          Procedures     Assessment and Plan     Diagnoses and all orders for this visit:    1. Acute idiopathic gout of right ankle (Primary)  Assessment & Plan:  Was referred to Ortho for arthrocentesis, patient has not done this.  I did advise patient to follow with Ortho for " arthrocentesis.  Completely resolved with prednisone taper given patient's history of CKD.  We will continue to monitor uric acid level every 6 months.  Last was 6.9.  Goal is less than 7.  If with any frequent or recurrent gout flare, may need urate lowering agent such as febuxostat.  Will defer for now given that this is only his initial episode.           Counseling was given to patient for the following topics: instructions for management, risks and benefits of treatment options, and importance of treatment compliance.    Follow Up  Return in about 4 months (around 3/13/2024) for Recheck.    MD FIOR BenavidezE [facility]  [address]  [facility] [id number] [phone]

## 2024-01-18 ENCOUNTER — OFFICE VISIT (OUTPATIENT)
Dept: PULMONOLOGY | Facility: CLINIC | Age: 59
End: 2024-01-18
Payer: COMMERCIAL

## 2024-01-18 VITALS
DIASTOLIC BLOOD PRESSURE: 78 MMHG | HEIGHT: 71 IN | RESPIRATION RATE: 16 BRPM | BODY MASS INDEX: 31.78 KG/M2 | HEART RATE: 89 BPM | WEIGHT: 227 LBS | SYSTOLIC BLOOD PRESSURE: 126 MMHG | OXYGEN SATURATION: 97 %

## 2024-01-18 DIAGNOSIS — R93.89 ABNORMAL CT OF THE CHEST: Primary | ICD-10-CM

## 2024-01-18 DIAGNOSIS — J45.30 MILD PERSISTENT ASTHMA WITHOUT COMPLICATION: ICD-10-CM

## 2024-01-18 DIAGNOSIS — R91.8 GROUND GLASS OPACITY PRESENT ON IMAGING OF LUNG: ICD-10-CM

## 2024-01-18 PROCEDURE — 99214 OFFICE O/P EST MOD 30 MIN: CPT | Performed by: INTERNAL MEDICINE

## 2024-01-18 NOTE — PROGRESS NOTES
"  Chief Complaint   Patient presents with    Breathing Problem    Follow-up       Subjective   Tobias Newberry is a 58 y.o. male.   Patient was evaluated today for follow up of asthma, and abnormal CT.     Patient does not report any recent exacerbations requiring emergency room visits or hospitalizations.     Patient is compliant with pulmonary medicines, as prescribed.     he was on Qvar for 2 months and feels that it helped. he is using the rescue inhalers minimally.     Patient was evaluated today to discuss the results of CT scan.      The following portions of the patient's history were reviewed and updated as appropriate: allergies, current medications, past family history, past medical history, past social history, and past surgical history.    Review of Systems   HENT:  Negative for sinus pressure, sneezing and sore throat.    Respiratory:  Negative for cough, chest tightness, shortness of breath and wheezing.        Objective   Visit Vitals  /78   Pulse 89   Resp 16   Ht 180.3 cm (71\") Comment: pt reported   Wt 103 kg (227 lb)   SpO2 97%   BMI 31.66 kg/m²         BMI Readings from Last 3 Encounters:   01/18/24 31.66 kg/m²   11/13/23 30.70 kg/m²   10/11/23 30.70 kg/m²       Physical Exam  Vitals reviewed.   Constitutional:       Appearance: He is well-developed.   Neck:      Vascular: No JVD.   Cardiovascular:      Rate and Rhythm: Normal rate and regular rhythm.      Heart sounds: Normal heart sounds. No murmur heard.  Pulmonary:      Effort: Pulmonary effort is normal. No respiratory distress.      Breath sounds: Normal breath sounds. No wheezing or rales.   Musculoskeletal:      Cervical back: Neck supple.      Comments: Gait was normal.   Skin:     General: Skin is warm and dry.   Neurological:      Mental Status: He is alert and oriented to person, place, and time.         Assessment & Plan   Diagnoses and all orders for this visit:    1. Abnormal CT of the chest (Primary)  -     CT Chest " Without Contrast Diagnostic; Future    2. Ground glass opacity present on imaging of lung  -     CT Chest Without Contrast Diagnostic; Future    3. Mild persistent asthma without complication           Return in about 3 months (around 4/18/2024) for Recheck, Imaging, For Eveline Davis).    DISCUSSION (if any):  It appears that his symptoms of asthma are under adequate control with the current regimen.    Patient's medications for underlying asthma were reviewed in great detail.    Compliance with medications stressed.     Side effects of prescribed medications discussed with the patient.    The need to continue to be aware of triggers that may cause asthma exacerbation versus progression of disease, was also discussed.    I have discussed asthma action plan with him.    The patient was asked to call this office if the symptoms worsen.    Last CT scan results was reviewed in great detail with the patient.  Results for orders placed during the hospital encounter of 08/02/23    CT Chest Without Contrast Diagnostic    Narrative  CT SCAN OF THE CHEST WITHOUT CONTRAST 8/2/2023 5:29 PM    HISTORY:  f/u scans; C64.2-Malignant neoplasm of left kidney, except renal pelvis    PROCEDURE:  Axial CT images were obtained from the lung apex to the mid abdomen  without IV contrast. Coronal and sagittal reformatted images generated  from the axial data set and provided for interpretation. This study was  performed with techniques to keep radiation doses as low as reasonably  achievable, (ALARA). Individualized dose reduction techniques using  automated exposure control or adjustment of mA and/or kV according to  the patient size were employed. 768.8 mGy*cm.    COMPARISON:  CT chest 29 2023, 11/23/2022, 5/13/2022, 4/8/2022, and 1/20/2022.    FINDINGS:    CHEST:  Lack of IV contrast somewhat limits evaluation of the thoracic viscera.    Lungs/Pleura:  Central airways are patent. Redemonstration of bilateral multifocal  patchy areas  of groundglass opacities and scattered small nodular  opacities, some of them are improved in comparison to prior exam, some  of them are unchanged. No definite new lesions identified. No  pneumothorax or pleural effusion.    Heart, vessels and mediastinum:  The heart is normal in size. No pericardial effusion. The aorta and  pulmonary arteries are normal in caliber. No significant coronary artery  calcifications.    Lymph nodes:  No pathologically enlarged thoracic lymph nodes within the limits of a  noncontrast-enhanced examination.    Chest wall:  No acute findings.    Bones:  No acute fracture. No aggressive osseous sclerotic or lucent lesions  identified. Left adrenal gland is not identified.    Upper abdomen:  No acute findings in the upper abdomen. Prior left nephrectomy. Stable  2.0 cm hypodense lesion in the left hemiliver.    Impression  Stable to decreased bilateral multifocal patchy groundglass opacities  and small nodular opacities, likely representing treatment response. No  new suspicious findings. Continued follow-up is recommended.          Images personally reviewed, interpreted and dictated by VIVIANE Flowers..                This report was signed and finalized on 8/3/2023 8:36 AM by VIVIANE Flowers.      The CT scan shows that the lung abnormalities have remained stable. There was no acute change/process identified    Repeat CT will be scheduled for 6-7 months after the last CT scan.     Vaccination status addressed.    Declines influenza vaccinations.     Declines pneumonia vaccinations.     It was recommended that the patient consider Prevnar-20 vaccination and discuss it with his PCP, if not already obtained.       Dictated utilizing Dragon dictation.    This document was electronically signed by Lillian Neff MD on 01/18/24 at 16:00 EST

## 2024-01-31 ENCOUNTER — TELEPHONE (OUTPATIENT)
Dept: UROLOGY | Facility: CLINIC | Age: 59
End: 2024-01-31
Payer: COMMERCIAL

## 2024-01-31 NOTE — TELEPHONE ENCOUNTER
I called and left vm to reschedule appt due to imaging not complete. If patient calls back, please reschedule appt with Dr. Manjinder Obrien MD and give patient centralized scheduling phone number.

## 2024-02-01 ENCOUNTER — TELEPHONE (OUTPATIENT)
Dept: UROLOGY | Facility: CLINIC | Age: 59
End: 2024-02-01
Payer: COMMERCIAL

## 2024-02-01 NOTE — TELEPHONE ENCOUNTER
Returned patients call and let him know his CT is with contrast and re gave  him the scheduling number and to call us back if he had any other questions or concerns    Miladys,CMA

## 2024-03-01 ENCOUNTER — HOSPITAL ENCOUNTER (OUTPATIENT)
Dept: CT IMAGING | Facility: HOSPITAL | Age: 59
Discharge: HOME | End: 2024-03-01
Payer: COMMERCIAL

## 2024-03-01 DIAGNOSIS — C64.2 CARCINOMA OF LEFT KIDNEY: ICD-10-CM

## 2024-03-01 PROCEDURE — 25510000001 IOPAMIDOL 61 % SOLUTION: Performed by: UROLOGY

## 2024-03-01 PROCEDURE — 74177 CT ABD & PELVIS W/CONTRAST: CPT

## 2024-03-01 PROCEDURE — 71260 CT THORAX DX C+: CPT

## 2024-03-01 RX ADMIN — IOPAMIDOL 100 ML: 612 INJECTION, SOLUTION INTRAVENOUS at 13:38

## 2024-03-13 ENCOUNTER — OFFICE VISIT (OUTPATIENT)
Dept: INTERNAL MEDICINE | Facility: CLINIC | Age: 59
End: 2024-03-13
Payer: COMMERCIAL

## 2024-03-13 VITALS
SYSTOLIC BLOOD PRESSURE: 163 MMHG | HEART RATE: 79 BPM | TEMPERATURE: 98 F | BODY MASS INDEX: 35.81 KG/M2 | RESPIRATION RATE: 20 BRPM | DIASTOLIC BLOOD PRESSURE: 97 MMHG | WEIGHT: 255.8 LBS | OXYGEN SATURATION: 100 % | HEIGHT: 71 IN

## 2024-03-13 DIAGNOSIS — R73.9 HYPERGLYCEMIA: ICD-10-CM

## 2024-03-13 DIAGNOSIS — M10.071 ACUTE IDIOPATHIC GOUT OF RIGHT ANKLE: ICD-10-CM

## 2024-03-13 DIAGNOSIS — I10 PRIMARY HYPERTENSION: Primary | ICD-10-CM

## 2024-03-13 DIAGNOSIS — N18.30 STAGE 3 CHRONIC KIDNEY DISEASE, UNSPECIFIED WHETHER STAGE 3A OR 3B CKD: ICD-10-CM

## 2024-03-13 DIAGNOSIS — E78.2 MIXED HYPERLIPIDEMIA: ICD-10-CM

## 2024-03-13 DIAGNOSIS — Z12.5 PROSTATE CANCER SCREENING: ICD-10-CM

## 2024-03-13 DIAGNOSIS — K21.9 GASTROESOPHAGEAL REFLUX DISEASE WITHOUT ESOPHAGITIS: ICD-10-CM

## 2024-03-13 DIAGNOSIS — R60.0 BILATERAL LEG EDEMA: ICD-10-CM

## 2024-03-13 NOTE — PROGRESS NOTES
Office Note     Name: Tobias Newberry    : 1965     MRN: [id number]     Chief Complaint  Follow-up (4 month/Everything looks good and has no CC)    Subjective     History of Present Illness:  Tobias Newberry is a 58 y.o. male who presents today for chronic conditions.    Hypertension on amlodipine 5 mg daily, 130-145/80s at home, likely due to white coat HTN  GERD stable, diet controlled  Hyperlipidemia high LDL at 140s last time, not on any medications  CKD stable, follows with nephrology  Gout not on any medications  Bilateral leg edema on Lasix as needed  Prediabetes last A1c was 5.9    Colonoscopy  polyp, repeat in 3 years  Low-dose CT previous smoker, hx of right upper lobe nodule    Family History:   Family History   Problem Relation Age of Onset    Cancer Mother     Heart disease Father     Cancer Brother     Leukemia Maternal Aunt     Heart disease Paternal Uncle     Skin cancer Maternal Grandfather        Social History:   Social History     Socioeconomic History    Marital status:    Tobacco Use    Smoking status: Former     Current packs/day: 0.00     Average packs/day: 1.5 packs/day for 4.0 years (6.0 ttl pk-yrs)     Types: Cigarettes     Start date: 1/10/2012     Quit date: 1/10/2016     Years since quittin.1    Smokeless tobacco: Former     Types: Chew     Quit date: 3/8/2022   Vaping Use    Vaping status: Former    Substances: Nicotine    Devices: Refillable tank   Substance and Sexual Activity    Alcohol use: Yes     Comment: SOCIAL     Drug use: No    Sexual activity: Defer     Partners: Female       Health Maintenance   Topic Date Due    INFLUENZA VACCINE  2024 (Originally 2023)    Pneumococcal Vaccine 0-64 (1 of 2 - PCV) 07/10/2024 (Originally 1971)    TDAP/TD VACCINES (1 - Tdap) 10/11/2024 (Originally 1984)    COVID-19 Vaccine (3 - -24 season) 2025 (Originally 2023)    ZOSTER VACCINE (1 of 2) 2025 (Originally 2015)  "   ANNUAL PHYSICAL  07/10/2024    LIPID PANEL  07/14/2024    BMI FOLLOWUP  10/11/2024    COLORECTAL CANCER SCREENING  11/04/2032    HEPATITIS C SCREENING  Completed       Objective     Vital Signs  /97   Pulse 79   Temp 98 °F (36.7 °C)   Resp 20   Ht 180.3 cm (71\")   Wt 116 kg (255 lb 12.8 oz)   SpO2 100%   BMI 35.68 kg/m²   Estimated body mass index is 35.68 kg/m² as calculated from the following:    Height as of this encounter: 180.3 cm (71\").    Weight as of this encounter: 116 kg (255 lb 12.8 oz).        Physical Exam  Vitals and nursing note reviewed.   Constitutional:       Appearance: Normal appearance.   HENT:      Head: Normocephalic and atraumatic.   Cardiovascular:      Rate and Rhythm: Normal rate and regular rhythm.      Pulses: Normal pulses.      Heart sounds: Normal heart sounds.   Pulmonary:      Effort: Pulmonary effort is normal. No respiratory distress.      Breath sounds: Normal breath sounds. No wheezing, rhonchi or rales.   Abdominal:      General: Abdomen is flat. Bowel sounds are normal.      Palpations: Abdomen is soft.      Tenderness: There is no abdominal tenderness. There is no guarding.   Musculoskeletal:      Cervical back: Neck supple.   Skin:     General: Skin is warm.      Capillary Refill: Capillary refill takes less than 2 seconds.   Neurological:      General: No focal deficit present.      Mental Status: He is alert. Mental status is at baseline.   Psychiatric:         Mood and Affect: Mood normal.         Behavior: Behavior normal.          Procedures     Assessment and Plan     Diagnoses and all orders for this visit:    1. Primary hypertension (Primary)  Assessment & Plan:  Stable on current medication and dosage. Will continue current management. Refill medication as necessary.  Amlodipine  High today likely due to whitecoat hypertension normal at home    Orders:  -     CBC & Differential  -     Comprehensive Metabolic Panel  -     TSH Rfx On Abnormal To Free " T4    2. Mixed hyperlipidemia  Assessment & Plan:    I did advise patient that he will need to work on a strict low-fat and oil diet given that his ASCVD risk is greater than 10%.  I advised him that if his risk is still greater than 10% next visit, will start moderate dose statin.  He agreed and showed complete understanding.  The 10-year ASCVD risk score (Alan WAGNER, et al., 2019) is: 14.7%    Values used to calculate the score:      Age: 58 years      Sex: Male      Is Non- : No      Diabetic: No      Tobacco smoker: No      Systolic Blood Pressure: 163 mmHg      Is BP treated: Yes      HDL Cholesterol: 44 mg/dL      Total Cholesterol: 209 mg/dL      Orders:  -     Lipid Panel    3. Hyperglycemia  Assessment & Plan:  Not on any medications, will continue to monitor    Orders:  -     Hemoglobin A1c    4. Gastroesophageal reflux disease without esophagitis  Assessment & Plan:  Stable, diet controlled      5. Stage 3 chronic kidney disease, unspecified whether stage 3a or 3b CKD  Assessment & Plan:   stable, follows with nephrology  Have labs done prior to next visit      6. Acute idiopathic gout of right ankle  Assessment & Plan:  No recent acute attacks, doing well, will monitor uric acid prior to next visit    Orders:  -     Uric Acid    7. Bilateral leg edema  Assessment & Plan:  Stable, no complaints  On lasix      8. Prostate cancer screening  -     PSA Screen         Counseling was given to patient for the following topics: instructions for management, risks and benefits of treatment options, and importance of treatment compliance.    Follow Up  Return in about 6 months (around 9/13/2024) for Annual.    MD JAMAL Benavidez [facility]  [address] [place] [id number] [phone]

## 2024-03-13 NOTE — ASSESSMENT & PLAN NOTE
Stable on current medication and dosage. Will continue current management. Refill medication as necessary.  Amlodipine  High today likely due to whitecoat hypertension normal at home

## 2024-03-13 NOTE — ASSESSMENT & PLAN NOTE
I did advise patient that he will need to work on a strict low-fat and oil diet given that his ASCVD risk is greater than 10%.  I advised him that if his risk is still greater than 10% next visit, will start moderate dose statin.  He agreed and showed complete understanding.  The 10-year ASCVD risk score (Alan WAGNER, et al., 2019) is: 14.7%    Values used to calculate the score:      Age: 58 years      Sex: Male      Is Non- : No      Diabetic: No      Tobacco smoker: No      Systolic Blood Pressure: 163 mmHg      Is BP treated: Yes      HDL Cholesterol: 44 mg/dL      Total Cholesterol: 209 mg/dL

## 2024-03-18 ENCOUNTER — OFFICE VISIT (OUTPATIENT)
Dept: UROLOGY | Facility: CLINIC | Age: 59
End: 2024-03-18
Payer: COMMERCIAL

## 2024-03-18 VITALS
OXYGEN SATURATION: 98 % | BODY MASS INDEX: 31.08 KG/M2 | DIASTOLIC BLOOD PRESSURE: 90 MMHG | TEMPERATURE: 97.6 F | HEIGHT: 71 IN | SYSTOLIC BLOOD PRESSURE: 138 MMHG | WEIGHT: 222 LBS | HEART RATE: 78 BPM

## 2024-03-18 DIAGNOSIS — C64.2 RENAL CELL CARCINOMA OF LEFT KIDNEY: Primary | ICD-10-CM

## 2024-03-18 PROCEDURE — 99213 OFFICE O/P EST LOW 20 MIN: CPT | Performed by: UROLOGY

## 2024-03-18 NOTE — PROGRESS NOTES
"CC  Renal cell cancer    HPI  Mr. Newberry is a 58 y.o. male with history below in assessment, who presents for follow up.     Past Medical History:   Diagnosis Date    Cancer     renal cell carcinoma:  on immunotherapy    Hematuria     History of transfusion 02/16/2022    pt states he had transfusion during surgery to remove kidney    Hypertension     Tattoo     Tinnitus     Wears glasses        Past Surgical History:   Procedure Laterality Date    COLONOSCOPY N/A 11/4/2022    Procedure: COLONOSCOPY, BIOPSIES, POLYPECTOMY;  Surgeon: Rico Pizarro MD;  Location: [facility] ENDOSCOPY;  Service: Gastroenterology;  Laterality: N/A;    NEPHRECTOMY Left 2/16/2022    Procedure: OPEN LEFT RADICAL NEPHRECTOMY WITH LEFT ADRENALECTOMY;  Surgeon: Manjinder Obrien MD;  Location: [facility] OR;  Service: Urology;  Laterality: Left;    VASECTOMY           Current Outpatient Medications:     amLODIPine (NORVASC) 5 MG tablet, Take 1 tablet by mouth Daily., Disp: 30 tablet, Rfl: 1    furosemide (LASIX) 20 MG tablet, Take 1 tablet by mouth Daily., Disp: , Rfl:     albuterol sulfate HFA (Ventolin HFA) 108 (90 Base) MCG/ACT inhaler, Inhale 2 puffs Every 4 (Four) Hours As Needed for Wheezing or Shortness of Air. (Patient not taking: Reported on 11/13/2023), Disp: 18 g, Rfl: 5     Physical Exam  Visit Vitals  /90 (BP Location: Right arm, Patient Position: Sitting, Cuff Size: Adult)   Pulse 78   Temp 97.6 °F (36.4 °C) (Temporal)   Ht 180.3 cm (71\")   Wt 101 kg (222 lb)   SpO2 98%   BMI 30.96 kg/m²       Labs  Brief Urine Lab Results  (Last result in the past 365 days)        Color   Clarity   Blood   Leuk Est   Nitrite   Protein   CREAT   Urine HCG        05/22/23 1550 Yellow   Clear   Negative   Negative   Negative   Negative                   Lab Results   Component Value Date    GLUCOSE 116 (H) 10/11/2023    CALCIUM 9.9 10/11/2023     10/11/2023    K 4.3 10/11/2023    CO2 24.6 10/11/2023     10/11/2023    BUN 13 " 10/11/2023    CREATININE 1.42 (H) 10/11/2023    EGFRIFAFRI 50 (L) 02/24/2022    EGFRIFNONA 42 (L) 02/24/2022    BCR 9.2 10/11/2023    ANIONGAP 12.0 03/06/2023       Lab Results   Component Value Date    WBC 8.88 10/11/2023    HGB 14.7 10/11/2023    HCT 42.1 10/11/2023    MCV 86.3 10/11/2023     10/11/2023            Lab Results   Component Value Date    PSA 0.471 07/14/2023    PSA 0.5 06/17/2022    PSA 0.705 08/14/2018     Uric Acid   Date Value Ref Range Status   10/11/2023 6.9 3.4 - 7.0 mg/dL Final                  Radiographic Studies  CT Abdomen Pelvis With Contrast  Result Date: 3/1/2024  Stable left adrenal mass.   Fatty liver with stable cyst..   CTDI: 8.07 mGy DLP:1704.81 mGy.cm  This report was signed and finalized on 3/1/2024 4:06 PM by Zoë Fontaine MD.      CT Chest With Contrast Diagnostic  Result Date: 3/1/2024  Stable right upper lobe nodule.  Almost complete interval resolution of bilateral lower lobe groundglass opacities suggesting resolving infectious/inflammatory process.  CTDI: 8.07 mGy DLP:1704.81 mGy.cm  This report was signed and finalized on 3/1/2024 3:42 PM by Zoë Fontaine MD.          I have reviewed above labs and imaging.     Assessment  58 y.o. male with pT3a clear cell renal cell carcinoma, grade 2 (stage III), status post open radical nephrectomy and adrenalectomy 2/16/2022.  Margins all negative.  He received adjuvant pembrolizumab.  He is doing very well and CT scans show no change.  Renal function is stable. He is now outside the 2 year cinthya.     Plan  1. FU in 1 yr w/ CT C/A/P

## 2024-03-25 ENCOUNTER — OFFICE VISIT (OUTPATIENT)
Dept: GASTROENTEROLOGY | Facility: CLINIC | Age: 59
End: 2024-03-25
Payer: COMMERCIAL

## 2024-03-25 VITALS
DIASTOLIC BLOOD PRESSURE: 87 MMHG | HEIGHT: 71 IN | BODY MASS INDEX: 31.22 KG/M2 | WEIGHT: 223 LBS | HEART RATE: 90 BPM | SYSTOLIC BLOOD PRESSURE: 154 MMHG | TEMPERATURE: 98.4 F

## 2024-03-25 DIAGNOSIS — K76.0 NONALCOHOLIC FATTY LIVER DISEASE: Primary | ICD-10-CM

## 2024-03-25 DIAGNOSIS — K50.00 TERMINAL ILEITIS WITHOUT COMPLICATION: ICD-10-CM

## 2024-03-25 DIAGNOSIS — K21.9 GASTROESOPHAGEAL REFLUX DISEASE WITHOUT ESOPHAGITIS: ICD-10-CM

## 2024-03-25 PROCEDURE — 99213 OFFICE O/P EST LOW 20 MIN: CPT | Performed by: INTERNAL MEDICINE

## 2024-03-25 NOTE — PROGRESS NOTES
Follow Up Note     Date: 2024   Patient Name: Tobias Newberry  MRN: [id number]  : 1965     Referring Physician: Trupti Acevedo MD    Chief Complaint:    Chief Complaint   Patient presents with    Ulcer of Ileum    Heartburn       Interval History:   3/25/2024  Tobias Newberry is a 58 y.o. male who is here today for follow up for his a small bowel ulcerations.  He states that he did not have any further bleeding or black stools.  He was treated with steroids later on last year which helped on his immune related inflammation in the lungs.  Recently followed at urology and was been told everything is going well.  Nuys any current bowel symptoms.    2022  Tobias Newberry is a 57 y.o. male who is here today for follow up after his recent colonoscopy.  He states that he is doing well since the procedure. He had a recent colonoscopy on 2022 for colon cancer screening.  Colonoscopy revealed large ulcerated area in the terminal ileum.  He is here for follow-up.      He denies any abdominal pain has been having regular bowel movement.  No nausea vomiting.  He has occasional reflux symptoms and takes over-the-counter antacids as needed.  No history of anemia.  He is currently on immunotherapy following nephrectomy for renal cell carcinoma.  His mom had kidney cancer as well.  No prior EGD.  Family history of any colon cancer.  Stop smoking cigarette and now states that he stopped smoking vaping as well.  No family history of any IBD       Subjective      Past Medical History:   Past Medical History:   Diagnosis Date    Cancer     renal cell carcinoma:  on immunotherapy    Hematuria     History of transfusion 2022    pt states he had transfusion during surgery to remove kidney    Hypertension     Tattoo     Tinnitus     Wears glasses      Past Surgical History:   Past Surgical History:   Procedure Laterality Date    COLONOSCOPY N/A 2022    Procedure: COLONOSCOPY,  BIOPSIES, POLYPECTOMY;  Surgeon: Rico Pizarro MD;  Location: [facility] ENDOSCOPY;  Service: Gastroenterology;  Laterality: N/A;    NEPHRECTOMY Left 2022    Procedure: OPEN LEFT RADICAL NEPHRECTOMY WITH LEFT ADRENALECTOMY;  Surgeon: Manjinder Obrien MD;  Location: [facility] OR;  Service: Urology;  Laterality: Left;    VASECTOMY         Family History:   Family History   Problem Relation Age of Onset    Cancer Mother     Heart disease Father     Cancer Brother     Leukemia Maternal Aunt     Heart disease Paternal Uncle     Skin cancer Maternal Grandfather     Colon cancer Neg Hx        Social History:   Social History     Socioeconomic History    Marital status:    Tobacco Use    Smoking status: Former     Current packs/day: 0.00     Average packs/day: 1.5 packs/day for 4.0 years (6.0 ttl pk-yrs)     Types: Cigarettes     Start date: 1/10/2012     Quit date: 1/10/2016     Years since quittin.2    Smokeless tobacco: Former     Types: Chew     Quit date: 3/8/2022   Vaping Use    Vaping status: Former    Substances: Nicotine    Devices: [employer] tank   Substance and Sexual Activity    Alcohol use: Yes     Comment: SOCIAL     Drug use: No    Sexual activity: Defer     Partners: Female       Medications:     Current Outpatient Medications:     amLODIPine (NORVASC) 5 MG tablet, Take 1 tablet by mouth Daily., Disp: 30 tablet, Rfl: 1    furosemide (LASIX) 20 MG tablet, Take 1 tablet by mouth Daily., Disp: , Rfl:     Allergies:   No Known Allergies    Review of Systems:   Review of Systems   Constitutional:  Negative for appetite change, fatigue, fever and unexpected weight loss.   HENT:  Negative for trouble swallowing.    Gastrointestinal:  Positive for GERD. Negative for abdominal distention, abdominal pain, anal bleeding, blood in stool, constipation, diarrhea, nausea, rectal pain, vomiting and indigestion.       The following portions of the patient's history were reviewed and updated as  "appropriate: allergies, current medications, past family history, past medical history, past social history, past surgical history and problem list.    Objective     Physical Exam:  Vital Signs:   Vitals:    03/25/24 1611   BP: 154/87   Pulse: 90   Temp: 98.4 °F (36.9 °C)   TempSrc: Infrared   Weight: 101 kg (223 lb)  Comment: with clothing/shoes   Height: 180.3 cm (71\")  Comment: per patient       Physical Exam  Constitutional:       Appearance: He is obese.   HENT:      Head: Normocephalic and atraumatic.   Eyes:      Conjunctiva/sclera: Conjunctivae normal.   Abdominal:      General: Abdomen is flat. There is no distension.      Palpations: There is no mass.      Tenderness: There is no abdominal tenderness. There is no guarding or rebound.      Hernia: No hernia is present.   Musculoskeletal:      Cervical back: Normal range of motion and neck supple.   Neurological:      Mental Status: He is alert.         Results Review:   I reviewed the patient's new clinical results.    No visits with results within 90 Day(s) from this visit.   Latest known visit with results is:   Office Visit on 10/11/2023   Component Date Value Ref Range Status    WBC 10/11/2023 8.88  3.40 - 10.80 10*3/mm3 Final    RBC 10/11/2023 4.88  4.14 - 5.80 10*6/mm3 Final    Hemoglobin 10/11/2023 14.7  13.0 - 17.7 g/dL Final    Hematocrit 10/11/2023 42.1  37.5 - 51.0 % Final    MCV 10/11/2023 86.3  79.0 - 97.0 fL Final    MCH 10/11/2023 30.1  26.6 - 33.0 pg Final    MCHC 10/11/2023 34.9  31.5 - 35.7 g/dL Final    RDW 10/11/2023 13.3  12.3 - 15.4 % Final    Platelets 10/11/2023 280  140 - 450 10*3/mm3 Final    Glucose 10/11/2023 116 (H)  65 - 99 mg/dL Final    BUN 10/11/2023 13  6 - 20 mg/dL Final    Creatinine 10/11/2023 1.42 (H)  0.76 - 1.27 mg/dL Final    EGFR Result 10/11/2023 57.3 (L)  >60.0 mL/min/1.73 Final    Comment: GFR Normal >60  Chronic Kidney Disease <60  Kidney Failure <15      BUN/Creatinine Ratio 10/11/2023 9.2  7.0 - 25.0 Final    " Sodium 10/11/2023 137  136 - 145 mmol/L Final    Potassium 10/11/2023 4.3  3.5 - 5.2 mmol/L Final    Chloride 10/11/2023 101  98 - 107 mmol/L Final    Total CO2 10/11/2023 24.6  22.0 - 29.0 mmol/L Final    Calcium 10/11/2023 9.9  8.6 - 10.5 mg/dL Final    Total Protein 10/11/2023 7.0  6.0 - 8.5 g/dL Final    Albumin 10/11/2023 4.4  3.5 - 5.2 g/dL Final    Globulin 10/11/2023 2.6  gm/dL Final    A/G Ratio 10/11/2023 1.7  g/dL Final    Total Bilirubin 10/11/2023 0.6  0.0 - 1.2 mg/dL Final    Alkaline Phosphatase 10/11/2023 77  39 - 117 U/L Final    AST (SGOT) 10/11/2023 15  1 - 40 U/L Final    ALT (SGPT) 10/11/2023 14  1 - 41 U/L Final    Uric Acid 10/11/2023 6.9  3.4 - 7.0 mg/dL Final      CT Abdomen Pelvis With Contrast    Result Date: 3/1/2024  Stable left adrenal mass.   Fatty liver with stable cyst..   CTDI: 8.07 mGy DLP:1704.81 mGy.cm  This report was signed and finalized on 3/1/2024 4:06 PM by Zoë Fontaine MD.      CT Chest With Contrast Diagnostic    Result Date: 3/1/2024  Stable right upper lobe nodule.  Almost complete interval resolution of bilateral lower lobe groundglass opacities suggesting resolving infectious/inflammatory process.  CTDI: 8.07 mGy DLP:1704.81 mGy.cm  This report was signed and finalized on 3/1/2024 3:42 PM by Zoë Fontaine MD.         CT Abdomen Pelvis With Contrast     Result Date: 11/23/2022  1. Stable postsurgical changes in the left nephrectomy bed. 2. No evidence of progression or recurrence in the abdomen or pelvis. 3. Findings are concerning for atypical viral pneumonia or acute infectious/inflammatory process. This limits evaluation for metastatic disease in the chest. A short-term follow-up CT scan of the chest is recommended in one month after patient is treated. This finding was called to Dr. Pizano on 11/23/2022 at 3:04 PM.  This study was performed with techniques to keep radiation doses as low as reasonably achievable (ALARA). Individualized dose reduction techniques using  automated exposure control or adjustment of mA and/or kV according to the patient size were employed.     Images were reviewed, interpreted, and dictated by VIVIANE Salinas Transcribed by Iman Bedolla PA-C.  This report was signed and finalized on 11/23/2022 4:17 PM by VIVIANE Flowers.     Colonoscopy on 11/4/2022  - Preparation of the colon was fair.  - One 6 mm polyp in the proximal descending colon, removed with a cold snare. Resected and retrieved.  - One 2 mm polyp at the recto-sigmoid colon, removed with a cold snare. Resected and retrieved.  - Diverticulosis in the sigmoid colon and in the descending colon.  - Ileitis with ulcerations. This is likely chemo induced mucosal ulcerations. Biopsied.  - Four biopsies were obtained in the rectum, in the sigmoid colon, in the descending colon, in the transverse  colon, in the ascending colon and in the cecum.     Path; terminal ileum biopsy revealed ulcerated mucosa.  Cecum, ascending colon, transverse colon, descending colon, sigmoid, rectal biopsies were normal.  Descending colon polyp was tubular adenoma without any dysplasia.  Rectosigmoid polyp was hyperplastic    Assessment / Plan      1.  Medication induced terminal ileitis with ulcerations  2.  History of adenomatous colon polyps  3.  History of renal cell carcinoma status post nephrectomy and immunotherapy  3/25/2024  Patient is currently under remission.  Denies any episodes of rectal bleeding.  No change in bowel habit.  Recent CT abdomen pelvis done on 3/1/2024 revealed a previously identified left adrenal lesion likely cyst and was stable.  CBC done on 10/11/2022 revealed a normal hemoglobin of 14.7.  colonoscopy in 3 years,  due to suboptimal colon prep November 2025 11/30/2022  He had a colonoscopy done on 11/4/2022 which revealed terminal ileal inflammation with a large ulcerated mucosa.  Biopsies done revealed ulcerated mucosa no signs of any chronicity.  He had 2 polyps  removed the descending colon polyp was tubular adenoma without any dysplasia rectosigmoid polyp was hyperplastic.  Colon biopsies were normal.     I suspect his terminal ileal ulceration likely associated with his immunotherapy.  Endoscopic findings were not suggestive of any IBD.  Recent lab work done in September October reviewed which reveals normal hemoglobin.  He does have a elevated creatinine this could be again associated with immunotherapy     4.  Gastroesophageal reflux disease without esophagitis  Occasional intermittent reflux symptoms takes OTC antacids  To continue same    5.  Nonalcoholic fatty liver disease  FIB-4; 0.83; less likely cirrhosis  6.  Obesity with BMI  31.10    Patient's CT findings done in March 2024 patient has a fatty liver disease.  Drinks alcohol socially.  Last CMP done in October 2023 was normal.  His BMI is 31.    We had a discussion regarding lifestyle changes, dietary changes and importance of losing weight to prevent the progression of the liver disease  He needs yearly CMP CBC PT/INR  Follow-up in 1 year with labs      Follow Up:   No follow-ups on file.    Rico Pizarro MD  [facility] [place]  3/25/2024  16:13 EDT     Please note that portions of this note may have been completed with a voice recognition program.

## 2024-04-04 ENCOUNTER — TELEPHONE (OUTPATIENT)
Dept: PULMONOLOGY | Facility: CLINIC | Age: 59
End: 2024-04-04
Payer: COMMERCIAL

## 2024-04-04 NOTE — TELEPHONE ENCOUNTER
Patient had a CT chest done that was ordered by Dr. Obrien on 3/1/24. Sent Dr. Neff a message regarding our order to see if we need to cancel it.

## 2024-04-22 ENCOUNTER — OFFICE VISIT (OUTPATIENT)
Dept: PULMONOLOGY | Facility: CLINIC | Age: 59
End: 2024-04-22
Payer: COMMERCIAL

## 2024-04-22 VITALS
OXYGEN SATURATION: 98 % | HEIGHT: 71 IN | DIASTOLIC BLOOD PRESSURE: 82 MMHG | WEIGHT: 223 LBS | HEART RATE: 80 BPM | SYSTOLIC BLOOD PRESSURE: 158 MMHG | RESPIRATION RATE: 12 BRPM | BODY MASS INDEX: 31.22 KG/M2

## 2024-04-22 DIAGNOSIS — R91.1 LUNG NODULE: ICD-10-CM

## 2024-04-22 DIAGNOSIS — J45.30 MILD PERSISTENT ASTHMA WITHOUT COMPLICATION: ICD-10-CM

## 2024-04-22 DIAGNOSIS — R93.89 ABNORMAL CT OF THE CHEST: Primary | ICD-10-CM

## 2024-04-22 PROCEDURE — 99214 OFFICE O/P EST MOD 30 MIN: CPT | Performed by: NURSE PRACTITIONER

## 2024-04-22 NOTE — PROGRESS NOTES
"Chief Complaint   Patient presents with    Breathing Problem    Follow-up         Subjective   Tobias Newberry is a 58 y.o. male.   Patient is here today for follow up of asthma and shortness of breath.     Patient says that since the last office visit he has had no recent exacerbations. he denies any emergency room visits or hospitalizations, due to his asthma.     The patient says that he is compliant with pulmonary medicines, as prescribed.    No breathing issues.     He was treated for kidney cancer with Keytrmadan and f/w Dr. Obrien.       The following portions of the patient's history were reviewed and updated as appropriate: allergies, current medications, past family history, past medical history, past social history, and past surgical history.      Review of Systems   HENT:  Negative for sinus pressure, sneezing and sore throat.    Respiratory:  Negative for cough, chest tightness, shortness of breath and wheezing.        Objective   Visit Vitals  /82   Pulse 80   Resp 12   Ht 180.3 cm (71\") Comment: pt reported   Wt 101 kg (223 lb)   SpO2 98%   BMI 31.10 kg/m²       Physical Exam  Vitals reviewed.   HENT:      Head: Atraumatic.      Mouth/Throat:      Mouth: Mucous membranes are moist.   Eyes:      Extraocular Movements: Extraocular movements intact.   Cardiovascular:      Rate and Rhythm: Normal rate and regular rhythm.   Pulmonary:      Effort: Pulmonary effort is normal. No respiratory distress.   Musculoskeletal:      Comments: Gait normal.   Skin:     General: Skin is warm.   Neurological:      Mental Status: He is alert and oriented to person, place, and time.           Assessment & Plan   Diagnoses and all orders for this visit:    1. Abnormal CT of the chest (Primary)    2. Mild persistent asthma without complication    3. Lung nodule           Return if symptoms worsen or fail to improve.    DISCUSSION (if any):  His symptoms of asthma/SOB are under good control at this time without " medication.     Patient's medications for underlying asthma were reviewed with him in great detail.    Any needed adjustments to his pulmonary medications, either for clinical or insurance coverage reasons, have been made and are reflected in the orders.    Side effects of prescribed medications discussed with the patient.    Asthma action plan with discussed with him.    The patient was asked to call this office if the symptoms worsen.     I reviewed CT from March 2024 and discussed with the patient. Nodule stable and ground glass opacity nearly resolved.     Dr. Obrien has placed order for CT chest along with abdomen and pelvis in 1 year.     Study Result    Narrative & Impression   PROCEDURE: CT CHEST W CONTRAST DIAGNOSTIC-     HISTORY: [facility] Surveillance; C64.2-Malignant neoplasm of left kidney,  except renal pelvis     COMPARISON: August 2, 2023     PROCEDURE: The patient was injected with IV contrast.  Axial images were  obtained from the lung apex to the mid abdomen by computed tomography.  This study was performed with techniques to keep radiation doses as low  as reasonably achievable, (ALARA). Individualized dose reduction  techniques using automated exposure control or adjustment of mA and/or  kV according to the patient size were employed.     FINDINGS:     CHEST: There is no suspicious axillary adenopathy. There is no  suspicious hilar or mediastinal adenopathy.  The heart is proper size.  There is no pericardial or pleural effusion.  No suspicious infiltrate  or nodule identified. On the prior study there are multiple groundglass  opacities predominantly in the lower lobes which have almost completely  resolved. Right upper lobe nodule anteriorly is stable. No new nodules  identified. Limited images of the upper abdomen demonstrate diffuse  fatty infiltration of the liver. Postsurgical change from previous left  nephrectomy noted with several metallic surgical clips. No bony  destructive lesion seen.  Vascular calcifications noted.     IMPRESSION:  Stable right upper lobe nodule.     Almost complete interval resolution of bilateral lower lobe groundglass  opacities suggesting resolving infectious/inflammatory process.       I will have him f/u with us as needed since Dr. Obrien follows his CT chest.         Dictated utilizing Dragon dictation.    This document was electronically signed by JAMI Smith April 22, 2024  14:40 EDT

## 2024-09-07 LAB
ALBUMIN SERPL-MCNC: 4.2 G/DL (ref 3.5–5.2)
ALBUMIN/GLOB SERPL: 1.8 G/DL
ALP SERPL-CCNC: 66 U/L (ref 39–117)
ALT SERPL-CCNC: 16 U/L (ref 1–41)
AST SERPL-CCNC: 16 U/L (ref 1–40)
BASOPHILS # BLD AUTO: 0.03 10*3/MM3 (ref 0–0.2)
BASOPHILS NFR BLD AUTO: 0.5 % (ref 0–1.5)
BILIRUB SERPL-MCNC: 0.4 MG/DL (ref 0–1.2)
BUN SERPL-MCNC: 21 MG/DL (ref 6–20)
BUN/CREAT SERPL: 13.8 (ref 7–25)
CALCIUM SERPL-MCNC: 9.9 MG/DL (ref 8.6–10.5)
CHLORIDE SERPL-SCNC: 103 MMOL/L (ref 98–107)
CHOLEST SERPL-MCNC: 178 MG/DL (ref 0–200)
CO2 SERPL-SCNC: 26.1 MMOL/L (ref 22–29)
CREAT SERPL-MCNC: 1.52 MG/DL (ref 0.76–1.27)
EGFRCR SERPLBLD CKD-EPI 2021: 52.5 ML/MIN/1.73
EOSINOPHIL # BLD AUTO: 0.04 10*3/MM3 (ref 0–0.4)
EOSINOPHIL NFR BLD AUTO: 0.7 % (ref 0.3–6.2)
ERYTHROCYTE [DISTWIDTH] IN BLOOD BY AUTOMATED COUNT: 13.5 % (ref 12.3–15.4)
GLOBULIN SER CALC-MCNC: 2.4 GM/DL
GLUCOSE SERPL-MCNC: 120 MG/DL (ref 65–99)
HBA1C MFR BLD: 5.3 % (ref 4.8–5.6)
HCT VFR BLD AUTO: 43.4 % (ref 37.5–51)
HDLC SERPL-MCNC: 60 MG/DL (ref 40–60)
HGB BLD-MCNC: 15 G/DL (ref 13–17.7)
IMM GRANULOCYTES # BLD AUTO: 0.01 10*3/MM3 (ref 0–0.05)
IMM GRANULOCYTES NFR BLD AUTO: 0.2 % (ref 0–0.5)
LDLC SERPL CALC-MCNC: 104 MG/DL (ref 0–100)
LYMPHOCYTES # BLD AUTO: 1.46 10*3/MM3 (ref 0.7–3.1)
LYMPHOCYTES NFR BLD AUTO: 26.2 % (ref 19.6–45.3)
MCH RBC QN AUTO: 31.3 PG (ref 26.6–33)
MCHC RBC AUTO-ENTMCNC: 34.6 G/DL (ref 31.5–35.7)
MCV RBC AUTO: 90.4 FL (ref 79–97)
MONOCYTES # BLD AUTO: 0.5 10*3/MM3 (ref 0.1–0.9)
MONOCYTES NFR BLD AUTO: 9 % (ref 5–12)
NEUTROPHILS # BLD AUTO: 3.53 10*3/MM3 (ref 1.7–7)
NEUTROPHILS NFR BLD AUTO: 63.4 % (ref 42.7–76)
NRBC BLD AUTO-RTO: 0 /100 WBC (ref 0–0.2)
PLATELET # BLD AUTO: 248 10*3/MM3 (ref 140–450)
POTASSIUM SERPL-SCNC: 4.9 MMOL/L (ref 3.5–5.2)
PROT SERPL-MCNC: 6.6 G/DL (ref 6–8.5)
PSA SERPL-MCNC: 0.99 NG/ML (ref 0–4)
RBC # BLD AUTO: 4.8 10*6/MM3 (ref 4.14–5.8)
SODIUM SERPL-SCNC: 140 MMOL/L (ref 136–145)
TRIGL SERPL-MCNC: 77 MG/DL (ref 0–150)
TSH SERPL DL<=0.005 MIU/L-ACNC: 1.56 UIU/ML (ref 0.27–4.2)
URATE SERPL-MCNC: 7.1 MG/DL (ref 3.4–7)
VLDLC SERPL CALC-MCNC: 14 MG/DL (ref 5–40)
WBC # BLD AUTO: 5.57 10*3/MM3 (ref 3.4–10.8)

## 2024-09-16 ENCOUNTER — OFFICE VISIT (OUTPATIENT)
Dept: INTERNAL MEDICINE | Facility: CLINIC | Age: 59
End: 2024-09-16
Payer: COMMERCIAL

## 2024-09-16 VITALS
WEIGHT: 218 LBS | BODY MASS INDEX: 30.52 KG/M2 | OXYGEN SATURATION: 99 % | SYSTOLIC BLOOD PRESSURE: 150 MMHG | HEART RATE: 90 BPM | DIASTOLIC BLOOD PRESSURE: 89 MMHG | RESPIRATION RATE: 18 BRPM | TEMPERATURE: 97.7 F | HEIGHT: 71 IN

## 2024-09-16 DIAGNOSIS — E78.2 MIXED HYPERLIPIDEMIA: ICD-10-CM

## 2024-09-16 DIAGNOSIS — N18.30 STAGE 3 CHRONIC KIDNEY DISEASE, UNSPECIFIED WHETHER STAGE 3A OR 3B CKD: ICD-10-CM

## 2024-09-16 DIAGNOSIS — Z00.00 ANNUAL PHYSICAL EXAM: ICD-10-CM

## 2024-09-16 DIAGNOSIS — R60.0 BILATERAL LEG EDEMA: ICD-10-CM

## 2024-09-16 DIAGNOSIS — R06.2 WHEEZING: ICD-10-CM

## 2024-09-16 DIAGNOSIS — R73.9 HYPERGLYCEMIA: ICD-10-CM

## 2024-09-16 DIAGNOSIS — K21.9 GASTROESOPHAGEAL REFLUX DISEASE WITHOUT ESOPHAGITIS: ICD-10-CM

## 2024-09-16 DIAGNOSIS — M1A.0710 IDIOPATHIC CHRONIC GOUT OF RIGHT FOOT WITHOUT TOPHUS: ICD-10-CM

## 2024-09-16 DIAGNOSIS — I10 PRIMARY HYPERTENSION: Primary | ICD-10-CM

## 2024-09-16 PROBLEM — M10.9 ACUTE GOUT OF RIGHT ANKLE: Status: RESOLVED | Noted: 2023-10-11 | Resolved: 2024-09-16

## 2024-09-16 PROCEDURE — 99396 PREV VISIT EST AGE 40-64: CPT | Performed by: STUDENT IN AN ORGANIZED HEALTH CARE EDUCATION/TRAINING PROGRAM

## 2025-03-13 ENCOUNTER — TELEPHONE (OUTPATIENT)
Dept: UROLOGY | Facility: CLINIC | Age: 60
End: 2025-03-13

## 2025-03-13 NOTE — TELEPHONE ENCOUNTER
LVM TO GET HIS APPOINTMENT RESCHEDULED. PROVIDER WILL BE OUT OF THE OFFICE. I CANCELED HIS 3/27/2025 APPOINTMENT.

## 2025-03-27 ENCOUNTER — OFFICE VISIT (OUTPATIENT)
Dept: INTERNAL MEDICINE | Facility: CLINIC | Age: 60
End: 2025-03-27
Payer: COMMERCIAL

## 2025-03-27 VITALS
SYSTOLIC BLOOD PRESSURE: 166 MMHG | RESPIRATION RATE: 18 BRPM | BODY MASS INDEX: 31.22 KG/M2 | HEART RATE: 76 BPM | WEIGHT: 223 LBS | OXYGEN SATURATION: 100 % | TEMPERATURE: 98.8 F | HEIGHT: 71 IN | DIASTOLIC BLOOD PRESSURE: 98 MMHG

## 2025-03-27 DIAGNOSIS — E78.2 MIXED HYPERLIPIDEMIA: ICD-10-CM

## 2025-03-27 DIAGNOSIS — I10 PRIMARY HYPERTENSION: Primary | ICD-10-CM

## 2025-03-27 DIAGNOSIS — M1A.0710 IDIOPATHIC CHRONIC GOUT OF RIGHT FOOT WITHOUT TOPHUS: ICD-10-CM

## 2025-03-27 DIAGNOSIS — N18.30 STAGE 3 CHRONIC KIDNEY DISEASE, UNSPECIFIED WHETHER STAGE 3A OR 3B CKD: ICD-10-CM

## 2025-03-27 DIAGNOSIS — R73.9 HYPERGLYCEMIA: ICD-10-CM

## 2025-03-27 DIAGNOSIS — R60.0 BILATERAL LEG EDEMA: ICD-10-CM

## 2025-03-27 PROBLEM — R06.2 WHEEZING: Status: RESOLVED | Noted: 2024-09-16 | Resolved: 2025-03-27

## 2025-03-27 NOTE — ASSESSMENT & PLAN NOTE
Mild elevation of uric acid although asymptomatic and no recent flareups.  Will continue to monitor for now and hold off on uricosuric agent given patient's chronic kidney disease and lack of symptoms at this time.  Advised to decrease purine in diet for now.  Recheck lab

## 2025-03-27 NOTE — PROGRESS NOTES
Office Note     Name: Tobias Newberry    : 1965     MRN: [id number]     Chief Complaint  Hypertension (6 mo f/u)    Subjective     History of Present Illness:  Tobias Newberry is a 59 y.o. male who presents today for chronic conditions    Hypertension on amlodipine 5 mg daily, 130/80s at home, likely due to white coat HTN denies symptoms today  Hyperlipidemia last LDL was 104 not on any medications  GERD stable, diet controlled  CKD stable, follows with nephrology  Gout not on any medications, uric acid 7.1 but no recent flare up  Bilateral leg edema on Lasix as needed  Prediabetes last A1c was 5.3 not on any medications     Colonoscopy  polyp, repeat in 3 years  Low-dose CT previous smoker, hx of right upper lobe nodule, stable since 3/2024  PSA normal    Family History:   Family History   Problem Relation Age of Onset    Cancer Mother     Heart disease Father     Cancer Brother     Leukemia Maternal Aunt     Heart disease Paternal Uncle     Skin cancer Maternal Grandfather     Colon cancer Neg Hx        Social History:   Social History     Socioeconomic History    Marital status:    Tobacco Use    Smoking status: Former     Current packs/day: 0.00     Average packs/day: 1.5 packs/day for 4.0 years (6.0 ttl pk-yrs)     Types: Cigarettes     Start date: 1/10/2012     Quit date: 1/10/2016     Years since quittin.2     Passive exposure: Past    Smokeless tobacco: Former     Types: Chew     Quit date: 3/8/2022    Tobacco comments:     Used nicotine pouches   Vaping Use    Vaping status: Former    Substances: Nicotine    Devices: Refillable tank   Substance and Sexual Activity    Alcohol use: Yes     Comment: SOCIAL     Drug use: No    Sexual activity: Defer     Partners: Female       Health Maintenance   Topic Date Due    INFLUENZA VACCINE  2025 (Originally 2024)    ZOSTER VACCINE (1 of 2) 2025 (Originally 2015)    Pneumococcal Vaccine 50+ (1 of 2 - PCV)  "09/23/2025 (Originally 8/26/1984)    TDAP/TD VACCINES (1 - Tdap) 09/23/2025 (Originally 8/26/1984)    COVID-19 Vaccine (3 - 2024-25 season) 03/27/2026 (Originally 9/1/2024)    LIPID PANEL  09/06/2025    ANNUAL PHYSICAL  09/16/2025    COLORECTAL CANCER SCREENING  11/04/2032    HEPATITIS C SCREENING  Completed       Patient Care Team:  Trupti Acevedo MD as PCP - General (Family Medicine)  Garrett Pizano MD as Consulting Physician (Hematology and Oncology)  Manjinder Obrien MD as Consulting Physician (Urology)  Yolanda Shaffer APRN as Nurse Practitioner (Hematology and Oncology)    Objective     Vital Signs  /98   Pulse 76   Temp 98.8 °F (37.1 °C)   Resp 18   Ht 180.3 cm (71\")   Wt 101 kg (223 lb)   SpO2 100%   BMI 31.10 kg/m²   Estimated body mass index is 31.1 kg/m² as calculated from the following:    Height as of this encounter: 180.3 cm (71\").    Weight as of this encounter: 101 kg (223 lb).  BMI is >= 30 and <35. (Class 1 Obesity). The following options were offered after discussion;: exercise counseling/recommendations and nutrition counseling/recommendations    Physical Exam  Vitals and nursing note reviewed.   Constitutional:       Appearance: Normal appearance.   HENT:      Head: Normocephalic and atraumatic.   Cardiovascular:      Rate and Rhythm: Normal rate and regular rhythm.      Pulses: Normal pulses.      Heart sounds: Normal heart sounds.   Pulmonary:      Effort: Pulmonary effort is normal. No respiratory distress.      Breath sounds: Normal breath sounds. No wheezing, rhonchi or rales.   Abdominal:      General: Abdomen is flat. Bowel sounds are normal.      Palpations: Abdomen is soft.      Tenderness: There is no abdominal tenderness. There is no guarding.   Musculoskeletal:      Cervical back: Neck supple.   Skin:     General: Skin is warm.      Capillary Refill: Capillary refill takes less than 2 seconds.   Neurological:      General: No focal deficit present.      " Mental Status: He is alert. Mental status is at baseline.   Psychiatric:         Mood and Affect: Mood normal.         Behavior: Behavior normal.          Procedures     Assessment and Plan     Diagnoses and all orders for this visit:    1. Primary hypertension (Primary)  Assessment & Plan:  Doing well with whitecoat hypertension.  Stable on current medication and dosage. Will continue current management. Refill medication as necessary.  Amlodipine 5 mg daily have labs done      2. Mixed hyperlipidemia  Assessment & Plan:    have labs done we will continue to monitor not on any medications      3. Stage 3 chronic kidney disease, unspecified whether stage 3a or 3b CKD  Assessment & Plan:   stable, follows with nephrology  Have labs done       4. Idiopathic chronic gout of right foot without tophus  Assessment & Plan:  Mild elevation of uric acid although asymptomatic and no recent flareups.  Will continue to monitor for now and hold off on uricosuric agent given patient's chronic kidney disease and lack of symptoms at this time.  Advised to decrease purine in diet for now.  Recheck lab    Orders:  -     Uric Acid    5. Bilateral leg edema  Assessment & Plan:  Stable, no complaints  On lasix prn      6. Hyperglycemia  Assessment & Plan:  Obtain labs including A1c, advised lifestyle modifications           Counseling was given to patient for the following topics: instructions for management, risks and benefits of treatment options, and importance of treatment compliance.    Follow Up  Return in about 6 months (around 9/27/2025) for Annual.    MD JAMAL Benavidez [facility]  [address] [place] [address] [address]  Formerly named [facility] [id number] [phone]

## 2025-03-27 NOTE — ASSESSMENT & PLAN NOTE
Doing well with whitecoat hypertension.  Stable on current medication and dosage. Will continue current management. Refill medication as necessary.  Amlodipine 5 mg daily have labs done

## 2025-03-28 LAB
BASOPHILS # BLD AUTO: 0.03 10*3/MM3 (ref 0–0.2)
BASOPHILS NFR BLD AUTO: 0.5 % (ref 0–1.5)
BUN SERPL-MCNC: 19 MG/DL (ref 6–20)
BUN/CREAT SERPL: 11.8 (ref 7–25)
CALCIUM SERPL-MCNC: 9.4 MG/DL (ref 8.6–10.5)
CHLORIDE SERPL-SCNC: 104 MMOL/L (ref 98–107)
CHOLEST SERPL-MCNC: 166 MG/DL (ref 0–200)
CO2 SERPL-SCNC: 24.5 MMOL/L (ref 22–29)
CREAT SERPL-MCNC: 1.61 MG/DL (ref 0.76–1.27)
EGFRCR SERPLBLD CKD-EPI 2021: 49 ML/MIN/1.73
EOSINOPHIL # BLD AUTO: 0.09 10*3/MM3 (ref 0–0.4)
EOSINOPHIL NFR BLD AUTO: 1.4 % (ref 0.3–6.2)
ERYTHROCYTE [DISTWIDTH] IN BLOOD BY AUTOMATED COUNT: 13.2 % (ref 12.3–15.4)
GLUCOSE SERPL-MCNC: 111 MG/DL (ref 65–99)
HBA1C MFR BLD: 5.2 % (ref 4.8–5.6)
HCT VFR BLD AUTO: 41.1 % (ref 37.5–51)
HDLC SERPL-MCNC: 48 MG/DL (ref 40–60)
HGB BLD-MCNC: 14.2 G/DL (ref 13–17.7)
IMM GRANULOCYTES # BLD AUTO: 0.02 10*3/MM3 (ref 0–0.05)
IMM GRANULOCYTES NFR BLD AUTO: 0.3 % (ref 0–0.5)
LDLC SERPL CALC-MCNC: 104 MG/DL (ref 0–100)
LYMPHOCYTES # BLD AUTO: 1.64 10*3/MM3 (ref 0.7–3.1)
LYMPHOCYTES NFR BLD AUTO: 25 % (ref 19.6–45.3)
MCH RBC QN AUTO: 30.3 PG (ref 26.6–33)
MCHC RBC AUTO-ENTMCNC: 34.5 G/DL (ref 31.5–35.7)
MCV RBC AUTO: 87.8 FL (ref 79–97)
MONOCYTES # BLD AUTO: 0.68 10*3/MM3 (ref 0.1–0.9)
MONOCYTES NFR BLD AUTO: 10.4 % (ref 5–12)
NEUTROPHILS # BLD AUTO: 4.11 10*3/MM3 (ref 1.7–7)
NEUTROPHILS NFR BLD AUTO: 62.4 % (ref 42.7–76)
NRBC BLD AUTO-RTO: 0 /100 WBC (ref 0–0.2)
PLATELET # BLD AUTO: 254 10*3/MM3 (ref 140–450)
POTASSIUM SERPL-SCNC: 4.6 MMOL/L (ref 3.5–5.2)
RBC # BLD AUTO: 4.68 10*6/MM3 (ref 4.14–5.8)
SODIUM SERPL-SCNC: 139 MMOL/L (ref 136–145)
TRIGL SERPL-MCNC: 72 MG/DL (ref 0–150)
TSH SERPL DL<=0.005 MIU/L-ACNC: 1.38 UIU/ML (ref 0.27–4.2)
URATE SERPL-MCNC: 7.6 MG/DL (ref 3.4–7)
VLDLC SERPL CALC-MCNC: 14 MG/DL (ref 5–40)
WBC # BLD AUTO: 6.57 10*3/MM3 (ref 3.4–10.8)

## 2025-03-31 ENCOUNTER — RESULTS FOLLOW-UP (OUTPATIENT)
Dept: INTERNAL MEDICINE | Facility: CLINIC | Age: 60
End: 2025-03-31
Payer: COMMERCIAL

## 2025-03-31 DIAGNOSIS — E79.0 HYPERURICEMIA: Primary | ICD-10-CM

## 2025-03-31 RX ORDER — ALLOPURINOL 100 MG/1
100 TABLET ORAL DAILY
Qty: 90 TABLET | Refills: 1 | Status: SHIPPED | OUTPATIENT
Start: 2025-03-31

## 2025-03-31 NOTE — TELEPHONE ENCOUNTER
"Relay     \"Uric acid still elevated, start allopurinol daily. Recheck uric acid level in 2-3 months. Pls advise patient. A1c normal, cholesterol levels stable. Follow up as scheduled \"                 "

## (undated) DEVICE — SYS CLS SKIN PREMIERPRO EXOFINFUSION 22CM

## (undated) DEVICE — Device

## (undated) DEVICE — HYBRID TUBING/CAP SET FOR OLYMPUS® SCOPES: Brand: ERBE

## (undated) DEVICE — SPNG LAP 18X18IN LF STRL PK/5

## (undated) DEVICE — SKIN AFFIX SURG ADHESIVE 72/CS 0.55ML: Brand: MEDLINE

## (undated) DEVICE — ANTIBACTERIAL UNDYED BRAIDED (POLYGLACTIN 910), SYNTHETIC ABSORBABLE SUTURE: Brand: COATED VICRYL

## (undated) DEVICE — KT ORCA VLV SXN AIR/H2O W/SEAL 1P/U STRL

## (undated) DEVICE — ELECTRD BLD EZ CLN STD 6.5IN

## (undated) DEVICE — SNAR POLYP SENSATION STDOVL 27 240 BX40

## (undated) DEVICE — LUBE JELLY PK/2.75GM STRL BX/144

## (undated) DEVICE — FRCP BX RADIALJAW4 LG/CAP 2.4MM 240CM ORNG

## (undated) DEVICE — KITTNER SPONGE: Brand: DEROYAL

## (undated) DEVICE — SPONGE,LAP,SUPER ABSORBENT,18"X18",5/PK: Brand: MEDLINE

## (undated) DEVICE — MARYLAND JAW LAPAROSCOPIC SEALER/DIVIDER COATED: Brand: LIGASURE

## (undated) DEVICE — SUT PDS 0 CT 36IN VIO PDP358T

## (undated) DEVICE — 3M™ IOBAN™ 2 ANTIMICROBIAL INCISE DRAPE 6650EZ: Brand: IOBAN™ 2

## (undated) DEVICE — TOTAL TRAY, 16FR 10ML SIL FOLEY, URN: Brand: MEDLINE

## (undated) DEVICE — GLV SURG SENSICARE PI LF PF 7.5 GRN STRL

## (undated) DEVICE — RICH MAJOR PROCEDURE: Brand: MEDLINE INDUSTRIES, INC.

## (undated) DEVICE — SUT PDS LP 1 TP1 96IN VIO PDP880GA

## (undated) DEVICE — DEV OPN LIGASURE CRV 180D 36MM 13.5CM  1P/U

## (undated) DEVICE — TOWEL,OR,DSP,ST,BLUE,STD,4/PK,20PK/CS: Brand: MEDLINE

## (undated) DEVICE — SLV SCD CALF HEMOFORCE DVT THERP REPROC MD

## (undated) DEVICE — SUT SILK 2/0 SUTUPAK TIES 24IN SA75H

## (undated) DEVICE — ENDOSCOPY PORT CONNECTOR FOR OLYMPUS® SCOPES: Brand: ERBE

## (undated) DEVICE — VLV SXN AIR/H2O ORCAPOD3 1P/U STRL

## (undated) DEVICE — ECHELON FLEX 60 ARTICULATING ENDOSCOPIC LINEAR CUTTER (NO CARTRIDGE): Brand: ECHELON FLEX ENDOPATH

## (undated) DEVICE — GLV SURG SENSICARE W/ALOE PF LF 7 STRL